# Patient Record
Sex: FEMALE | Race: WHITE | NOT HISPANIC OR LATINO | Employment: OTHER | URBAN - METROPOLITAN AREA
[De-identification: names, ages, dates, MRNs, and addresses within clinical notes are randomized per-mention and may not be internally consistent; named-entity substitution may affect disease eponyms.]

---

## 2017-02-02 RX ORDER — SPIRONOLACTONE 25 MG/1
TABLET ORAL
Qty: 30 TAB | Refills: 2 | Status: SHIPPED | OUTPATIENT
Start: 2017-02-02 | End: 2017-07-12 | Stop reason: SDUPTHER

## 2017-03-09 DIAGNOSIS — I27.82 OTHER CHRONIC PULMONARY EMBOLISM: ICD-10-CM

## 2017-03-09 RX ORDER — RIVAROXABAN 20 MG/1
TABLET, FILM COATED ORAL
Qty: 30 TAB | Refills: 5 | Status: SHIPPED | OUTPATIENT
Start: 2017-03-09 | End: 2017-08-30 | Stop reason: SDUPTHER

## 2017-03-09 NOTE — TELEPHONE ENCOUNTER
Have we ever prescribed this med? Yes.  If yes, what date? 06/03/16    Last OV: 09/27/16-Jeremy    Next OV: 05/16/17-HOWARD ROT    DX: Chronic Pulmonary Embolism     Medications:   Requested Prescriptions     Pending Prescriptions Disp Refills   • XARELTO 20 MG Tab tablet [Pharmacy Med Name: XARELTO 20MG TABLETS] 30 Tab 5     Sig: TAKE 1 TABLET BY MOUTH EVERY DAY

## 2017-03-15 ENCOUNTER — HOSPITAL ENCOUNTER (OUTPATIENT)
Dept: LAB | Facility: MEDICAL CENTER | Age: 72
End: 2017-03-15
Attending: FAMILY MEDICINE
Payer: MEDICARE

## 2017-03-15 ENCOUNTER — HOSPITAL ENCOUNTER (OUTPATIENT)
Dept: LAB | Facility: MEDICAL CENTER | Age: 72
End: 2017-03-15
Attending: INTERNAL MEDICINE
Payer: MEDICARE

## 2017-03-15 DIAGNOSIS — E03.4 HYPOTHYROIDISM DUE TO ACQUIRED ATROPHY OF THYROID: ICD-10-CM

## 2017-03-15 DIAGNOSIS — E53.8 VITAMIN B12 DEFICIENCY: ICD-10-CM

## 2017-03-15 DIAGNOSIS — R73.9 ELEVATED BLOOD SUGAR: ICD-10-CM

## 2017-03-15 DIAGNOSIS — E79.0 ELEVATED URIC ACID IN BLOOD: ICD-10-CM

## 2017-03-15 LAB
25(OH)D3 SERPL-MCNC: 30 NG/ML (ref 30–100)
ALBUMIN SERPL BCP-MCNC: 4.3 G/DL (ref 3.2–4.9)
ALBUMIN SERPL BCP-MCNC: 4.5 G/DL (ref 3.2–4.9)
ALBUMIN/GLOB SERPL: 1.7 G/DL
ALP SERPL-CCNC: 86 U/L (ref 30–99)
ALT SERPL-CCNC: 14 U/L (ref 2–50)
ANION GAP SERPL CALC-SCNC: 8 MMOL/L (ref 0–11.9)
AST SERPL-CCNC: 17 U/L (ref 12–45)
BILIRUB SERPL-MCNC: 0.6 MG/DL (ref 0.1–1.5)
BUN SERPL-MCNC: 24 MG/DL (ref 8–22)
BUN SERPL-MCNC: 25 MG/DL (ref 8–22)
CALCIUM SERPL-MCNC: 9.5 MG/DL (ref 8.5–10.5)
CALCIUM SERPL-MCNC: 9.5 MG/DL (ref 8.5–10.5)
CHLORIDE SERPL-SCNC: 103 MMOL/L (ref 96–112)
CHLORIDE SERPL-SCNC: 105 MMOL/L (ref 96–112)
CO2 SERPL-SCNC: 26 MMOL/L (ref 20–33)
CO2 SERPL-SCNC: 26 MMOL/L (ref 20–33)
CREAT SERPL-MCNC: 0.92 MG/DL (ref 0.5–1.4)
CREAT SERPL-MCNC: 0.96 MG/DL (ref 0.5–1.4)
CREAT UR-MCNC: 142.5 MG/DL
EST. AVERAGE GLUCOSE BLD GHB EST-MCNC: 123 MG/DL
GLOBULIN SER CALC-MCNC: 2.6 G/DL (ref 1.9–3.5)
GLUCOSE SERPL-MCNC: 105 MG/DL (ref 65–99)
GLUCOSE SERPL-MCNC: 105 MG/DL (ref 65–99)
HBA1C MFR BLD: 5.9 % (ref 0–5.6)
PHOSPHATE SERPL-MCNC: 2.9 MG/DL (ref 2.5–4.5)
POTASSIUM SERPL-SCNC: 4.1 MMOL/L (ref 3.6–5.5)
POTASSIUM SERPL-SCNC: 4.1 MMOL/L (ref 3.6–5.5)
PROT 24H UR-MRATE: 8.8 MG/DL (ref 0–15)
PROT SERPL-MCNC: 7.1 G/DL (ref 6–8.2)
PROT/CREAT UR: 62 MG/G (ref 10–107)
SODIUM SERPL-SCNC: 137 MMOL/L (ref 135–145)
SODIUM SERPL-SCNC: 139 MMOL/L (ref 135–145)
T4 FREE SERPL-MCNC: 0.95 NG/DL (ref 0.53–1.43)
TSH SERPL DL<=0.005 MIU/L-ACNC: 1.39 UIU/ML (ref 0.3–3.7)
URATE SERPL-MCNC: 7.1 MG/DL (ref 1.9–8.2)
VIT B12 SERPL-MCNC: 544 PG/ML (ref 211–911)

## 2017-03-15 PROCEDURE — 84156 ASSAY OF PROTEIN URINE: CPT

## 2017-03-15 PROCEDURE — 80069 RENAL FUNCTION PANEL: CPT

## 2017-03-15 PROCEDURE — 82570 ASSAY OF URINE CREATININE: CPT

## 2017-03-15 PROCEDURE — 82306 VITAMIN D 25 HYDROXY: CPT | Mod: GA

## 2017-03-17 ENCOUNTER — TELEPHONE (OUTPATIENT)
Dept: MEDICAL GROUP | Facility: MEDICAL CENTER | Age: 72
End: 2017-03-17

## 2017-03-17 NOTE — TELEPHONE ENCOUNTER
----- Message from Brian Strauss M.D. sent at 3/17/2017 11:52 AM PDT -----  Hello,    Your labs look good without any significant abnormality.  We can discuss at your next visit.  I hope you are doing well.    Brian Strauss M.D.

## 2017-04-10 ENCOUNTER — APPOINTMENT (OUTPATIENT)
Dept: MEDICAL GROUP | Facility: MEDICAL CENTER | Age: 72
End: 2017-04-10
Payer: MEDICARE

## 2017-05-12 ENCOUNTER — TELEPHONE (OUTPATIENT)
Dept: MEDICAL GROUP | Facility: MEDICAL CENTER | Age: 72
End: 2017-05-12

## 2017-05-12 NOTE — TELEPHONE ENCOUNTER
Future Appointments       Provider Department Center    5/16/2017 10:40 AM Brian Strauss M.D. [facility]       ESTABLISHED PATIENT PRE-VISIT PLANNING     Note: Patient will not be contacted if there is no indication to call.     1.  Reviewed note from last office visit with PCP and/or other med group provider: Yes    2.  If any orders were placed at last visit, do we have Results/Consult Notes?        •  Labs - Labs ordered, completed and results are in chart.       •  Imaging - Imaging was not ordered at last office visit.       •  Referrals - No referrals were ordered at last office visit.    3.  Immunizations were updated in [employer] using WebIZ?: Yes       •  Web Iz Recommendations: HEPATITIS A  HEPATITIS B PREVNAR (PCV13)  TD VARICELLA (Chicken Pox)  ZOSTAVAX (Shingles)    4.  Patient is due for the following Health Maintenance Topics:   Health Maintenance Due   Topic Date Due   • Annual Wellness Visit  1945   • MAMMOGRAM  06/11/1983   • COLONOSCOPY  06/11/1993   • IMM ZOSTER VACCINE  06/11/2003   • BONE DENSITY  06/11/2008     5.  Patient was informed to arrive 15 min prior to their scheduled appointment and bring in their medication bottles. Pt. Aware of our location.

## 2017-05-15 ENCOUNTER — OFFICE VISIT (OUTPATIENT)
Dept: PULMONOLOGY | Facility: HOSPICE | Age: 72
End: 2017-05-15
Payer: MEDICARE

## 2017-05-15 VITALS
TEMPERATURE: 98 F | HEART RATE: 78 BPM | BODY MASS INDEX: 28.89 KG/M2 | RESPIRATION RATE: 15 BRPM | DIASTOLIC BLOOD PRESSURE: 80 MMHG | SYSTOLIC BLOOD PRESSURE: 142 MMHG | HEIGHT: 62 IN | WEIGHT: 157 LBS

## 2017-05-15 DIAGNOSIS — I82.431 ACUTE DEEP VEIN THROMBOSIS (DVT) OF POPLITEAL VEIN OF RIGHT LOWER EXTREMITY (HCC): ICD-10-CM

## 2017-05-15 DIAGNOSIS — I26.02 ACUTE SADDLE PULMONARY EMBOLISM WITH ACUTE COR PULMONALE (HCC): ICD-10-CM

## 2017-05-15 PROCEDURE — 1036F TOBACCO NON-USER: CPT | Performed by: INTERNAL MEDICINE

## 2017-05-15 PROCEDURE — 4040F PNEUMOC VAC/ADMIN/RCVD: CPT | Performed by: INTERNAL MEDICINE

## 2017-05-15 PROCEDURE — G8432 DEP SCR NOT DOC, RNG: HCPCS | Performed by: INTERNAL MEDICINE

## 2017-05-15 PROCEDURE — 1101F PT FALLS ASSESS-DOCD LE1/YR: CPT | Performed by: INTERNAL MEDICINE

## 2017-05-15 PROCEDURE — G8419 CALC BMI OUT NRM PARAM NOF/U: HCPCS | Performed by: INTERNAL MEDICINE

## 2017-05-15 PROCEDURE — 3017F COLORECTAL CA SCREEN DOC REV: CPT | Mod: 8P | Performed by: INTERNAL MEDICINE

## 2017-05-15 PROCEDURE — 99214 OFFICE O/P EST MOD 30 MIN: CPT | Performed by: INTERNAL MEDICINE

## 2017-05-15 PROCEDURE — 3014F SCREEN MAMMO DOC REV: CPT | Mod: 8P | Performed by: INTERNAL MEDICINE

## 2017-05-15 RX ORDER — ASCORBIC ACID
CRYSTALS ORAL DAILY
COMMUNITY
End: 2018-05-21

## 2017-05-15 NOTE — PROGRESS NOTES
CC: Follow-up of thromboembolic disease    HPI:  71-year-old woman was last seen by [facility] in October 2016. 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 She is maintained on long-term anticoagulation with Xarelto and uses this medication on a regular basis.    She had acute and chronic DVT in the right popliteal and posterior tibial veins in June 2013. At that time she also had massive pulmonary embolism including saddle embolus with involvement of all lobes. In April 2014 she had a right lower lobe PE.    Denies any problems with bleeding, back pain, itching, or lab test abnormalities according to her knowledge.    Has 2 issues. Concerned about her oxygen saturations. Had sats dropping in to the 80s with exercise. Discussed at length.    Has some occasional dizziness which waxes and wanes.  Actually describes vertigo - will refer to Dr. Strauss. Has recently had sinus issues with extreme nose-blowing which bothers her inner ears.      Patient Active Problem List    Diagnosis Date Noted   • Deep vein thrombosis (DVT) of popliteal vein of right lower extremity (CMS-HCC) 05/15/2017   • Lesion of throat 03/04/2016   • Neuropathy (CMS-HCC) 03/04/2016   • Sleep disorder 11/16/2015   • Hypothyroidism 07/09/2015   • Shortness of breath on exertion 07/09/2015   • Elevated blood sugar 07/09/2015   • Gout 05/13/2015   • CKD (chronic kidney disease) stage 3, GFR 30-59 ml/min 05/13/2015   • HTN (hypertension) 04/06/2015   • Hyperlipidemia 04/06/2015   • Pulmonary embolism (CMS-HCC) 04/23/2014   • Pulmonary embolism (CMS-HCC) 06/14/2013   • Near syncope 06/14/2013       Past Medical History   Diagnosis Date   • Hypertension    • Arthritis    • Hypoglycemia    • Anxiety    • Hyperlipidemia    • GERD (gastroesophageal reflux disease)    • PE (pulmonary embolism)    • DVT (deep venous thrombosis) (CMS-HCC)    • Hypothyroid    • Allergic rhinitis    • Back pain    •  Hypothyroidism    • Pneumonia    • Gout    • Hiatal hernia         Past Surgical History   Procedure Laterality Date   • Basal cell excision     • Pr enlarge breast with implant     • Hysterectomy, total abdominal     • Tonsillectomy         History reviewed. No pertinent family history.    Social History     Social History   • Marital Status: Single     Spouse Name: N/A   • Number of Children: N/A   • Years of Education: N/A     Occupational History   • Not on file.     Social History Main Topics   • Smoking status: Never Smoker    • Smokeless tobacco: Never Used   • Alcohol Use: No   • Drug Use: No   • Sexual Activity: Not on file     Other Topics Concern   • Not on file     Social History Narrative       Current Outpatient Prescriptions   Medication Sig Dispense Refill   • Cyanocobalamin (VITAMIN B 12) 250 MCG Lozenge Take  by mouth.     • XARELTO 20 MG Tab tablet TAKE 1 TABLET BY MOUTH EVERY DAY 30 Tab 5   • metoprolol (LOPRESSOR) 25 MG Tab TAKE ONE-HALF TABLET BY MOUTH EVERY DAY 30 Tab 2   • spironolactone (ALDACTONE) 25 MG Tab TAKE ONE-HALF TABLET BY MOUTH EVERY OTHER DAY AS NEEDED 30 Tab 2   • ondansetron (ZOFRAN ODT) 4 MG TABLET DISPERSIBLE DISSOLVE ONE TABLET BY MOUTH EVERY 8 HOURS AS NEEDED FOR NAUSEA AND VOMITING 12 Tab 3   • therapeutic multivitamin-minerals (THERAGRAN-M) Tab Take 1 Tab by mouth every day.     • levothyroxine (SYNTHROID) 50 MCG Tab Take 1 Tab by mouth Every morning on an empty stomach. 90 Tab 3   • Cholecalciferol (VITAMIN D) 2000 UNITS Cap Take 1,000 Units by mouth every day at 6 PM.     • lidocaine (XYLOCAINE) 5 % Ointment Apply 1 Application to affected area(s) as needed. 1 Tube 3   • alprazolam (XANAX) 0.25 MG Tab Take 1 Tab by mouth at bedtime as needed. Indications: Trouble Sleeping 30 Tab 0   • MELATONIN PO Take  by mouth.     • acetaminophen (TYLENOL) 325 MG TABS Take 650 mg by mouth every four hours as needed. Indications: Pain     • famotidine (PEPCID) 20 MG TABS Take 10 mg by  "mouth 2 times a day as needed. For GERD       No current facility-administered medications for this visit.    \"CURRENT RX\"    ALLERGIES: Ace inhibitors; Clindamycin; Lisinopril; Mobic; Nsaids; Prednisone; and Pseudoephedrine hcl    ROS  Per HPI, otherwise negative.    PHYSICAL EXAM    /80 mmHg  Pulse 78  Temp(Src) 36.7 °C (98 °F) (Oral)  Resp 15  Ht 1.575 m (5' 2\")  Wt 71.215 kg (157 lb)  BMI 28.71 kg/m2  Appearance: Well-nourished, well-developed, no acute distress  Eyes:  PERRLA, EOMI; glasses  Hearing:  Grossly intact  Nose:  Normal, no lesions or deformities, turbinates moist  Oropharynx:  Tongue normal, posterior pharynx without erythema or exudate  Mallampati classification:  2  Neck: Supple, trachea midline, no masses  Respiratory effort:  No intercostal retractions or use of accessory muscles  Lung auscultation:  No wheezes rhonchi rubs or rales  Cardiac auscultation:  No murmurs, rubs, or gallops, no regular rhythm, normal rate  Abdomen:  No tenderness, no organomegaly  Extremities:  No cyanosis, clubbing, edema  Gait and Station:  Normal  Digits and nails: No clubbing, cyanosis, petechiae, or nodes  Musculoskeletal:  Grossly normal  Skin:  No rashes  Orientation:  Oriented time, place, and person  Mood and affect:  No depression, anxiety, agitation  Judgment:  Intact    PROBLEMS:  History of DVT  History of massive saddle pulmonary embolism  Ongoing anticoagulation therapy was Xarelto  Chronic kidney disease  Hypertension hypothyroidism  Vertigo       PLAN:   She will continue Xarelto as prescribed, 20 mg a day. She will continue her other medications and follow up regularly with her primary care physician Dr. Strauss.  Will return to clinic in 12 months or sooner should the need arise.                      "

## 2017-05-15 NOTE — MR AVS SNAPSHOT
"        Sandi Hesterford   5/15/2017 1:00 PM   Office Visit   MRN: [id number]    Department:  [facility]   Dept Phone:  [phone]    Description:  Female : 1945   Provider:  Felix Nails M.D.           Reason for Visit     Follow-Up Thromboembolic Disease      Allergies as of 5/15/2017     Allergen Noted Reactions    Ace Inhibitors 2011       Clindamycin 08/15/2011       Lisinopril 07/10/2013   Vomiting    Mobic 2012       Nsaids 2015       Prednisone 2011       \"flu like symptoms\"    Pseudoephedrine Hcl 2008         You were diagnosed with     Acute saddle pulmonary embolism with acute cor pulmonale (CMS-HCC)   [8452761]       Acute deep vein thrombosis (DVT) of popliteal vein of right lower extremity (CMS-[facility])   [0626961]         Vital Signs     Blood Pressure Pulse Temperature Respirations Height Weight    142/80 mmHg 78 36.7 °C (98 °F) (Oral) 15 1.575 m (5' 2\") 71.215 kg (157 lb)    Body Mass Index Smoking Status                28.71 kg/m2 Never Smoker           Basic Information     Date Of Birth Sex Race Ethnicity Preferred Language    1945 Female White Non- English      Your appointments     May 16, 2017 10:40 AM   Established Patient with Brian Strauss M.D.   [facility] (--)    [address]  [address]  [facility] [address]   [phone]           You will be receiving a confirmation call a few days before your appointment from our automated call confirmation system.              Problem List              ICD-10-CM Priority Class Noted - Resolved    Pulmonary embolism (CMS-HCC) I26.99   2013 - Present    Near syncope R55   2013 - Present    Pulmonary embolism (CMS-HCC) I26.99   2014 - Present    HTN (hypertension) I10   2015 - Present    Hyperlipidemia E78.5   2015 - Present    Gout M10.9   2015 - Present    CKD (chronic kidney disease) stage 3, GFR 30-59 ml/min N18.3   2015 - " Present    Hypothyroidism E03.9   7/9/2015 - Present    Shortness of breath on exertion R06.02   7/9/2015 - Present    Elevated blood sugar R73.9   7/9/2015 - Present    Sleep disorder G47.9   11/16/2015 - Present    Lesion of throat J39.2   3/4/2016 - Present    Neuropathy (CMS-HCC) G62.9   3/4/2016 - Present    Deep vein thrombosis (DVT) of popliteal vein of right lower extremity (CMS-HCC) I82.431   5/15/2017 - Present      Health Maintenance        Date Due Completion Dates    MAMMOGRAM 6/11/1983 ---    COLONOSCOPY 6/11/1993 ---    IMM ZOSTER VACCINE 6/11/2003 ---    BONE DENSITY 6/11/2008 ---    IMM DTaP/Tdap/Td Vaccine (2 - Td) 5/14/2022 5/14/2012            Current Immunizations     13-VALENT PCV PREVNAR 11/12/2012    Influenza TIV (IM) 11/12/2012    Influenza Vaccine Adult HD 9/27/2016    Pneumococcal polysaccharide vaccine (PPSV-23) 10/6/2011    Tdap Vaccine 5/14/2012      Below and/or attached are the medications your provider expects you to take. Review all of your home medications and newly ordered medications with your provider and/or pharmacist. Follow medication instructions as directed by your provider and/or pharmacist. Please keep your medication list with you and share with your provider. Update the information when medications are discontinued, doses are changed, or new medications (including over-the-counter products) are added; and carry medication information at all times in the event of emergency situations     Allergies:  ACE INHIBITORS - (reactions not documented)     CLINDAMYCIN - (reactions not documented)     LISINOPRIL - Vomiting     MOBIC - (reactions not documented)     NSAIDS - (reactions not documented)     PREDNISONE - (reactions not documented)     PSEUDOEPHEDRINE HCL - (reactions not documented)               Medications  Valid as of: May 15, 2017 -  1:19 PM    Generic Name Brand Name Tablet Size Instructions for use    Acetaminophen (Tab) TYLENOL 325 MG Take 650 mg by mouth every  four hours as needed. Indications: Pain        ALPRAZolam (Tab) XANAX 0.25 MG Take 1 Tab by mouth at bedtime as needed. Indications: Trouble Sleeping        Cholecalciferol (Cap) Vitamin D 2000 UNITS Take 1,000 Units by mouth every day at 6 PM.        Cyanocobalamin (Lozenge) Vitamin B 12 250 MCG Take  by mouth.        Famotidine (Tab) PEPCID 20 MG Take 10 mg by mouth 2 times a day as needed. For GERD        Levothyroxine Sodium (Tab) SYNTHROID 50 MCG Take 1 Tab by mouth Every morning on an empty stomach.        Lidocaine (Ointment) XYLOCAINE 5 % Apply 1 Application to affected area(s) as needed.        Melatonin   Take  by mouth.        Metoprolol Tartrate (Tab) LOPRESSOR 25 MG TAKE ONE-HALF TABLET BY MOUTH EVERY DAY        Multiple Vitamins-Minerals (Tab) THERAGRAN-M  Take 1 Tab by mouth every day.        Ondansetron (TABLET DISPERSIBLE) ZOFRAN ODT 4 MG DISSOLVE ONE TABLET BY MOUTH EVERY 8 HOURS AS NEEDED FOR NAUSEA AND VOMITING        Rivaroxaban (Tab) XARELTO 20 MG TAKE 1 TABLET BY MOUTH EVERY DAY        Spironolactone (Tab) ALDACTONE 25 MG TAKE ONE-HALF TABLET BY MOUTH EVERY OTHER DAY AS NEEDED        .                 Medicines prescribed today were sent to:     [facility] [address] [place] [address] [address] AT [facility] [address] [facility]    [address] [facility] [address]    Phone: [phone] Fax: [phone]    Open 24 Hours?: No      Medication refill instructions:       If your prescription bottle indicates you have medication refills left, it is not necessary to call your provider’s office. Please contact your pharmacy and they will refill your medication.    If your prescription bottle indicates you do not have any refills left, you may request refills at any time through one of the following ways: The [email or web address] system (except Urgent Care), by calling your provider’s office, or by asking your pharmacy to contact your provider’s office with a refill request. Medication  refills are processed only during regular business hours and may not be available until the next business day. Your provider may request additional information or to have a follow-up visit with you prior to refilling your medication.   *Please Note: Medication refills are assigned a new Rx number when refilled electronically. Your pharmacy may indicate that no refills were authorized even though a new prescription for the same medication is available at the pharmacy. Please request the medicine by name with the pharmacy before contacting your provider for a refill.           Opsware Access Code: Activation code not generated  Current Opsware Status: Active

## 2017-05-16 ENCOUNTER — OFFICE VISIT (OUTPATIENT)
Dept: MEDICAL GROUP | Facility: MEDICAL CENTER | Age: 72
End: 2017-05-16
Payer: MEDICARE

## 2017-05-16 VITALS
HEIGHT: 62 IN | WEIGHT: 157 LBS | DIASTOLIC BLOOD PRESSURE: 92 MMHG | BODY MASS INDEX: 28.89 KG/M2 | SYSTOLIC BLOOD PRESSURE: 162 MMHG | RESPIRATION RATE: 18 BRPM | TEMPERATURE: 99.2 F | OXYGEN SATURATION: 94 % | HEART RATE: 82 BPM

## 2017-05-16 DIAGNOSIS — Z86.711 HISTORY OF PULMONARY EMBOLISM: ICD-10-CM

## 2017-05-16 DIAGNOSIS — E03.9 ACQUIRED HYPOTHYROIDISM: ICD-10-CM

## 2017-05-16 DIAGNOSIS — G62.9 NEUROPATHY: ICD-10-CM

## 2017-05-16 DIAGNOSIS — N18.30 CKD (CHRONIC KIDNEY DISEASE) STAGE 3, GFR 30-59 ML/MIN (HCC): ICD-10-CM

## 2017-05-16 PROCEDURE — G8432 DEP SCR NOT DOC, RNG: HCPCS | Performed by: FAMILY MEDICINE

## 2017-05-16 PROCEDURE — 1036F TOBACCO NON-USER: CPT | Performed by: FAMILY MEDICINE

## 2017-05-16 PROCEDURE — 3014F SCREEN MAMMO DOC REV: CPT | Mod: 8P | Performed by: FAMILY MEDICINE

## 2017-05-16 PROCEDURE — G8419 CALC BMI OUT NRM PARAM NOF/U: HCPCS | Performed by: FAMILY MEDICINE

## 2017-05-16 PROCEDURE — 1101F PT FALLS ASSESS-DOCD LE1/YR: CPT | Performed by: FAMILY MEDICINE

## 2017-05-16 PROCEDURE — 3017F COLORECTAL CA SCREEN DOC REV: CPT | Mod: 8P | Performed by: FAMILY MEDICINE

## 2017-05-16 PROCEDURE — 99214 OFFICE O/P EST MOD 30 MIN: CPT | Performed by: FAMILY MEDICINE

## 2017-05-16 PROCEDURE — 4040F PNEUMOC VAC/ADMIN/RCVD: CPT | Performed by: FAMILY MEDICINE

## 2017-05-16 RX ORDER — CLONAZEPAM 0.5 MG/1
0.25 TABLET ORAL
Qty: 30 TAB | Refills: 1 | Status: SHIPPED | OUTPATIENT
Start: 2017-05-16 | End: 2017-07-12 | Stop reason: SDUPTHER

## 2017-05-16 NOTE — MR AVS SNAPSHOT
"        Sandi Hesterford   2017 10:40 AM   Office Visit   MRN: [id number]    Department:  [facility]   Dept Phone:  [phone]    Description:  Female : 1945   Provider:  Brian Strauss M.D.           Reason for Visit     Dizziness n6edmls, dizzy when laying down flat at night and to sit up in morning    Other would like to discuss neuropathy      Allergies as of 2017     Allergen Noted Reactions    Ace Inhibitors 2011       Clindamycin 08/15/2011       Lisinopril 07/10/2013   Vomiting    Mobic 2012       Nsaids 2015       Prednisone 2011       \"flu like symptoms\"    Pseudoephedrine Hcl 2008         You were diagnosed with     Neuropathy (CMS-[facility])   [473334]       Acquired hypothyroidism   [5181353]         Vital Signs     Blood Pressure Pulse Temperature Respirations Height Weight    162/92 mmHg 82 37.3 °C (99.2 °F) 18 1.575 m (5' 2.01\") 71.215 kg (157 lb)    Body Mass Index Oxygen Saturation Smoking Status             28.71 kg/m2 94% Never Smoker          Basic Information     Date Of Birth Sex Race Ethnicity Preferred Language    1945 Female White Non- English      Your appointments     Oct 02, 2017  2:00 PM   Established Patient with Brian Strauss M.D.   [facility] (--)    [address]  [address]  [facility] [address]   [phone]           You will be receiving a confirmation call a few days before your appointment from our automated call confirmation system.              Problem List              ICD-10-CM Priority Class Noted - Resolved    Pulmonary embolism (CMS-HCC) I26.99   2013 - Present    Near syncope R55   2013 - Present    Pulmonary embolism (CMS-HCC) I26.99   2014 - Present    HTN (hypertension) I10   2015 - Present    Hyperlipidemia E78.5   2015 - Present    Gout M10.9   2015 - Present    CKD (chronic kidney disease) stage 3, GFR 30-59 ml/min N18.3   2015 - " Present    Hypothyroidism E03.9   7/9/2015 - Present    Shortness of breath on exertion R06.02   7/9/2015 - Present    Elevated blood sugar R73.9   7/9/2015 - Present    Sleep disorder G47.9   11/16/2015 - Present    Lesion of throat J39.2   3/4/2016 - Present    Neuropathy (CMS-HCC) G62.9   3/4/2016 - Present    Deep vein thrombosis (DVT) of popliteal vein of right lower extremity (CMS-HCC) I82.431   5/15/2017 - Present      Health Maintenance        Date Due Completion Dates    IMM ZOSTER VACCINE 6/11/2003 ---    BONE DENSITY 6/11/2008 ---    IMM DTaP/Tdap/Td Vaccine (2 - Td) 5/14/2022 5/14/2012            Current Immunizations     13-VALENT PCV PREVNAR 11/12/2012    Influenza TIV (IM) 11/12/2012    Influenza Vaccine Adult HD 9/27/2016    Pneumococcal polysaccharide vaccine (PPSV-23) 10/6/2011    Tdap Vaccine 5/14/2012      Below and/or attached are the medications your provider expects you to take. Review all of your home medications and newly ordered medications with your provider and/or pharmacist. Follow medication instructions as directed by your provider and/or pharmacist. Please keep your medication list with you and share with your provider. Update the information when medications are discontinued, doses are changed, or new medications (including over-the-counter products) are added; and carry medication information at all times in the event of emergency situations     Allergies:  ACE INHIBITORS - (reactions not documented)     CLINDAMYCIN - (reactions not documented)     LISINOPRIL - Vomiting     MOBIC - (reactions not documented)     NSAIDS - (reactions not documented)     PREDNISONE - (reactions not documented)     PSEUDOEPHEDRINE HCL - (reactions not documented)               Medications  Valid as of: May 16, 2017 - 11:11 AM    Generic Name Brand Name Tablet Size Instructions for use    Acetaminophen (Tab) TYLENOL 325 MG Take 650 mg by mouth every four hours as needed. Indications: Pain         Cholecalciferol (Cap) Vitamin D 2000 UNITS Take 1,000 Units by mouth every day at 6 PM.        ClonazePAM (Tab) KLONOPIN 0.5 MG Take 0.5 Tabs by mouth 1 time daily as needed.        Cyanocobalamin (Lozenge) Vitamin B 12 250 MCG Take  by mouth every day.        Famotidine (Tab) PEPCID 20 MG Take 10 mg by mouth 2 times a day as needed. For GERD        Levothyroxine Sodium (Tab) SYNTHROID 50 MCG Take 1 Tab by mouth Every morning on an empty stomach.        Lidocaine (Ointment) XYLOCAINE 5 % Apply 1 Application to affected area(s) as needed.        Melatonin   Take  by mouth.        Metoprolol Tartrate (Tab) LOPRESSOR 25 MG TAKE ONE-HALF TABLET BY MOUTH EVERY DAY        Multiple Vitamins-Minerals (Tab) THERAGRAN-M  Take 1 Tab by mouth every day.        Ondansetron (TABLET DISPERSIBLE) ZOFRAN ODT 4 MG DISSOLVE ONE TABLET BY MOUTH EVERY 8 HOURS AS NEEDED FOR NAUSEA AND VOMITING        Rivaroxaban (Tab) XARELTO 20 MG TAKE 1 TABLET BY MOUTH EVERY DAY        Spironolactone (Tab) ALDACTONE 25 MG TAKE ONE-HALF TABLET BY MOUTH EVERY OTHER DAY AS NEEDED        .                 Medicines prescribed today were sent to:     [facility] [address] [place] [address] [address] AT [facility] [address]    [address] [place] [address]    Phone: [phone] Fax: [phone]    Open 24 Hours?: No      Medication refill instructions:       If your prescription bottle indicates you have medication refills left, it is not necessary to call your provider’s office. Please contact your pharmacy and they will refill your medication.    If your prescription bottle indicates you do not have any refills left, you may request refills at any time through one of the following ways: The online [employer] system (except Urgent Care), by calling your provider’s office, or by asking your pharmacy to contact your provider’s office with a refill request. Medication refills are processed only during regular business hours and may  not be available until the next business day. Your provider may request additional information or to have a follow-up visit with you prior to refilling your medication.   *Please Note: Medication refills are assigned a new Rx number when refilled electronically. Your pharmacy may indicate that no refills were authorized even though a new prescription for the same medication is available at the pharmacy. Please request the medicine by name with the pharmacy before contacting your provider for a refill.        Your To Do List     Future Labs/Procedures Complete By Expires    CBC WITH DIFFERENTIAL  As directed 5/17/2018    COMP METABOLIC PANEL  As directed 5/17/2018    FREE THYROXINE  As directed 5/17/2018    TSH  As directed 5/17/2018    VITAMIN B12  As directed 5/17/2018         MyChart Access Code: Activation code not generated  Current Speek Status: Active

## 2017-05-19 PROBLEM — I82.431 DEEP VEIN THROMBOSIS (DVT) OF POPLITEAL VEIN OF RIGHT LOWER EXTREMITY (HCC): Status: RESOLVED | Noted: 2017-05-15 | Resolved: 2017-05-19

## 2017-05-19 NOTE — PROGRESS NOTES
Chief Complaint   Patient presents with   • Dizziness     [id number], dizzy when laying down flat at night and to sit up in morning   • Other     would like to discuss neuropathy     Multiple medical problems    HISTORY OF PRESENT ILLNESS: Patient is a 71 y.o. female established patient who presents today for the following.      1. Neuropathy (CMS-HCC)  This is a chronic and ongoing problem for the patient. Discussed the risks benefits and alternatives to these medications. Patient does not drive on these medications.  Discussed not drinking while taking this medication. Discussed not combining this medication with other sedating medications. Patient has multiple life stressors. Denies any depression or suicidal ideation.      2. Acquired hypothyroidism  Reviewed labs with the patient.  Taking medication as prescribed.  Patient denies any heat or cold intolerance. No abnormal diarrhea or constipation. No skin or hair changes.        3. History of pulmonary embolism  Doing well. Taking medications as prescribed. No chest pain palpitations or shortness of breath.  No bleeding events.        4. CKD (chronic kidney disease) stage 3, GFR 30-59 ml/min  Reviewed labs with the patient.  Discussed with the patient their chronic kidney disease.  Discussed need to avoid nephrotoxic drugs to include NSAIDs, certain antibiotics and contrast dye. Patient understands. Patient denies any urinary problems. No blood in the urine noted.          No problem-specific assessment & plan notes found for this encounter.      She  has a past medical history of Hypertension; Arthritis; Hypoglycemia; Anxiety; Hyperlipidemia; GERD (gastroesophageal reflux disease); PE (pulmonary embolism); DVT (deep venous thrombosis) (CMS-HCC); Hypothyroid; Allergic rhinitis; Back pain; Hypothyroidism; Pneumonia; Gout; and Hiatal hernia.    Patient Active Problem List    Diagnosis Date Noted   • Lesion of throat 03/04/2016   • Neuropathy (CMS-HCC) 03/04/2016   •  Sleep disorder 11/16/2015   • Hypothyroidism 07/09/2015   • Shortness of breath on exertion 07/09/2015   • Elevated blood sugar 07/09/2015   • Gout 05/13/2015   • CKD (chronic kidney disease) stage 3, GFR 30-59 ml/min 05/13/2015   • HTN (hypertension) 04/06/2015   • Hyperlipidemia 04/06/2015   • History of pulmonary embolism 04/23/2014       Allergies:Ace inhibitors; Clindamycin; Lisinopril; Mobic; Nsaids; Prednisone; and Pseudoephedrine hcl    Current Outpatient Prescriptions   Medication Sig Dispense Refill   • clonazepam (KLONOPIN) 0.5 MG Tab Take 0.5 Tabs by mouth 1 time daily as needed. 30 Tab 1   • Cyanocobalamin (VITAMIN B 12) 250 MCG Lozenge Take  by mouth every day.     • XARELTO 20 MG Tab tablet TAKE 1 TABLET BY MOUTH EVERY DAY 30 Tab 5   • metoprolol (LOPRESSOR) 25 MG Tab TAKE ONE-HALF TABLET BY MOUTH EVERY DAY 30 Tab 2   • spironolactone (ALDACTONE) 25 MG Tab TAKE ONE-HALF TABLET BY MOUTH EVERY OTHER DAY AS NEEDED 30 Tab 2   • therapeutic multivitamin-minerals (THERAGRAN-M) Tab Take 1 Tab by mouth every day.     • levothyroxine (SYNTHROID) 50 MCG Tab Take 1 Tab by mouth Every morning on an empty stomach. 90 Tab 3   • Cholecalciferol (VITAMIN D) 2000 UNITS Cap Take 1,000 Units by mouth every day at 6 PM.     • lidocaine (XYLOCAINE) 5 % Ointment Apply 1 Application to affected area(s) as needed. 1 Tube 3   • acetaminophen (TYLENOL) 325 MG TABS Take 650 mg by mouth every four hours as needed. Indications: Pain     • ondansetron (ZOFRAN ODT) 4 MG TABLET DISPERSIBLE DISSOLVE ONE TABLET BY MOUTH EVERY 8 HOURS AS NEEDED FOR NAUSEA AND VOMITING 12 Tab 3   • MELATONIN PO Take  by mouth.     • famotidine (PEPCID) 20 MG TABS Take 10 mg by mouth 2 times a day as needed. For GERD       No current facility-administered medications for this visit.       Social History   Substance Use Topics   • Smoking status: Never Smoker    • Smokeless tobacco: Never Used   • Alcohol Use: No       No family history on file.    Health  "Maintenance:      Review of Systems   No fever, chills, nausea, vomiting, diarrhea, chest pain or shortness of breath.  See HPI    Exam:  Blood pressure 162/92, pulse 82, temperature 37.3 °C (99.2 °F), resp. rate 18, height 1.575 m (5' 2.01\"), weight 71.215 kg (157 lb), SpO2 94 %. Body mass index is 28.71 kg/(m^2).  Constitutional:  NAD, well appearing.  HEENT:   NC/AT, PERRLA, EOMI, OP clear, no lymphadenopathy, no thyromegaly.    Cardiovascular: RRR.   No m/r/g. No carotid bruits.       Lungs:   CTAB, no w/r/r, no respiratory distress.  Abdomen: Soft, NT/ND + BS, no masses, no suprapubic tenderness, no hepatomegaly.  Extremities:  2+ DP and radial pulses bilaterally.  No c/c/e.  Skin:  Warm and dry.    Neurologic: Alert & oriented x 3, CN II-XII grossly intact, strength and sensation grossly intact.  No focal deficits noted.  Psychiatric:  Affect normal, mood normal, judgment normal.    Assessment/Plan:     1. Neuropathy (CMS-HCC)  This is a chronic and stable problem. Labs as indicated. Continue to monitor. Medications as applicable.    - CBC WITH DIFFERENTIAL; Future  - COMP METABOLIC PANEL; Future  - VITAMIN B12; Future    2. Acquired hypothyroidism  Stable. No adverse symptoms. Continue levothyroxine. Labs as indicated. Continue to monitor    - TSH; Future  - FREE THYROXINE; Future    3. History of pulmonary embolism  Stable. Taking medications as prescribed. No chest pain palpitations or shortness of breath.  No bleeding events.  Continue medications and anticoagulation.      4. CKD (chronic kidney disease) stage 3, GFR 30-59 ml/min  This is a chronic and stable problem. Labs reviewed with the patient. Discussed avoidance of nephrotoxic drugs. Continue hypertensive management. Continue to monitor.          Followup: No Follow-up on file.    Please note that this dictation was created using voice recognition software. I have made every reasonable attempt to correct obvious errors, but I expect that there are " errors of grammar and possibly content that I did not discover before finalizing the note.

## 2017-06-22 ENCOUNTER — OFFICE VISIT (OUTPATIENT)
Dept: MEDICAL GROUP | Facility: PHYSICIAN GROUP | Age: 72
End: 2017-06-22
Payer: MEDICARE

## 2017-06-22 ENCOUNTER — HOSPITAL ENCOUNTER (OUTPATIENT)
Dept: LAB | Facility: MEDICAL CENTER | Age: 72
End: 2017-06-22
Attending: PHYSICIAN ASSISTANT
Payer: MEDICARE

## 2017-06-22 VITALS
DIASTOLIC BLOOD PRESSURE: 90 MMHG | HEART RATE: 91 BPM | HEIGHT: 62 IN | TEMPERATURE: 98.4 F | SYSTOLIC BLOOD PRESSURE: 150 MMHG | WEIGHT: 158.51 LBS | OXYGEN SATURATION: 97 % | BODY MASS INDEX: 29.17 KG/M2

## 2017-06-22 DIAGNOSIS — R10.32 LEFT LOWER QUADRANT PAIN: ICD-10-CM

## 2017-06-22 PROBLEM — R10.9 STOMACH PAIN: Status: RESOLVED | Noted: 2017-06-22 | Resolved: 2017-06-22

## 2017-06-22 PROBLEM — R10.9 STOMACH PAIN: Status: ACTIVE | Noted: 2017-06-22

## 2017-06-22 LAB
ALBUMIN SERPL BCP-MCNC: 4.3 G/DL (ref 3.2–4.9)
ALBUMIN/GLOB SERPL: 1.6 G/DL
ALP SERPL-CCNC: 81 U/L (ref 30–99)
ALT SERPL-CCNC: 15 U/L (ref 2–50)
ANION GAP SERPL CALC-SCNC: 10 MMOL/L (ref 0–11.9)
AST SERPL-CCNC: 17 U/L (ref 12–45)
BILIRUB SERPL-MCNC: 0.3 MG/DL (ref 0.1–1.5)
BUN SERPL-MCNC: 26 MG/DL (ref 8–22)
CALCIUM SERPL-MCNC: 9.2 MG/DL (ref 8.5–10.5)
CHLORIDE SERPL-SCNC: 105 MMOL/L (ref 96–112)
CO2 SERPL-SCNC: 24 MMOL/L (ref 20–33)
CREAT SERPL-MCNC: 0.87 MG/DL (ref 0.5–1.4)
ERYTHROCYTE [DISTWIDTH] IN BLOOD BY AUTOMATED COUNT: 46.2 FL (ref 35.9–50)
GFR SERPL CREATININE-BSD FRML MDRD: >60 ML/MIN/1.73 M 2
GLOBULIN SER CALC-MCNC: 2.7 G/DL (ref 1.9–3.5)
GLUCOSE SERPL-MCNC: 105 MG/DL (ref 65–99)
HCT VFR BLD AUTO: 45.4 % (ref 37–47)
HGB BLD-MCNC: 14.7 G/DL (ref 12–16)
MCH RBC QN AUTO: 29.2 PG (ref 27–33)
MCHC RBC AUTO-ENTMCNC: 32.4 G/DL (ref 33.6–35)
MCV RBC AUTO: 90.3 FL (ref 81.4–97.8)
PLATELET # BLD AUTO: 223 K/UL (ref 164–446)
PMV BLD AUTO: 11.2 FL (ref 9–12.9)
POTASSIUM SERPL-SCNC: 4 MMOL/L (ref 3.6–5.5)
PROT SERPL-MCNC: 7 G/DL (ref 6–8.2)
RBC # BLD AUTO: 5.03 M/UL (ref 4.2–5.4)
SODIUM SERPL-SCNC: 139 MMOL/L (ref 135–145)
WBC # BLD AUTO: 10.2 K/UL (ref 4.8–10.8)

## 2017-06-22 PROCEDURE — 99214 OFFICE O/P EST MOD 30 MIN: CPT | Performed by: PHYSICIAN ASSISTANT

## 2017-06-22 PROCEDURE — 85027 COMPLETE CBC AUTOMATED: CPT

## 2017-06-22 PROCEDURE — 80053 COMPREHEN METABOLIC PANEL: CPT

## 2017-06-22 PROCEDURE — 36415 COLL VENOUS BLD VENIPUNCTURE: CPT

## 2017-06-22 NOTE — MR AVS SNAPSHOT
"        Sandi Hesterford   2017 3:00 PM   Office Visit   MRN: [id number]    Department:  [facility]   Dept Phone:  [phone]    Description:  Female : 1945   Provider:  Xochitl Estrella PA-C           Reason for Visit     Other stomach concerns (uncomfort and soreness)      Allergies as of 2017     Allergen Noted Reactions    Ace Inhibitors 2011       Clindamycin 08/15/2011       Lisinopril 07/10/2013   Vomiting    Mobic 2012       Nsaids 2015       Prednisone 2011       \"flu like symptoms\"    Pseudoephedrine Hcl 2008         You were diagnosed with     Left lower quadrant pain   [[id number]]         Vital Signs     Blood Pressure Pulse Temperature Height Weight Body Mass Index    150/90 mmHg 91 36.9 °C (98.4 °F) 1.575 m (5' 2.01\") 71.9 kg (158 lb 8.2 oz) 28.98 kg/m2    Oxygen Saturation Smoking Status                97% Never Smoker           Basic Information     Date Of Birth Sex Race Ethnicity Preferred Language    1945 Female White Non- English      Your appointments     2017  8:30 AM   CT BODY WITH with VISTA CT 1   [facility] ([place])    [address] [facility] [address] [phone]           Some exams require specific prep instructions that would have been given to you at time of scheduling. If you have any additional questions about the prep instructions, please call Imaging Scheduling at [phone] and press #2.            Aug 11, 2017 10:00 AM   Established Patient with Brian Strauss M.D.   [facility] (--)    [address]  [address]  [facility] [place] [facility] [id number] [phone]           You will be receiving a confirmation call a few days before your appointment from our automated call confirmation system.            Oct 02, 2017  2:00 PM   Established Patient with Brian Strauss M.D.   [facility] (--)    [address]  [address]  [place] [place] [facility] [id number] [phone]           You " will be receiving a confirmation call a few days before your appointment from our automated call confirmation system.              Problem List              ICD-10-CM Priority Class Noted - Resolved    History of pulmonary embolism Z86.711   4/23/2014 - Present    HTN (hypertension) I10   4/6/2015 - Present    Hyperlipidemia E78.5   4/6/2015 - Present    Gout M10.9   5/13/2015 - Present    CKD (chronic kidney disease) stage 3, GFR 30-59 ml/min N18.3   5/13/2015 - Present    Hypothyroidism E03.9   7/9/2015 - Present    Shortness of breath on exertion R06.02   7/9/2015 - Present    Elevated blood sugar R73.9   7/9/2015 - Present    Sleep disorder G47.9   11/16/2015 - Present    Lesion of throat J39.2   3/4/2016 - Present    Neuropathy (CMS-HCC) G62.9   3/4/2016 - Present    Left lower quadrant pain R10.32   6/22/2017 - Present      Health Maintenance        Date Due Completion Dates    IMM ZOSTER VACCINE 6/11/2003 ---    BONE DENSITY 6/11/2008 ---    IMM DTaP/Tdap/Td Vaccine (2 - Td) 5/14/2022 5/14/2012            Current Immunizations     13-VALENT PCV PREVNAR 11/12/2012    Influenza TIV (IM) 11/12/2012    Influenza Vaccine Adult HD 9/27/2016    Pneumococcal polysaccharide vaccine (PPSV-23) 10/6/2011    Tdap Vaccine 5/14/2012      Below and/or attached are the medications your provider expects you to take. Review all of your home medications and newly ordered medications with your provider and/or pharmacist. Follow medication instructions as directed by your provider and/or pharmacist. Please keep your medication list with you and share with your provider. Update the information when medications are discontinued, doses are changed, or new medications (including over-the-counter products) are added; and carry medication information at all times in the event of emergency situations     Allergies:  ACE INHIBITORS - (reactions not documented)     CLINDAMYCIN - (reactions not documented)     LISINOPRIL - Vomiting      MOBIC - (reactions not documented)     NSAIDS - (reactions not documented)     PREDNISONE - (reactions not documented)     PSEUDOEPHEDRINE HCL - (reactions not documented)               Medications  Valid as of: June 22, 2017 -  3:54 PM    Generic Name Brand Name Tablet Size Instructions for use    Acetaminophen (Tab) TYLENOL 325 MG Take 650 mg by mouth every four hours as needed. Indications: Pain        Cholecalciferol (Cap) Vitamin D 2000 UNITS Take 1,000 Units by mouth every day at 6 PM.        ClonazePAM (Tab) KLONOPIN 0.5 MG Take 0.5 Tabs by mouth 1 time daily as needed.        Cyanocobalamin (Lozenge) Vitamin B 12 250 MCG Take  by mouth every day.        Famotidine (Tab) PEPCID 20 MG Take 10 mg by mouth 2 times a day as needed. For GERD        Levothyroxine Sodium (Tab) SYNTHROID 50 MCG Take 1 Tab by mouth Every morning on an empty stomach.        Lidocaine (Ointment) XYLOCAINE 5 % Apply 1 Application to affected area(s) as needed.        Melatonin   Take  by mouth.        Metoprolol Tartrate (Tab) LOPRESSOR 25 MG TAKE ONE-HALF TABLET BY MOUTH EVERY DAY        Multiple Vitamins-Minerals (Tab) THERAGRAN-M  Take 1 Tab by mouth every day.        Ondansetron (TABLET DISPERSIBLE) ZOFRAN ODT 4 MG DISSOLVE ONE TABLET BY MOUTH EVERY 8 HOURS AS NEEDED FOR NAUSEA AND VOMITING        Rivaroxaban (Tab) XARELTO 20 MG TAKE 1 TABLET BY MOUTH EVERY DAY        Spironolactone (Tab) ALDACTONE 25 MG TAKE ONE-HALF TABLET BY MOUTH EVERY OTHER DAY AS NEEDED        .                 Medicines prescribed today were sent to:     [id number] - [place] - [address] AT [facility] [address]    [address] [place] [address]    Phone: [phone] Fax: [phone]    Open 24 Hours?: No      Medication refill instructions:       If your prescription bottle indicates you have medication refills left, it is not necessary to call your provider’s office. Please contact your pharmacy and they will  refill your medication.    If your prescription bottle indicates you do not have any refills left, you may request refills at any time through one of the following ways: The online [facility] system (except Urgent Care), by calling your provider’s office, or by asking your pharmacy to contact your provider’s office with a refill request. Medication refills are processed only during regular business hours and may not be available until the next business day. Your provider may request additional information or to have a follow-up visit with you prior to refilling your medication.   *Please Note: Medication refills are assigned a new Rx number when refilled electronically. Your pharmacy may indicate that no refills were authorized even though a new prescription for the same medication is available at the pharmacy. Please request the medicine by name with the pharmacy before contacting your provider for a refill.        Your To Do List     Future Labs/Procedures Complete By Expires    CBC WITHOUT DIFFERENTIAL  As directed 12/23/2017    COMP METABOLIC PANEL  As directed 6/22/2018    CT-ABDOMEN-PELVIS WITH & W/O  As directed 12/23/2017         [facility] Access Code: Activation code not generated  Current Safe [facility] Clear Status: Active

## 2017-06-22 NOTE — ASSESSMENT & PLAN NOTE
"Patient states 1 week ago she started cramping on the left side of her abdomen. States the cramping was from midline of lower abdominal quadrant to left lower outer quadrant. States she felt  \"bubble bouncing when walking\" located left of bladder region. States the next day she experienced abdominal burning and it is an intermittent burning pain that keeps reoccurring. States the \"bubble/lump sensation is intermittent, reoccurring sensation. States she can not feel the \"bubble/lump\" with hands. States it reminds her of pregnancy flutters. States it sore to lay on left side. States she took Ex-Lax 5 days before developing the above symptoms. States she took the Ex-Lax because she was feeling a kink on left side.  States she is unable to recall how many bowel movements   States 3 days after developing the above symptoms she has taken a cup full of Raina Lax in the am every day until today. Raina-Lax from Saturday June 17, 2017 - June 20, 2017. States she was going for 2-3 hours and stool is soft stools. States it is not diarrhea.    States she usually takes Raina Lax every 3 weeks for 3 days but states it makes her regular. States she was only going once a day.   Urology appointment in two weeks. States she has urethral caruncle   States the side ache is causing her to not being able to sleep on her side.   Denies melana, hematochezia, nausea, vomiting, fever, chills, headache, denies mucus in stools. Denies ever having a colonoscopy.   "

## 2017-06-22 NOTE — ASSESSMENT & PLAN NOTE
"Patient states 1 week ago she started experiencing cramping on the left side of her abdomen. States the cramping was from midline of left lower abdominal quadrant to left lower outer quadrant. States at the same she felt a \"bubble bouncing when walking\" located left of bladder region. States it feels like something is hanging out and bouncing. States the next day she experienced abdominal burning and it is an intermittent burning pain that keeps reoccurring. States the \"bubble/lump sensation is intermittent, reoccurring sensation. States she can not feel the \"bubble/lump\" with hands. States it reminds her of pregnancy flutters. States it sore to lay on left side. States she took Ex-Lax 5 days before developing the above symptoms. States she took the Ex-Lax because she was feeling a kink on left side.  States she is unable to recall how many bowel movements.States 3 days after developing the above symptoms she has taken a cup full of Raina Lax in the am every day until today. Raina-Lax from Saturday June 17, 2017 - June 20, 2017. States she was going for 2-3 hours and stool is soft stools. States it is not diarrhea.    States she usually takes Raina Lax every 3 weeks for 3 days but states it makes her regular. States she was only going once a day.   States she has an urology appointment in two weeks. States she believes she has urethral caruncle that she recently discovered on her own. States she has a history of blood clots that were discovered on June 2013 and was told it may have been due to hormonal replacement treatment. States she had a hysterectomy early 90's.  Denies melena, hematochezia, nausea, vomiting, fever, chills, headache, denies mucus in stools, weight loss, night sweats, fatigue, shortness of breath. Denies ever having a colonoscopy.   "

## 2017-06-22 NOTE — PROGRESS NOTES
"Chief Complaint   Patient presents with   • Other     stomach concerns (uncomfort and soreness)       HISTORY OF PRESENT ILLNESS: Sandi Peterson is an established 72 y.o. female here to discuss the evaluation and management of:    Left lower quadrant pain  Patient states 1 week ago she started experiencing cramping on the left side of her abdomen. States the cramping was from midline of left lower abdominal quadrant to left lower outer quadrant. States at the same she felt a \"bubble bouncing when walking\" located left of bladder region. States it feels like something is hanging out and bouncing. States the next day she experienced abdominal burning and it is an intermittent burning pain that keeps reoccurring. States the \"bubble/lump sensation is intermittent, reoccurring sensation. States she can not feel the \"bubble/lump\" with hands. States it reminds her of pregnancy flutters. States it sore to lay on left side. States she took Ex-Lax 5 days before developing the above symptoms. States she took the Ex-Lax because she was feeling a kink on left side.  States she is unable to recall how many bowel movements.States 3 days after developing the above symptoms she has taken a cup full of Raina Lax in the am every day until today. Raina-Lax from Saturday June 17, 2017 - June 20, 2017. States she was going for 2-3 hours and stool is soft stools. States it is not diarrhea.    States she usually takes Raina Lax every 3 weeks for 3 days but states it makes her regular. States she was only going once a day.   States she has an urology appointment in two weeks. States she believes she has urethral caruncle that she recently discovered on her own. States she has a history of blood clots that were discovered on June 2013 and was told it may have been due to hormonal replacement treatment. States she had a hysterectomy early 90's.  Denies melena, hematochezia, nausea, vomiting, fever, chills, headache, denies mucus in stools, " weight loss, night sweats, fatigue, shortness of breath. Denies ever having a colonoscopy.         Patient Active Problem List    Diagnosis Date Noted   • Left lower quadrant pain 06/22/2017   • Lesion of throat 03/04/2016   • Neuropathy (CMS-HCC) 03/04/2016   • Sleep disorder 11/16/2015   • Hypothyroidism 07/09/2015   • Shortness of breath on exertion 07/09/2015   • Elevated blood sugar 07/09/2015   • Gout 05/13/2015   • CKD (chronic kidney disease) stage 3, GFR 30-59 ml/min 05/13/2015   • HTN (hypertension) 04/06/2015   • Hyperlipidemia 04/06/2015   • History of pulmonary embolism 04/23/2014       Allergies:Ace inhibitors; Clindamycin; Lisinopril; Mobic; Nsaids; Prednisone; and Pseudoephedrine hcl    Current Outpatient Prescriptions   Medication Sig Dispense Refill   • clonazepam (KLONOPIN) 0.5 MG Tab Take 0.5 Tabs by mouth 1 time daily as needed. 30 Tab 1   • Cyanocobalamin (VITAMIN B 12) 250 MCG Lozenge Take  by mouth every day.     • XARELTO 20 MG Tab tablet TAKE 1 TABLET BY MOUTH EVERY DAY 30 Tab 5   • metoprolol (LOPRESSOR) 25 MG Tab TAKE ONE-HALF TABLET BY MOUTH EVERY DAY 30 Tab 2   • spironolactone (ALDACTONE) 25 MG Tab TAKE ONE-HALF TABLET BY MOUTH EVERY OTHER DAY AS NEEDED 30 Tab 2   • ondansetron (ZOFRAN ODT) 4 MG TABLET DISPERSIBLE DISSOLVE ONE TABLET BY MOUTH EVERY 8 HOURS AS NEEDED FOR NAUSEA AND VOMITING 12 Tab 3   • therapeutic multivitamin-minerals (THERAGRAN-M) Tab Take 1 Tab by mouth every day.     • levothyroxine (SYNTHROID) 50 MCG Tab Take 1 Tab by mouth Every morning on an empty stomach. 90 Tab 3   • Cholecalciferol (VITAMIN D) 2000 UNITS Cap Take 1,000 Units by mouth every day at 6 PM.     • MELATONIN PO Take  by mouth.     • acetaminophen (TYLENOL) 325 MG TABS Take 650 mg by mouth every four hours as needed. Indications: Pain     • famotidine (PEPCID) 20 MG TABS Take 10 mg by mouth 2 times a day as needed. For GERD     • lidocaine (XYLOCAINE) 5 % Ointment Apply 1 Application to affected  "area(s) as needed. 1 Tube 3     No current facility-administered medications for this visit.       Social History   Substance Use Topics   • Smoking status: Never Smoker    • Smokeless tobacco: Never Used   • Alcohol Use: No       Family Status   Relation Status Death Age   • Mother       Breast Cancer   • Father       Cancer   • Brother       Cacer, Thyroid problems   History reviewed. No pertinent family history.    ROS:  Review of Systems   Constitutional: Negative for fever, chills, weight loss and malaise/fatigue.   HENT: Negative for ear pain, nosebleeds, congestion, sore throat and neck pain.    Eyes: Negative for blurred vision.   Respiratory: Negative for cough, sputum production, shortness of breath and wheezing.    Cardiovascular: Negative for chest pain, palpitations, orthopnea and leg swelling.   Gastrointestinal: Negative for heartburn, nausea, vomiting and positive left lower abdominal quadrant pain.   Genitourinary: Negative for dysuria, urgency and frequency.   Musculoskeletal: Negative for myalgias, back pain and joint pain.   Skin: Negative for rash and itching.   Neurological: Negative for dizziness, tingling, tremors, sensory change, focal weakness and headaches.   Endo/Heme/Allergies: Does not bruise/bleed easily.   Psychiatric/Behavioral: Negative for depression, suicidal ideas and memory loss.  The patient is not nervous/anxious and does not have insomnia.    All other systems reviewed and are negative except as in HPI.    Exam: Blood pressure 150/90, pulse 91, temperature 36.9 °C (98.4 °F), height 1.575 m (5' 2.01\"), weight 71.9 kg (158 lb 8.2 oz), SpO2 97 %. Body mass index is 28.98 kg/(m^2).  General: Normal appearing. No distress.  HEENT: Normocephalic. Eyes conjunctiva clear lids without ptosis, pupils equal and reactive to light accommodation, ears normal shape and contour, canals are clear bilaterally, tympanic membranes are benign, nasal mucosa benign, oropharynx is " "without erythema, edema or exudates.   Neck: Supple without JVD or bruit. Thyroid is not enlarged.  Pulmonary: Clear to ausculation.  Normal effort. No rales, ronchi, or wheezing.  Cardiovascular: Regular rate and rhythm without murmur. Carotid and radial pulses are intact and equal bilaterally.  Abdomen: Soft, nontender, nondistended. Normal bowel sounds. Liver and spleen are not palpable. When performing abdominal exam felt a small bulge in the abdominal in left lower abdominal quadrant wall left of bladder region and located above hysterectomy scar.  Neurologic: Grossly nonfocal.  Cranial nerves are normal. DTR's normal and symmetric.  Lymph: No cervical, supraclavicular or axillary lymph nodes are palpable  Skin: Warm and dry.  No rashes or suspicious skin lesions.  Musculoskeletal: Normal gait. No extremity cyanosis, clubbing, or edema.  Psych: Normal mood and affect. Alert and oriented x3. Judgment and insight is normal.    Medical decision-making and discussion: Patient is a 72-year-old female who is here today to discuss left lower abdominal quadrant discomfort and soreness. States for one week she has been feeling left lower abdominal quadrant discomfort. States she feels a \"lump/bubble\" in lower left dominant quadrant left bladder. When performing physical exam felt a small bulge/in the left lower abdominal quadrant left bladder region above hysterectomy scar. A CT of abdomen has been ordered with and without contrast to further evaluate abdominal discomfort and abdominal bulge. A CBC and a non fasting CMP have been ordered to evaluate patient's liver and kidney function, infection, creatinine levels. Patient will follow-up in 2 weeks to discuss test results. Advised patient that many time symptoms get worse or develops shortness of breath to seek immediate emergency care.    Please note that this dictation was created using voice recognition software. I have made every reasonable attempt to correct " obvious errors, but I expect that there are errors of grammar and possibly content that I did not discover before finalizing the note.    Assessment/Plan:  1. Left lower quadrant pain  CT-ABDOMEN-PELVIS WITH & W/O    CBC WITHOUT DIFFERENTIAL    COMP METABOLIC PANEL       Return in about 2 weeks (around 7/6/2017).

## 2017-06-23 ENCOUNTER — TELEPHONE (OUTPATIENT)
Dept: MEDICAL GROUP | Facility: PHYSICIAN GROUP | Age: 72
End: 2017-06-23

## 2017-06-23 ENCOUNTER — HOSPITAL ENCOUNTER (OUTPATIENT)
Dept: RADIOLOGY | Facility: MEDICAL CENTER | Age: 72
End: 2017-06-23
Attending: PHYSICIAN ASSISTANT
Payer: MEDICARE

## 2017-06-23 ENCOUNTER — TELEPHONE (OUTPATIENT)
Dept: MEDICAL GROUP | Facility: MEDICAL CENTER | Age: 72
End: 2017-06-23

## 2017-06-23 DIAGNOSIS — R10.32 LEFT LOWER QUADRANT PAIN: ICD-10-CM

## 2017-06-23 PROCEDURE — 74177 CT ABD & PELVIS W/CONTRAST: CPT

## 2017-06-23 PROCEDURE — 700117 HCHG RX CONTRAST REV CODE 255: Performed by: PHYSICIAN ASSISTANT

## 2017-06-23 RX ADMIN — IOHEXOL 100 ML: 350 INJECTION, SOLUTION INTRAVENOUS at 08:55

## 2017-06-23 NOTE — TELEPHONE ENCOUNTER
----- Message from Xochitl Estrella PA-C sent at 6/23/2017  7:05 AM PDT -----  Please call patient. I have reviewed non fasting lab work. Glucose was mildly elevated (105), but patient was not fasting. Nothing to be concerned about at this time. GFR (Kidney test) is back to >60 which is in normal limits.     Thank you,    Jennifer PORTILLO

## 2017-06-23 NOTE — TELEPHONE ENCOUNTER
Radiology called with results of CT Scan.  No Diverticulitis or inflamation present but Diverticulosis is present

## 2017-06-26 ENCOUNTER — TELEPHONE (OUTPATIENT)
Dept: MEDICAL GROUP | Facility: MEDICAL CENTER | Age: 72
End: 2017-06-26

## 2017-06-26 NOTE — TELEPHONE ENCOUNTER
1. Caller Name: Sandi                                         Call Back Number: [phone] (home)       Patient approves a detailed voicemail message: yes    2. Patient is requesting imaging - CT of Abdomen, done on 6/23/17 results dated: 6/23/17    3. Confirmed results are in chart. Patient advised they will be contacted once interpreted by provider.

## 2017-06-26 NOTE — TELEPHONE ENCOUNTER
Pt. Scheduled an penelope with Lakisha tomorrow and will discuss her request then.   Lakisha, just FYI...

## 2017-06-27 ENCOUNTER — OFFICE VISIT (OUTPATIENT)
Dept: MEDICAL GROUP | Facility: MEDICAL CENTER | Age: 72
End: 2017-06-27
Payer: MEDICARE

## 2017-06-27 VITALS
SYSTOLIC BLOOD PRESSURE: 132 MMHG | WEIGHT: 157 LBS | HEART RATE: 78 BPM | HEIGHT: 62 IN | DIASTOLIC BLOOD PRESSURE: 80 MMHG | TEMPERATURE: 99.3 F | RESPIRATION RATE: 18 BRPM | OXYGEN SATURATION: 94 % | BODY MASS INDEX: 28.89 KG/M2

## 2017-06-27 DIAGNOSIS — M81.0 OSTEOPOROSIS, UNSPECIFIED OSTEOPOROSIS TYPE, UNSPECIFIED PATHOLOGICAL FRACTURE PRESENCE: ICD-10-CM

## 2017-06-27 DIAGNOSIS — R31.9 URINARY TRACT INFECTION WITH HEMATURIA, SITE UNSPECIFIED: ICD-10-CM

## 2017-06-27 DIAGNOSIS — R10.32 LEFT LOWER QUADRANT PAIN: ICD-10-CM

## 2017-06-27 DIAGNOSIS — N39.0 URINARY TRACT INFECTION WITH HEMATURIA, SITE UNSPECIFIED: ICD-10-CM

## 2017-06-27 LAB
APPEARANCE UR: CLEAR
BILIRUB UR STRIP-MCNC: NORMAL MG/DL
COLOR UR AUTO: YELLOW
GLUCOSE UR STRIP.AUTO-MCNC: NEGATIVE MG/DL
KETONES UR STRIP.AUTO-MCNC: NORMAL MG/DL
LEUKOCYTE ESTERASE UR QL STRIP.AUTO: NORMAL
NITRITE UR QL STRIP.AUTO: NEGATIVE
PH UR STRIP.AUTO: 5 [PH] (ref 5–8)
PROT UR QL STRIP: NORMAL MG/DL
RBC UR QL AUTO: NORMAL
SP GR UR STRIP.AUTO: 1.03
UROBILINOGEN UR STRIP-MCNC: 0.2 MG/DL

## 2017-06-27 PROCEDURE — 99214 OFFICE O/P EST MOD 30 MIN: CPT | Performed by: PHYSICIAN ASSISTANT

## 2017-06-27 PROCEDURE — 81002 URINALYSIS NONAUTO W/O SCOPE: CPT | Performed by: PHYSICIAN ASSISTANT

## 2017-06-27 RX ORDER — SULFAMETHOXAZOLE AND TRIMETHOPRIM 800; 160 MG/1; MG/1
1 TABLET ORAL 2 TIMES DAILY
Qty: 6 TAB | Refills: 0 | Status: SHIPPED | OUTPATIENT
Start: 2017-06-27 | End: 2017-07-12

## 2017-06-27 NOTE — PROGRESS NOTES
"Chief Complaint   Patient presents with   • Other     patient wants to discuss an antibiotic       HPI  Sandi Peterson is a 72 y.o. female here today for follow-up on LL abd pain and burning sensation X 2 wks. Normal abd CT and  CBC, however patient states that her white count normally <6. Even though her white count is within normal limits at 10.2, that is elevated for her.Pos constipation w rock looking stool and green mucus. Positive nausea one time without any vomiting. Has tried miralax 4 days in a row w/o much help. She has tried soft diet after Ct scan which helped improve her symptoms. However pain returned when she tried solid food sandwich. Pain has been waxing and waning. Pain is worse after eating and she feels bloated.      Patient has appointment with urologist next week.   Positive large hiatal hernia.      Past medical, surgical, family, and social history is reviewed in Epic chart by me today.   Medications and allergies reviewed and updated in Epic chart by me today.     ROS:   As documented in history of present illness above    Exam:  Blood pressure 132/80, pulse 78, temperature 37.4 °C (99.3 °F), resp. rate 18, height 1.575 m (5' 2.01\"), weight 71.215 kg (157 lb), SpO2 94 %.  Constitutional: Alert, no distress, plus 3 vital signs  Skin:  Warm, dry, no rashes invisible areas  Respiratory: Unlabored respiratory effort, lungs clear to auscultation, no wheezes, no rhonchi  Cardiovascular: RRR, n  Abdomen: Soft, nontender, no masses noted, bowel sounds present Psych: Alert, pleasant, well-groomed, normal affect    A/P:  1. Left lower quadrant pain  Normal abd CT   - REFERRAL TO GASTROENTEROLOGY  - POCT Urinalysis --> pos blood (pt is on xarelto), trace leuk    2. UTI  -- sulfamethoxazole-trimethoprim (BACTRIM DS) 800-160 MG tablet; Take 1 Tab by mouth 2 times a day.  Dispense: 6 Tab; Refill: 0      3. Osteoporosis, unspecified osteoporosis type, unspecified pathological fracture presence    - " DS-BONE DENSITY STUDY (DEXA); Future  - REFERRAL TO GASTROENTEROLOGY    We discussed red flags incuding worsening pain, pressure, shortness of breath or overall decline in health. Patient verbalize understanding and will either call our office or present to the emergency room.     F/u prn

## 2017-06-29 ENCOUNTER — TELEPHONE (OUTPATIENT)
Dept: MEDICAL GROUP | Facility: PHYSICIAN GROUP | Age: 72
End: 2017-06-29

## 2017-06-29 NOTE — TELEPHONE ENCOUNTER
----- Message from Xochitl Estrella PA-C sent at 6/28/2017  5:34 PM PDT -----  Please call patient. I have patient's CT results and there is diverticulosis without diverticulitis (no active inflammation). Diverticulosis is small, budging pouches that develop in the digestive tract. Diverticulitis is when the diverticula become inflamed or infected.  There is a large hiatal hernia.       Thank you,    Jennifer PORTILLO

## 2017-07-12 ENCOUNTER — OFFICE VISIT (OUTPATIENT)
Dept: MEDICAL GROUP | Facility: PHYSICIAN GROUP | Age: 72
End: 2017-07-12
Payer: MEDICARE

## 2017-07-12 VITALS
SYSTOLIC BLOOD PRESSURE: 132 MMHG | BODY MASS INDEX: 28.76 KG/M2 | WEIGHT: 156.31 LBS | HEART RATE: 86 BPM | RESPIRATION RATE: 16 BRPM | TEMPERATURE: 98.2 F | DIASTOLIC BLOOD PRESSURE: 86 MMHG | OXYGEN SATURATION: 93 % | HEIGHT: 62 IN

## 2017-07-12 DIAGNOSIS — I10 ESSENTIAL HYPERTENSION: ICD-10-CM

## 2017-07-12 DIAGNOSIS — E03.9 ACQUIRED HYPOTHYROIDISM: ICD-10-CM

## 2017-07-12 DIAGNOSIS — G62.9 PERIPHERAL POLYNEUROPATHY: ICD-10-CM

## 2017-07-12 DIAGNOSIS — Z86.711 HISTORY OF PULMONARY EMBOLISM: ICD-10-CM

## 2017-07-12 DIAGNOSIS — R11.2 NAUSEA AND VOMITING, INTRACTABILITY OF VOMITING NOT SPECIFIED, UNSPECIFIED VOMITING TYPE: ICD-10-CM

## 2017-07-12 DIAGNOSIS — N18.30 CKD (CHRONIC KIDNEY DISEASE) STAGE 3, GFR 30-59 ML/MIN (HCC): ICD-10-CM

## 2017-07-12 DIAGNOSIS — Z79.01 CHRONIC ANTICOAGULATION: ICD-10-CM

## 2017-07-12 PROBLEM — E78.00 PURE HYPERCHOLESTEROLEMIA: Status: ACTIVE | Noted: 2017-07-12

## 2017-07-12 PROBLEM — R10.32 LEFT LOWER QUADRANT PAIN: Status: RESOLVED | Noted: 2017-06-22 | Resolved: 2017-07-12

## 2017-07-12 PROCEDURE — 99215 OFFICE O/P EST HI 40 MIN: CPT | Performed by: FAMILY MEDICINE

## 2017-07-12 RX ORDER — LEVOTHYROXINE SODIUM 0.05 MG/1
50 TABLET ORAL
Qty: 90 TAB | Refills: 3 | Status: SHIPPED | OUTPATIENT
Start: 2017-07-12 | End: 2018-07-27 | Stop reason: SDUPTHER

## 2017-07-12 RX ORDER — SPIRONOLACTONE 25 MG/1
12.5 TABLET ORAL DAILY
Qty: 45 TAB | Refills: 3 | Status: SHIPPED | OUTPATIENT
Start: 2017-07-12 | End: 2018-09-03 | Stop reason: SDUPTHER

## 2017-07-12 RX ORDER — CLONAZEPAM 0.5 MG/1
0.25 TABLET ORAL
Qty: 45 TAB | Refills: 3 | Status: SHIPPED
Start: 2017-07-12 | End: 2019-07-10 | Stop reason: SDUPTHER

## 2017-07-12 RX ORDER — ONDANSETRON 4 MG/1
4 TABLET, ORALLY DISINTEGRATING ORAL EVERY 8 HOURS PRN
Qty: 30 TAB | Refills: 3 | Status: SHIPPED | OUTPATIENT
Start: 2017-07-12 | End: 2019-06-28

## 2017-07-12 NOTE — Clinical Note
[facility]  Maricruz Phelan M.D.  [address]  [place] [address]  Fax: [phone]   Authorization for Release/Disclosure of   Protected Health Information   Name: SANDI MESA : 1945 SSN: [id number]   Address: [facility] [id number]  [place] [address] Phone:    [phone] (home)    I authorize the entity listed below to release/disclose the PHI below to:   [facility]/Maricruz Phelan M.D. and Maricruz Phelan M.D.   Provider or Entity Name:     Address   City, [facility], [place], [place]  Phone:      Fax:     Reason for request: continuity of care   Information to be released:    [  ] LAST COLONOSCOPY,  including any PATH REPORT and follow-up  [  ] LAST FIT/COLOGUARD RESULT [  ] LAST DEXA  [  ] LAST MAMMOGRAM  [  ] LAST PAP  [  ] LAST LABS [  ] RETINA EXAM REPORT  [  ] IMMUNIZATION RECORDS  [ X ] Release all info      [  ] Check here and initial the line next to each item to release ALL health information INCLUDING  _____ Care and treatment for drug and / or alcohol abuse  _____ HIV testing, infection status, or AIDS  _____ Genetic Testing    DATES OF SERVICE OR TIME PERIOD TO BE DISCLOSED: _____________  I understand and acknowledge that:  * This Authorization may be revoked at any time by you in writing, except if your health information has already been used or disclosed.  * Your health information that will be used or disclosed as a result of you signing this authorization could be re-disclosed by the recipient. If this occurs, your re-disclosed health information may no longer be protected by State or Federal laws.  * You may refuse to sign this Authorization. Your refusal will not affect your ability to obtain treatment.  * This Authorization becomes effective upon signing and will  on (date) __________.      If no date is indicated, this Authorization will  one (1) year from the signature date.    Name: Sandi Mesa    Signature:   Date:     2017            PLEASE FAX REQUESTED RECORDS BACK TO: [phone]

## 2017-07-12 NOTE — MR AVS SNAPSHOT
"        Sandi Hesterford   2017 10:40 AM   Office Visit   MRN: [id number]    Department:  [facility]   Dept Phone:  [phone]    Description:  Female : 1945   Provider:  Maricruz Phelan M.D.           Reason for Visit     Medication Refill Klonopin, metoprolol, spironlactone, Zofran, levothyroxine (90 day)      Allergies as of 2017     Allergen Noted Reactions    Ace Inhibitors 2011       Ciprofloxacin 2017       Clindamycin 08/15/2011       Lisinopril 07/10/2013   Vomiting    Mobic 2012       Nsaids 2015       Can not take with Xarelto     Prednisone 2011       \"flu like symptoms\"    Pseudoephedrine Hcl 2008         You were diagnosed with     History of pulmonary embolism   [696780]       Chronic anticoagulation   [365396]       Essential hypertension   [8460762]       Acquired hypothyroidism   [9155984]       CKD (chronic kidney disease) stage 3, GFR 30-59 ml/min   [913164]       Cyst of neck   [3825231]       Peripheral polyneuropathy (CMS-HCC)   [574166]       Nausea and vomiting, intractability of vomiting not specified, unspecified vomiting type   [0922705]         Vital Signs     Blood Pressure Pulse Temperature Respirations Height Weight    132/86 mmHg 86 36.8 °C (98.2 °F) 16 1.575 m (5' 2.01\") 70.9 kg (156 lb 4.9 oz)    Body Mass Index Oxygen Saturation Smoking Status             28.58 kg/m2 93% Never Smoker          Basic Information     Date Of Birth Sex Race Ethnicity Preferred Language    1945 Female White Non- English      Your appointments     Oct 13, 2017  1:00 PM   [facility]    [address]  [address]  [place] [address] [phone]           You will be receiving a confirmation call a few days before your appointment from our automated call confirmation system.              Problem List              ICD-10-CM Priority Class Noted - Resolved    History " of pulmonary embolism Z86.711   4/23/2014 - Present    Essential hypertension I10   4/6/2015 - Present    History of gout Z87.39   5/13/2015 - Present    CKD (chronic kidney disease) stage 3, GFR 30-59 ml/min N18.3   5/13/2015 - Present    Acquired hypothyroidism E03.9   7/9/2015 - Present    Primary insomnia F51.01   11/16/2015 - Present    Pure hypercholesterolemia E78.00   7/12/2017 - Present    Chronic anticoagulation Z79.01   7/12/2017 - Present    Cyst of neck L72.3   7/12/2017 - Present    Peripheral polyneuropathy (CMS-HCC) G62.9   7/12/2017 - Present      Health Maintenance        Date Due Completion Dates    BONE DENSITY 6/11/2008 ---    IMM ZOSTER VACCINE 1/18/2018 (Originally 6/11/2003) ---    IMM INFLUENZA (1) 9/1/2017 9/27/2016, 11/12/2012    IMM DTaP/Tdap/Td Vaccine (2 - Td) 5/14/2022 5/14/2012            Current Immunizations     13-VALENT PCV PREVNAR 11/12/2012    Influenza TIV (IM) 11/12/2012    Influenza Vaccine Adult HD 9/27/2016    Pneumococcal polysaccharide vaccine (PPSV-23) 10/6/2011    Tdap Vaccine 5/14/2012      Below and/or attached are the medications your provider expects you to take. Review all of your home medications and newly ordered medications with your provider and/or pharmacist. Follow medication instructions as directed by your provider and/or pharmacist. Please keep your medication list with you and share with your provider. Update the information when medications are discontinued, doses are changed, or new medications (including over-the-counter products) are added; and carry medication information at all times in the event of emergency situations     Allergies:  ACE INHIBITORS - (reactions not documented)     CIPROFLOXACIN - (reactions not documented)     CLINDAMYCIN - (reactions not documented)     LISINOPRIL - Vomiting     MOBIC - (reactions not documented)     NSAIDS - (reactions not documented)     PREDNISONE - (reactions not documented)     PSEUDOEPHEDRINE HCL -  (reactions not documented)               Medications  Valid as of: July 12, 2017 - 11:24 AM    Generic Name Brand Name Tablet Size Instructions for use    Acetaminophen (Tab) TYLENOL 325 MG Take 650 mg by mouth every four hours as needed. Indications: Pain        Cholecalciferol (Cap) Vitamin D 2000 UNITS Take 1,000 Units by mouth every day at 6 PM.        ClonazePAM (Tab) KLONOPIN 0.5 MG Take 0.5 Tabs by mouth 1 time daily as needed (severe neuropathy or anxiety).        Cyanocobalamin (Lozenge) Vitamin B 12 250 MCG Take  by mouth every day.        Famotidine (Tab) PEPCID 20 MG Take 10 mg by mouth 2 times a day as needed. For GERD        Levothyroxine Sodium (Tab) SYNTHROID 50 MCG Take 1 Tab by mouth Every morning on an empty stomach.        Melatonin   Take  by mouth.        Metoprolol Tartrate (Tab) LOPRESSOR 25 MG Take 0.5 Tabs by mouth every day.        Multiple Vitamins-Minerals (Tab) THERAGRAN-M  Take 1 Tab by mouth every day.        Ondansetron (TABLET DISPERSIBLE) ZOFRAN ODT 4 MG Take 1 Tab by mouth every 8 hours as needed for Nausea/Vomiting. AS NEEDED FOR NAUSEA AND VOMITING        Rivaroxaban (Tab) XARELTO 20 MG TAKE 1 TABLET BY MOUTH EVERY DAY        Spironolactone (Tab) ALDACTONE 25 MG Take 0.5 Tabs by mouth every day.        .                 Medicines prescribed today were sent to:     [facility] [id number] [place] [address], [place] - [address] AT [facility] [address]    [address] [facility] [address]    Phone: [phone] Fax: [phone]    Open 24 Hours?: No      Medication refill instructions:       If your prescription bottle indicates you have medication refills left, it is not necessary to call your provider’s office. Please contact your pharmacy and they will refill your medication.    If your prescription bottle indicates you do not have any refills left, you may request refills at any time through one of the following ways: The online [employer] system (except Urgent Care),  by calling your provider’s office, or by asking your pharmacy to contact your provider’s office with a refill request. Medication refills are processed only during regular business hours and may not be available until the next business day. Your provider may request additional information or to have a follow-up visit with you prior to refilling your medication.   *Please Note: Medication refills are assigned a new Rx number when refilled electronically. Your pharmacy may indicate that no refills were authorized even though a new prescription for the same medication is available at the pharmacy. Please request the medicine by name with the pharmacy before contacting your provider for a refill.           [employer] Access Code: Activation code not generated  Current [employer] Status: Active

## 2017-07-12 NOTE — PROGRESS NOTES
Sandi Peterson is a 72 y.o. female here to establish care and discuss her chronic medical conditions and medication refills.    HPI:  Sandi is a charming 72-year-old female here to establish care. Her previous PCP was Dr. Strauss. 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    History of pulmonary embolism  Diagnosed in 2013 and again in 2016 with pulmonary embolism. It was unprovoked, but there is thought that it my have been HRT related. She is on Xarelto for life. Follows with Dr. Luna.    CKD (chronic kidney disease) stage 3, GFR 30-59 ml/min  Found while in the hospital. Follows with Dr. Mckeon yearly. Most recent GFR was normal and above 60. At the lowest, it was 49.    Cyst of neck  Follows with Dr. Bhatia for neck cyst. No surgery is planned at this time.    Essential hypertension  Stable. Monitoring BP at home. Currently taking metoprolol, spironolactone as directed. Also taking baby aspirin. Denies lightheadedness, vision changes, headache, palpitations or leg swelling.    Acquired hypothyroidism  Labwork reviewed and up to date. Taking medicine as directed. Denies palpitations, skin changes, temperature intolerance, changes in bowel habits.    Peripheral polyneuropathy (CMS-HCC)  Has burning pain on the bottom of her feet. Takes B12 daily. Has also found clonazepam to be helpful and takes this as needed. Due for lab recheck.    Current medicines (including changes today)  Current Outpatient Prescriptions   Medication Sig Dispense Refill   • clonazepam (KLONOPIN) 0.5 MG Tab Take 0.5 Tabs by mouth 1 time daily as needed (severe neuropathy or anxiety). 45 Tab 3   • ondansetron (ZOFRAN ODT) 4 MG TABLET DISPERSIBLE Take 1 Tab by mouth every 8 hours as needed for Nausea/Vomiting. AS NEEDED FOR NAUSEA AND VOMITING 30 Tab 3   • metoprolol (LOPRESSOR) 25 MG Tab Take 0.5 Tabs by mouth every day. 45 Tab 3   • spironolactone (ALDACTONE) 25 MG Tab Take 0.5 Tabs by mouth every day. 45 Tab 3   • levothyroxine  (SYNTHROID) 50 MCG Tab Take 1 Tab by mouth Every morning on an empty stomach. 90 Tab 3   • Cyanocobalamin (VITAMIN B 12) 250 MCG Lozenge Take  by mouth every day.     • XARELTO 20 MG Tab tablet TAKE 1 TABLET BY MOUTH EVERY DAY 30 Tab 5   • therapeutic multivitamin-minerals (THERAGRAN-M) Tab Take 1 Tab by mouth every day.     • Cholecalciferol (VITAMIN D) 2000 UNITS Cap Take 1,000 Units by mouth every day at 6 PM.     • MELATONIN PO Take  by mouth.     • acetaminophen (TYLENOL) 325 MG TABS Take 650 mg by mouth every four hours as needed. Indications: Pain     • famotidine (PEPCID) 20 MG TABS Take 10 mg by mouth 2 times a day as needed. For GERD       No current facility-administered medications for this visit.     She  has a past medical history of Hypertension; Arthritis; Hypoglycemia; Anxiety; Hyperlipidemia; GERD (gastroesophageal reflux disease); PE (pulmonary embolism); DVT (deep venous thrombosis) (CMS-[facility]); Hypothyroid; Allergic rhinitis; Back pain; Hypothyroidism; Pneumonia; Gout; and Hiatal hernia.  She  has past surgical history that includes basal cell excision; enlarge breast with implant; hysterectomy, total abdominal; and tonsillectomy.  Social History   Substance Use Topics   • Smoking status: Never Smoker    • Smokeless tobacco: Never Used   • Alcohol Use: No     Social History     Social History Narrative     History reviewed. No pertinent family history.  Family Status   Relation Status Death Age   • Mother       Breast Cancer   • Father       Cancer   • Brother       Cacer, Thyroid problems     ROS  Constitutional: Negative for fever, chills and malaise/fatigue.   HENT: Negative for congestion.    Eyes: Negative for pain.   Respiratory: Negative for cough and shortness of breath.    Cardiovascular: Negative for leg swelling.   Gastrointestinal: Negative for nausea, vomiting, abdominal pain and diarrhea.   Genitourinary: Negative for dysuria and hematuria.   Skin: Negative for rash.  "  Neurological: Negative for dizziness, focal weakness and headaches.   Endo/Heme/Allergies: Does not bruise/bleed easily.   Psychiatric/Behavioral: Negative for depression.  The patient is not nervous/anxious.       Objective:     Physical Exam:  Blood pressure 132/86, pulse 86, temperature 36.8 °C (98.2 °F), resp. rate 16, height 1.575 m (5' 2.01\"), weight 70.9 kg (156 lb 4.9 oz), SpO2 93 %. Body mass index is 28.58 kg/(m^2).  Constitutional: Alert, no distress. Pleasant and cooperative.  Skin: Warm, dry, good turgor, no rashes in visible areas.  Eye: Equal, round and reactive, conjunctiva clear, lids normal.  ENMT: Lips without lesions, good dentition, oropharynx clear.  Neck: Trachea midline, no masses, no thyromegaly.  Respiratory: Unlabored respiratory effort, no cough.  Psych: Alert and oriented x3, normal affect and mood.    Assessment and Plan:     1. History of pulmonary embolism  2. Chronic anticoagulation  Patient has history of recurrent VTE. She is on lifelong anticoagulation. Follows with Dr. Luna.    3. Essential hypertension  Well-controlled. Labs as indicated. Continue antihypertensive medications. Discussed decreasing salt intake. Emphasized benefits of exercise and diet. Continue to monitor.  - metoprolol (LOPRESSOR) 25 MG Tab; Take 0.5 Tabs by mouth every day.  Dispense: 45 Tab; Refill: 3  - spironolactone (ALDACTONE) 25 MG Tab; Take 0.5 Tabs by mouth every day.  Dispense: 45 Tab; Refill: 3    4. Acquired hypothyroidism  Continue thyroid medication. Instructed patient to take on empty stomach with glass of water, 30 minutes prior to food or other medications. Labs as indicated.  - levothyroxine (SYNTHROID) 50 MCG Tab; Take 1 Tab by mouth Every morning on an empty stomach.  Dispense: 90 Tab; Refill: 3    5. CKD (chronic kidney disease) stage 3, GFR 30-59 ml/min  Chronic and stable. Most recent GFR is normal. Follows with Dr. Mckeon.    6. Cyst of neck  Chronic and stable. Follows with Dr." Sriram. No surgical intervention is planned at this time.    7. Peripheral polyneuropathy (CMS-HCC)  Chronic and stable. Patient has responded well with very low dose of clonazepam. Also taking B12. She would like to continue this medication. PDMP reviewed and accurate. Cautioned her not to drive or drink alcohol while on this medication.  - clonazepam (KLONOPIN) 0.5 MG Tab; Take 0.5 Tabs by mouth 1 time daily as needed (severe neuropathy or anxiety).  Dispense: 45 Tab; Refill: 3    8. Nausea and vomiting, intractability of vomiting not specified, unspecified vomiting type  Patient has occasional nausea and vomiting when she has an illness. She likes to have odansetron on hand. Refill sent to pharmacy.  - ondansetron (ZOFRAN ODT) 4 MG TABLET DISPERSIBLE; Take 1 Tab by mouth every 8 hours as needed for Nausea/Vomiting. AS NEEDED FOR NAUSEA AND VOMITING  Dispense: 30 Tab; Refill: 3    Total of 50 minutes face-to-face time spent with patient, with greater than 50% of the total time discussing patient's issues and symptoms as listed above in assessment and plan, as well as managing coordination of care for future evaluation and treatment.    Records reviewed in Epic.  Followup: Return in about 3 months (around 10/12/2017) for f/u lab results, update from [facility].         PLEASE NOTE: This dictation was created using voice recognition software. I have made every reasonable attempt to correct obvious errors, but I expect that there are errors of grammar and possibly content that I did not discover before finalizing the note.

## 2017-07-12 NOTE — ASSESSMENT & PLAN NOTE
Stable. Monitoring BP at home. Currently taking metoprolol, spironolactone as directed. Also taking baby aspirin. Denies lightheadedness, vision changes, headache, palpitations or leg swelling.

## 2017-07-12 NOTE — ASSESSMENT & PLAN NOTE
Found while in the hospital. Follows with Dr. Mckeon yearly. Most recent GFR was normal and above 60. At the lowest, it was 49.

## 2017-07-12 NOTE — ASSESSMENT & PLAN NOTE
Diagnosed in 2013 and again in 2016 with pulmonary embolism. It was unprovoked, but there is thought that it my have been HRT related. She is on Xarelto for life. Follows with Dr. Luna.

## 2017-07-12 NOTE — ASSESSMENT & PLAN NOTE
Labwork reviewed and up to date. Taking medicine as directed. Denies palpitations, skin changes, temperature intolerance, changes in bowel habits.

## 2017-08-30 DIAGNOSIS — I27.82 OTHER CHRONIC PULMONARY EMBOLISM: ICD-10-CM

## 2017-08-31 RX ORDER — RIVAROXABAN 20 MG/1
TABLET, FILM COATED ORAL
Qty: 30 TAB | Refills: 5 | Status: SHIPPED | OUTPATIENT
Start: 2017-08-31 | End: 2018-02-05 | Stop reason: SDUPTHER

## 2017-08-31 NOTE — TELEPHONE ENCOUNTER
Have we ever prescribed this med? Yes.  If yes, what date? 03/09/17    Last OV: 05/17/17-[facility]     Next OV: due 1 year     DX: Pulmonary Embolism     Medications:   Requested Prescriptions     Pending Prescriptions Disp Refills   • XARELTO 20 MG Tab tablet [Pharmacy Med Name: XARELTO 20MG TABLETS] 30 Tab 5     Sig: TAKE 1 TABLET BY MOUTH EVERY DAY

## 2017-09-20 ENCOUNTER — HOSPITAL ENCOUNTER (OUTPATIENT)
Dept: LAB | Facility: MEDICAL CENTER | Age: 72
End: 2017-09-20
Attending: FAMILY MEDICINE
Payer: MEDICARE

## 2017-09-20 DIAGNOSIS — G62.9 NEUROPATHY: ICD-10-CM

## 2017-09-20 DIAGNOSIS — E03.9 ACQUIRED HYPOTHYROIDISM: ICD-10-CM

## 2017-09-20 LAB
ALBUMIN SERPL BCP-MCNC: 4.2 G/DL (ref 3.2–4.9)
ALBUMIN/GLOB SERPL: 1.5 G/DL
ALP SERPL-CCNC: 79 U/L (ref 30–99)
ALT SERPL-CCNC: 13 U/L (ref 2–50)
ANION GAP SERPL CALC-SCNC: 10 MMOL/L (ref 0–11.9)
AST SERPL-CCNC: 19 U/L (ref 12–45)
BASOPHILS # BLD AUTO: 0.9 % (ref 0–1.8)
BASOPHILS # BLD: 0.05 K/UL (ref 0–0.12)
BILIRUB SERPL-MCNC: 0.7 MG/DL (ref 0.1–1.5)
BUN SERPL-MCNC: 21 MG/DL (ref 8–22)
CALCIUM SERPL-MCNC: 9.6 MG/DL (ref 8.5–10.5)
CHLORIDE SERPL-SCNC: 105 MMOL/L (ref 96–112)
CO2 SERPL-SCNC: 25 MMOL/L (ref 20–33)
CREAT SERPL-MCNC: 0.86 MG/DL (ref 0.5–1.4)
EOSINOPHIL # BLD AUTO: 0.23 K/UL (ref 0–0.51)
EOSINOPHIL NFR BLD: 4.2 % (ref 0–6.9)
ERYTHROCYTE [DISTWIDTH] IN BLOOD BY AUTOMATED COUNT: 49.8 FL (ref 35.9–50)
GFR SERPL CREATININE-BSD FRML MDRD: >60 ML/MIN/1.73 M 2
GLOBULIN SER CALC-MCNC: 2.8 G/DL (ref 1.9–3.5)
GLUCOSE SERPL-MCNC: 104 MG/DL (ref 65–99)
HCT VFR BLD AUTO: 45.7 % (ref 37–47)
HGB BLD-MCNC: 14.9 G/DL (ref 12–16)
IMM GRANULOCYTES # BLD AUTO: 0.01 K/UL (ref 0–0.11)
IMM GRANULOCYTES NFR BLD AUTO: 0.2 % (ref 0–0.9)
LYMPHOCYTES # BLD AUTO: 1.98 K/UL (ref 1–4.8)
LYMPHOCYTES NFR BLD: 35.8 % (ref 22–41)
MCH RBC QN AUTO: 30.1 PG (ref 27–33)
MCHC RBC AUTO-ENTMCNC: 32.6 G/DL (ref 33.6–35)
MCV RBC AUTO: 92.3 FL (ref 81.4–97.8)
MONOCYTES # BLD AUTO: 0.4 K/UL (ref 0–0.85)
MONOCYTES NFR BLD AUTO: 7.2 % (ref 0–13.4)
NEUTROPHILS # BLD AUTO: 2.86 K/UL (ref 2–7.15)
NEUTROPHILS NFR BLD: 51.7 % (ref 44–72)
NRBC # BLD AUTO: 0 K/UL
NRBC BLD AUTO-RTO: 0 /100 WBC
PLATELET # BLD AUTO: 242 K/UL (ref 164–446)
PMV BLD AUTO: 10.5 FL (ref 9–12.9)
POTASSIUM SERPL-SCNC: 4.1 MMOL/L (ref 3.6–5.5)
PROT SERPL-MCNC: 7 G/DL (ref 6–8.2)
RBC # BLD AUTO: 4.95 M/UL (ref 4.2–5.4)
SODIUM SERPL-SCNC: 140 MMOL/L (ref 135–145)
T4 FREE SERPL-MCNC: 0.96 NG/DL (ref 0.53–1.43)
TSH SERPL DL<=0.005 MIU/L-ACNC: 1.15 UIU/ML (ref 0.3–3.7)
VIT B12 SERPL-MCNC: 1017 PG/ML (ref 211–911)
WBC # BLD AUTO: 5.5 K/UL (ref 4.8–10.8)

## 2017-09-20 PROCEDURE — 36415 COLL VENOUS BLD VENIPUNCTURE: CPT

## 2017-09-20 PROCEDURE — 80053 COMPREHEN METABOLIC PANEL: CPT

## 2017-09-20 PROCEDURE — 82607 VITAMIN B-12: CPT

## 2017-09-20 PROCEDURE — 84439 ASSAY OF FREE THYROXINE: CPT

## 2017-09-20 PROCEDURE — 85025 COMPLETE CBC W/AUTO DIFF WBC: CPT

## 2017-09-20 PROCEDURE — 84443 ASSAY THYROID STIM HORMONE: CPT

## 2017-09-26 ENCOUNTER — APPOINTMENT (RX ONLY)
Dept: URBAN - METROPOLITAN AREA CLINIC 4 | Facility: CLINIC | Age: 72
Setting detail: DERMATOLOGY
End: 2017-09-26

## 2017-09-26 DIAGNOSIS — D49.2 NEOPLASM OF UNSPECIFIED BEHAVIOR OF BONE, SOFT TISSUE, AND SKIN: ICD-10-CM

## 2017-09-26 PROBLEM — Z85.828 PERSONAL HISTORY OF OTHER MALIGNANT NEOPLASM OF SKIN: Status: ACTIVE | Noted: 2017-09-26

## 2017-09-26 PROCEDURE — 11100: CPT

## 2017-09-26 PROCEDURE — ? BIOPSY BY PUNCH METHOD

## 2017-09-26 ASSESSMENT — LOCATION ZONE DERM: LOCATION ZONE: LEG

## 2017-09-26 ASSESSMENT — LOCATION SIMPLE DESCRIPTION DERM: LOCATION SIMPLE: RIGHT POSTERIOR THIGH

## 2017-09-26 ASSESSMENT — LOCATION DETAILED DESCRIPTION DERM: LOCATION DETAILED: RIGHT DISTAL POSTERIOR THIGH

## 2017-09-26 NOTE — HPI: SUBCUTANEOUS GROWTH
Has Your Growth Been Treated?: not been treated
Is This A New Presentation, Or A Follow-Up?: Subcutaneous Growth

## 2017-09-26 NOTE — PROCEDURE: BIOPSY BY PUNCH METHOD
Anesthesia Volume In Cc: 1
Dressing: bandage
Detail Level: Detailed
Hemostasis: None
Patient Will Remove Sutures At Home?: No
Punch Size In Mm: 3
Wound Care: Vaseline
Biopsy Type: H and E
Home Suture Removal Text: Patient was provided a home suture removal kit and will remove their sutures at home.  If they have any questions or difficulties they will call the office.
Size Of Lesion In Cm (Optional): 0
Lab: [id number]
Anesthesia Type: 1% lidocaine with epinephrine
Post-Care Instructions: I reviewed with the patient in detail post-care instructions. Patient is to keep the biopsy site dry overnight, and then apply bacitracin twice daily until healed. Patient may apply hydrogen peroxide soaks to remove any crusting.
Epidermal Sutures: none, closed by secondary intention
Notification Instructions: Patient will be notified of biopsy results. However, patient instructed to call the office if not contacted within 2 weeks.
Suture Removal: 14 days
Consent: Written consent was obtained and risks were reviewed including but not limited to scarring, infection, bleeding, scabbing, incomplete removal, nerve damage and allergy to anesthesia.
Lab Facility: 
Billing Type: Third-Party Bill

## 2017-10-13 ENCOUNTER — OFFICE VISIT (OUTPATIENT)
Dept: MEDICAL GROUP | Facility: PHYSICIAN GROUP | Age: 72
End: 2017-10-13
Payer: MEDICARE

## 2017-10-13 VITALS
SYSTOLIC BLOOD PRESSURE: 120 MMHG | HEART RATE: 82 BPM | HEIGHT: 62 IN | BODY MASS INDEX: 29.44 KG/M2 | OXYGEN SATURATION: 97 % | WEIGHT: 160 LBS | RESPIRATION RATE: 16 BRPM | DIASTOLIC BLOOD PRESSURE: 68 MMHG | TEMPERATURE: 98.6 F

## 2017-10-13 DIAGNOSIS — L72.0 CYST OF SKIN AND SUBCUTANEOUS TISSUE: ICD-10-CM

## 2017-10-13 DIAGNOSIS — E03.9 ACQUIRED HYPOTHYROIDISM: ICD-10-CM

## 2017-10-13 DIAGNOSIS — Z23 NEED FOR VACCINATION: ICD-10-CM

## 2017-10-13 DIAGNOSIS — I10 ESSENTIAL HYPERTENSION: ICD-10-CM

## 2017-10-13 DIAGNOSIS — Z79.01 CHRONIC ANTICOAGULATION: ICD-10-CM

## 2017-10-13 DIAGNOSIS — Z87.39 HISTORY OF GOUT: ICD-10-CM

## 2017-10-13 DIAGNOSIS — G62.9 PERIPHERAL POLYNEUROPATHY: ICD-10-CM

## 2017-10-13 DIAGNOSIS — N18.30 CKD (CHRONIC KIDNEY DISEASE) STAGE 3, GFR 30-59 ML/MIN (HCC): ICD-10-CM

## 2017-10-13 DIAGNOSIS — Z86.711 HISTORY OF PULMONARY EMBOLISM: ICD-10-CM

## 2017-10-13 PROCEDURE — G0008 ADMIN INFLUENZA VIRUS VAC: HCPCS | Performed by: FAMILY MEDICINE

## 2017-10-13 PROCEDURE — 99214 OFFICE O/P EST MOD 30 MIN: CPT | Mod: 25 | Performed by: FAMILY MEDICINE

## 2017-10-13 PROCEDURE — 90662 IIV NO PRSV INCREASED AG IM: CPT | Performed by: FAMILY MEDICINE

## 2017-10-13 ASSESSMENT — PAIN SCALES - GENERAL: PAINLEVEL: NO PAIN

## 2017-10-13 ASSESSMENT — PATIENT HEALTH QUESTIONNAIRE - PHQ9: CLINICAL INTERPRETATION OF PHQ2 SCORE: 0

## 2017-10-13 NOTE — ASSESSMENT & PLAN NOTE
Ángela. Diagnosed in 2013 and again in 2016 with pulmonary embolism. It was unprovoked, but there is thought that it my have been HRT related. She is on Xarelto for life. Follows with Dr. Luna.

## 2017-10-13 NOTE — ASSESSMENT & PLAN NOTE
Stable. No recent flares. She has not needed allopurinol. She requests that her uric acid level be rechecked.

## 2017-10-13 NOTE — PROGRESS NOTES
Subjective:   Sandi Peterson is a 72 y.o. female here today for cyst on right leg.    Cyst of skin and subcutaneous tissue  This is a new problem. Patient was found to have a cystic lesion on her right thigh. She had a punch biopsy that was concerning for possible squamous cell cancer. She is scheduled to have an excision on 11/2/17, but is worried about continuing her Xarelto and increased risk of bleeding. The dermatology office has told her multiple times it would be safe to continue the medication.    History of pulmonary embolism  Stable. Diagnosed in 2013 and again in 2016 with pulmonary embolism. It was unprovoked, but there is thought that it my have been HRT related. She is on Xarelto for life. Follows with Dr. Luna.    Peripheral polyneuropathy (CMS-HCC)  Stable. Has burning pain on the bottom of her feet. Takes B12 daily. We discussed her lab results.    CKD (chronic kidney disease) stage 3, GFR 30-59 ml/min  Stable. Follows with Dr. Mckeon yearly. Most recent GFR was normal and above 60. At the lowest, it was 49. No flank pain. No issues with urination.    Essential hypertension  Stable. Monitoring BP at home. Currently taking metoprolol, spironolactone as directed. Also taking baby aspirin. Denies lightheadedness, vision changes, headache, palpitations or leg swelling.    Acquired hypothyroidism  Labwork reviewed and up to date. Taking medicine as directed. Denies palpitations, skin changes, temperature intolerance, changes in bowel habits.    History of gout  Stable. No recent flares. She has not needed allopurinol. She requests that her uric acid level be rechecked.     Current medicines (including changes today)  Current Outpatient Prescriptions   Medication Sig Dispense Refill   • XARELTO 20 MG Tab tablet TAKE 1 TABLET BY MOUTH EVERY DAY 30 Tab 5   • clonazepam (KLONOPIN) 0.5 MG Tab Take 0.5 Tabs by mouth 1 time daily as needed (severe neuropathy or anxiety). 45 Tab 3   • ondansetron  "(ZOFRAN ODT) 4 MG TABLET DISPERSIBLE Take 1 Tab by mouth every 8 hours as needed for Nausea/Vomiting. AS NEEDED FOR NAUSEA AND VOMITING 30 Tab 3   • metoprolol (LOPRESSOR) 25 MG Tab Take 0.5 Tabs by mouth every day. 45 Tab 3   • spironolactone (ALDACTONE) 25 MG Tab Take 0.5 Tabs by mouth every day. 45 Tab 3   • levothyroxine (SYNTHROID) 50 MCG Tab Take 1 Tab by mouth Every morning on an empty stomach. 90 Tab 3   • Cyanocobalamin (VITAMIN B 12) 250 MCG Lozenge Take  by mouth every day.     • therapeutic multivitamin-minerals (THERAGRAN-M) Tab Take 1 Tab by mouth every day.     • Cholecalciferol (VITAMIN D) 2000 UNITS Cap Take 1,000 Units by mouth every day at 6 PM.     • MELATONIN PO Take  by mouth.     • acetaminophen (TYLENOL) 325 MG TABS Take 650 mg by mouth every four hours as needed. Indications: Pain     • famotidine (PEPCID) 20 MG TABS Take 10 mg by mouth 2 times a day as needed. For GERD       No current facility-administered medications for this visit.      She  has a past medical history of Allergic rhinitis; Anxiety; Arthritis; Back pain; DVT (deep venous thrombosis) (CMS-[facility]); GERD (gastroesophageal reflux disease); Gout; Hiatal hernia; Hyperlipidemia; Hypertension; Hypoglycemia; Hypothyroid; Hypothyroidism; PE (pulmonary embolism); and Pneumonia.    ROS   See HPI. No chest pain, no shortness of breath, no abdominal pain.     Objective:     Physical Exam:  Blood pressure 120/68, pulse 82, temperature 37 °C (98.6 °F), resp. rate 16, height 1.575 m (5' 2\"), weight 72.6 kg (160 lb), SpO2 97 %, not currently breastfeeding. Body mass index is 29.26 kg/m².   Constitutional: Alert, no distress.  Skin: Warm, dry, good turgor, no rashes in visible areas.  Eye: Equal, round and reactive, conjunctiva clear, lids normal.  ENMT: Lips without lesions, good dentition, oropharynx clear.  Neck: Trachea midline, no masses, no thyromegaly.  Respiratory: Unlabored respiratory effort, no cough.  Psych: Alert and oriented x3, " anxious.    Assessment and Plan:     1. Cyst of skin and subcutaneous tissue  Reviewed dermatopathology report with patient. I will contact her dermatology office to confirm instructions regarding Xarelto.    2. History of pulmonary embolism  3. Chronic anticoagulation  Chronic and stable without recent reoccurrence. Following with pulmonology. Continue Xarelto and monitor.    4. Peripheral polyneuropathy (CMS-HCC)  Chronic and stable. Continue current medications and monitor.  - VITAMIN B12; Future    5. CKD (chronic kidney disease) stage 3, GFR 30-59 ml/min  Chronic and stable. Following with nephrology. Labwork ordered. Avoid nephrotoxic medications.  - COMP METABOLIC PANEL; Future    6. Essential hypertension  Well-controlled. Labs as indicated. Continue antihypertensive medications. Discussed decreasing salt intake. Emphasized benefits of exercise and diet. Continue to monitor.  - COMP METABOLIC PANEL; Future    7. Acquired hypothyroidism  Continue thyroid medication. Instructed patient to take on empty stomach with glass of water, 30 minutes prior to food or other medications. Labs as indicated.  - TSH; Future    8. History of gout  Stable. No recent exacerbations. Check uric acid level.  - URIC ACID; Future    9. Need for vaccination  Lengthy discussion with patient regarding influenza vaccination. She reports adverse effects of headache, low-grade fever and muscle aches with quadrivalent shot. We only have the quadrivalent here. I offered to write her a prescription to see if a local pharmacy has the trivalent in stock. She agreed to receive the high-dose quadrivalent in our office today. We discussed possible adverse reactions and how to treat them supportively at home.  - INFLUENZA VACCINE, HIGH DOSE (65+ ONLY)    Followup: Return in about 7 months (around 5/13/2018) for routine follow-up, specialists update, short.         PLEASE NOTE: This dictation was created using voice recognition software. I have  made every reasonable attempt to correct obvious errors, but I expect that there are errors of grammar and possibly content that I did not discover before finalizing the note.

## 2017-10-13 NOTE — ASSESSMENT & PLAN NOTE
Stable. Follows with Dr. Mckeon yearly. Most recent GFR was normal and above 60. At the lowest, it was 49. No flank pain. No issues with urination.

## 2017-10-13 NOTE — ASSESSMENT & PLAN NOTE
Stable. Has burning pain on the bottom of her feet. Takes B12 daily. We discussed her lab results.

## 2017-10-13 NOTE — ASSESSMENT & PLAN NOTE
This is a new problem. Patient was found to have a cystic lesion on her right thigh. She had a punch biopsy that was concerning for possible squamous cell cancer. She is scheduled to have an excision on 11/2/17, but is worried about continuing her Xarelto and increased risk of bleeding. The dermatology office has told her multiple times it would be safe to continue the medication.

## 2017-10-17 ENCOUNTER — TELEPHONE (OUTPATIENT)
Dept: MEDICAL GROUP | Facility: PHYSICIAN GROUP | Age: 72
End: 2017-10-17

## 2017-10-17 NOTE — TELEPHONE ENCOUNTER
Patient has upcoming cyst removal on 11/2/17 with Dr. Alka Marmolejo at [facility]. She is on Xarelto for history of multiple blood clots. She had been told she does not need to stop the medication for the procedure. I had called and left a message with the office on Friday to confirm this. Please call their office to see if patient may continue her Xarelto or if she needs to stop it. Thank you.  Maricruz Phelan M.D.

## 2017-10-17 NOTE — TELEPHONE ENCOUNTER
Thank you. Please let patient know we confirmed with dermatology and it is safe for her to continue Xarelto.  Maricruz Phelan M.D.

## 2017-10-17 NOTE — TELEPHONE ENCOUNTER
Tia states it is ok for patient to continue taking xarelto.  Tia states she LVM on 's cell as well.

## 2017-10-17 NOTE — TELEPHONE ENCOUNTER
Patient states that she received her flu shot on Friday and she did not have a reaction this time.  FYI to PCP

## 2017-10-17 NOTE — TELEPHONE ENCOUNTER
Phone Number Called: [facility] - [facility] - Medical Assistant      Message: LISAM for Tia to call me back to clarify if xarelto needs to be stopped prior to procedure.     Left Message for patient to call back: yes

## 2017-11-02 ENCOUNTER — RX ONLY (OUTPATIENT)
Age: 72
Setting detail: RX ONLY
End: 2017-11-02

## 2017-11-02 ENCOUNTER — APPOINTMENT (RX ONLY)
Dept: URBAN - METROPOLITAN AREA CLINIC 36 | Facility: CLINIC | Age: 72
Setting detail: DERMATOLOGY
End: 2017-11-02

## 2017-11-02 DIAGNOSIS — D485 NEOPLASM OF UNCERTAIN BEHAVIOR OF SKIN: ICD-10-CM

## 2017-11-02 PROBLEM — I10 ESSENTIAL (PRIMARY) HYPERTENSION: Status: ACTIVE | Noted: 2017-11-02

## 2017-11-02 PROBLEM — E03.9 HYPOTHYROIDISM, UNSPECIFIED: Status: ACTIVE | Noted: 2017-11-02

## 2017-11-02 PROBLEM — D48.5 NEOPLASM OF UNCERTAIN BEHAVIOR OF SKIN: Status: ACTIVE | Noted: 2017-11-02

## 2017-11-02 PROCEDURE — 12032 INTMD RPR S/A/T/EXT 2.6-7.5: CPT

## 2017-11-02 PROCEDURE — ? EXCISION

## 2017-11-02 PROCEDURE — 11402 EXC TR-EXT B9+MARG 1.1-2 CM: CPT

## 2017-11-02 RX ORDER — AMOXICILLIN 500 MG/1
CAPSULE ORAL
Qty: 21 | Refills: 0 | Status: ERX | COMMUNITY
Start: 2017-11-02

## 2017-11-02 ASSESSMENT — LOCATION ZONE DERM: LOCATION ZONE: LEG

## 2017-11-02 ASSESSMENT — LOCATION SIMPLE DESCRIPTION DERM: LOCATION SIMPLE: RIGHT POSTERIOR THIGH

## 2017-11-02 ASSESSMENT — LOCATION DETAILED DESCRIPTION DERM: LOCATION DETAILED: RIGHT PROXIMAL POSTERIOR THIGH

## 2017-11-02 NOTE — PROCEDURE: EXCISION
Advancement Flap (Double) Text: The defect edges were debeveled with a #15 scalpel blade.  Given the location of the defect and the proximity to free margins a double advancement flap was deemed most appropriate.  Using a sterile surgical marker, the appropriate advancement flaps were drawn incorporating the defect and placing the expected incisions within the relaxed skin tension lines where possible.    The area thus outlined was incised deep to adipose tissue with a #15 scalpel blade.  The skin margins were undermined to an appropriate distance in all directions utilizing iris scissors.
Show Primary And Secondary Defect Sizes Variable (Do Not Hide If You Perform Flaps Or Graft Closures): Yes
Hatchet Flap Text: The defect edges were debeveled with a #15 scalpel blade.  Given the location of the defect, shape of the defect and the proximity to free margins a hatchet flap was deemed most appropriate.  Using a sterile surgical marker, an appropriate hatchet flap was drawn incorporating the defect and placing the expected incisions within the relaxed skin tension lines where possible.    The area thus outlined was incised deep to adipose tissue with a #15 scalpel blade.  The skin margins were undermined to an appropriate distance in all directions utilizing iris scissors.
Purse String (Intermediate) Text: Given the location of the defect and the characteristics of the surrounding skin a purse string intermediate closure was deemed most appropriate.  Undermining was performed circumferentially around the surgical defect.  A purse string suture was then placed and tightened.
Epidermal Sutures: 4-0 Ethilon
Complex Repair And Double Advancement Flap Text: The defect edges were debeveled with a #15 scalpel blade.  The primary defect was closed partially with a complex linear closure.  Given the location of the remaining defect, shape of the defect and the proximity to free margins a double advancement flap was deemed most appropriate for complete closure of the defect.  Using a sterile surgical marker, an appropriate advancement flap was drawn incorporating the defect and placing the expected incisions within the relaxed skin tension lines where possible.    The area thus outlined was incised deep to adipose tissue with a #15 scalpel blade.  The skin margins were undermined to an appropriate distance in all directions utilizing iris scissors.
Size Of Lesion In Cm: 1.1
Complex Repair And W Plasty Text: The defect edges were debeveled with a #15 scalpel blade.  The primary defect was closed partially with a complex linear closure.  Given the location of the remaining defect, shape of the defect and the proximity to free margins a W plasty was deemed most appropriate for complete closure of the defect.  Using a sterile surgical marker, an appropriate advancement flap was drawn incorporating the defect and placing the expected incisions within the relaxed skin tension lines where possible.    The area thus outlined was incised deep to adipose tissue with a #15 scalpel blade.  The skin margins were undermined to an appropriate distance in all directions utilizing iris scissors.
Muscle Hinge Flap Text: The defect edges were debeveled with a #15 scalpel blade.  Given the size, depth and location of the defect and the proximity to free margins a muscle hinge flap was deemed most appropriate.  Using a sterile surgical marker, an appropriate hinge flap was drawn incorporating the defect. The area thus outlined was incised with a #15 scalpel blade.  The skin margins were undermined to an appropriate distance in all directions utilizing iris scissors.
Complex Repair And Melolabial Flap Text: The defect edges were debeveled with a #15 scalpel blade.  The primary defect was closed partially with a complex linear closure.  Given the location of the remaining defect, shape of the defect and the proximity to free margins a melolabial flap was deemed most appropriate for complete closure of the defect.  Using a sterile surgical marker, an appropriate advancement flap was drawn incorporating the defect and placing the expected incisions within the relaxed skin tension lines where possible.    The area thus outlined was incised deep to adipose tissue with a #15 scalpel blade.  The skin margins were undermined to an appropriate distance in all directions utilizing iris scissors.
Helical Rim Advancement Flap Text: The defect edges were debeveled with a #15 blade scalpel.  Given the location of the defect and the proximity to free margins (helical rim) a double helical rim advancement flap was deemed most appropriate.  Using a sterile surgical marker, the appropriate advancement flaps were drawn incorporating the defect and placing the expected incisions between the helical rim and antihelix where possible.  The area thus outlined was incised through and through with a #15 scalpel blade.  With a skin hook and iris scissors, the flaps were gently and sharply undermined and freed up.
Intermediate Repair Preamble Text (Leave Blank If You Do Not Want): Undermining was performed with blunt dissection.
Interpolation Flap Text: A decision was made to reconstruct the defect utilizing an interpolation axial flap and a staged reconstruction.  A telfa template was made of the defect.  This telfa template was then used to outline the interpolation flap.  The donor area for the pedicle flap was then injected with anesthesia.  The flap was excised through the skin and subcutaneous tissue down to the layer of the underlying musculature.  The interpolation flap was carefully excised within this deep plane to maintain its blood supply.  The edges of the donor site were undermined.   The donor site was closed in a primary fashion.  The pedicle was then rotated into position and sutured.  Once the tube was sutured into place, adequate blood supply was confirmed with blanching and refill.  The pedicle was then wrapped with xeroform gauze and dressed appropriately with a telfa and gauze bandage to ensure continued blood supply and protect the attached pedicle.
Complex Repair And Dermal Autograft Text: The defect edges were debeveled with a #15 scalpel blade.  The primary defect was closed partially with a complex linear closure.  Given the location of the defect, shape of the defect and the proximity to free margins an dermal autograft was deemed most appropriate to repair the remaining defect.  The graft was trimmed to fit the size of the remaining defect.  The graft was then placed in the primary defect, oriented appropriately, and sutured into place.
Include Z78.9 (Other Specified Conditions Influencing Health Status) As An Associated Diagnosis?: No
Additional Anesthesia Volume In Cc: 6
Secondary Defect Length (In Cm): 0
Ftsg Text: The defect edges were debeveled with a #15 scalpel blade.  Given the location of the defect, shape of the defect and the proximity to free margins a full thickness skin graft was deemed most appropriate.  Using a sterile surgical marker, the primary defect shape was transferred to the donor site. The area thus outlined was incised deep to adipose tissue with a #15 scalpel blade.  The harvested graft was then trimmed of adipose tissue until only dermis and epidermis was left.  The skin margins of the secondary defect were undermined to an appropriate distance in all directions utilizing iris scissors.  The secondary defect was closed with interrupted buried subcutaneous sutures.  The skin edges were then re-apposed with running  sutures.  The skin graft was then placed in the primary defect and oriented appropriately.
O-Z Plasty Text: The defect edges were debeveled with a #15 scalpel blade.  Given the location of the defect, shape of the defect and the proximity to free margins an O-Z plasty (double transposition flap) was deemed most appropriate.  Using a sterile surgical marker, the appropriate transposition flaps were drawn incorporating the defect and placing the expected incisions within the relaxed skin tension lines where possible.    The area thus outlined was incised deep to adipose tissue with a #15 scalpel blade.  The skin margins were undermined to an appropriate distance in all directions utilizing iris scissors.  Hemostasis was achieved with electrocautery.  The flaps were then transposed into place, one clockwise and the other counterclockwise, and anchored with interrupted buried subcutaneous sutures.
Excision Depth: adipose tissue
Anesthesia Type: 1% lidocaine with epinephrine
W Plasty Text: The lesion was extirpated to the level of the fat with a #15 scalpel blade.  Given the location of the defect, shape of the defect and the proximity to free margins a W-plasty was deemed most appropriate for repair.  Using a sterile surgical marker, the appropriate transposition arms of the W-plasty were drawn incorporating the defect and placing the expected incisions within the relaxed skin tension lines where possible.    The area thus outlined was incised deep to adipose tissue with a #15 scalpel blade.  The skin margins were undermined to an appropriate distance in all directions utilizing iris scissors.  The opposing transposition arms were then transposed into place in opposite direction and anchored with interrupted buried subcutaneous sutures.
Crescentic Advancement Flap Text: The defect edges were debeveled with a #15 scalpel blade.  Given the location of the defect and the proximity to free margins a crescentic advancement flap was deemed most appropriate.  Using a sterile surgical marker, the appropriate advancement flap was drawn incorporating the defect and placing the expected incisions within the relaxed skin tension lines where possible.    The area thus outlined was incised deep to adipose tissue with a #15 scalpel blade.  The skin margins were undermined to an appropriate distance in all directions utilizing iris scissors.
Suture Removal: 9 days
Xenograft Text: The defect edges were debeveled with a #15 scalpel blade.  Given the location of the defect, shape of the defect and the proximity to free margins a xenograft was deemed most appropriate.  The graft was then trimmed to fit the size of the defect.  The graft was then placed in the primary defect and oriented appropriately.
Advancement Flap (Single) Text: The defect edges were debeveled with a #15 scalpel blade.  Given the location of the defect and the proximity to free margins a single advancement flap was deemed most appropriate.  Using a sterile surgical marker, an appropriate advancement flap was drawn incorporating the defect and placing the expected incisions within the relaxed skin tension lines where possible.    The area thus outlined was incised deep to adipose tissue with a #15 scalpel blade.  The skin margins were undermined to an appropriate distance in all directions utilizing iris scissors.
Alar Island Pedicle Flap Text: The defect edges were debeveled with a #15 scalpel blade.  Given the location of the defect, shape of the defect and the proximity to the alar rim an island pedicle advancement flap was deemed most appropriate.  Using a sterile surgical marker, an appropriate advancement flap was drawn incorporating the defect, outlining the appropriate donor tissue and placing the expected incisions within the nasal ala running parallel to the alar rim. The area thus outlined was incised with a #15 scalpel blade.  The skin margins were undermined minimally to an appropriate distance in all directions around the primary defect and laterally outward around the island pedicle utilizing iris scissors.  There was minimal undermining beneath the pedicle flap.
Complex Repair And Double M Plasty Text: The defect edges were debeveled with a #15 scalpel blade.  The primary defect was closed partially with a complex linear closure.  Given the location of the remaining defect, shape of the defect and the proximity to free margins a double M plasty was deemed most appropriate for complete closure of the defect.  Using a sterile surgical marker, an appropriate advancement flap was drawn incorporating the defect and placing the expected incisions within the relaxed skin tension lines where possible.    The area thus outlined was incised deep to adipose tissue with a #15 scalpel blade.  The skin margins were undermined to an appropriate distance in all directions utilizing iris scissors.
Complex Repair And Bilobe Flap Text: The defect edges were debeveled with a #15 scalpel blade.  The primary defect was closed partially with a complex linear closure.  Given the location of the remaining defect, shape of the defect and the proximity to free margins a bilobe flap was deemed most appropriate for complete closure of the defect.  Using a sterile surgical marker, an appropriate advancement flap was drawn incorporating the defect and placing the expected incisions within the relaxed skin tension lines where possible.    The area thus outlined was incised deep to adipose tissue with a #15 scalpel blade.  The skin margins were undermined to an appropriate distance in all directions utilizing iris scissors.
Complex Repair And Z Plasty Text: The defect edges were debeveled with a #15 scalpel blade.  The primary defect was closed partially with a complex linear closure.  Given the location of the remaining defect, shape of the defect and the proximity to free margins a Z plasty was deemed most appropriate for complete closure of the defect.  Using a sterile surgical marker, an appropriate advancement flap was drawn incorporating the defect and placing the expected incisions within the relaxed skin tension lines where possible.    The area thus outlined was incised deep to adipose tissue with a #15 scalpel blade.  The skin margins were undermined to an appropriate distance in all directions utilizing iris scissors.
Complex Repair And Modified Advancement Flap Text: The defect edges were debeveled with a #15 scalpel blade.  The primary defect was closed partially with a complex linear closure.  Given the location of the remaining defect, shape of the defect and the proximity to free margins a modified advancement flap was deemed most appropriate for complete closure of the defect.  Using a sterile surgical marker, an appropriate advancement flap was drawn incorporating the defect and placing the expected incisions within the relaxed skin tension lines where possible.    The area thus outlined was incised deep to adipose tissue with a #15 scalpel blade.  The skin margins were undermined to an appropriate distance in all directions utilizing iris scissors.
Lip Wedge Excision Repair Text: Given the location of the defect and the proximity to free margins a full thickness wedge repair was deemed most appropriate.  Using a sterile surgical marker, the appropriate repair was drawn incorporating the defect and placing the expected incisions perpendicular to the vermilion border.  The vermilion border was also meticulously outlined to ensure appropriate reapproximation during the repair.  The area thus outlined was incised through and through with a #15 scalpel blade.  The muscularis and dermis were reaproximated with deep sutures following hemostasis. Care was taken to realign the vermilion border before proceeding with the superficial closure.  Once the vermilion was realigned the superfical and mucosal closure was finished.
Skin Substitute Text: The defect edges were debeveled with a #15 scalpel blade.  Given the location of the defect, shape of the defect and the proximity to free margins a skin substitute graft was deemed most appropriate.  The graft material was trimmed to fit the size of the defect. The graft was then placed in the primary defect and oriented appropriately.
S Plasty Text: Given the location and shape of the defect, and the orientation of relaxed skin tension lines, an S-plasty was deemed most appropriate for repair.  Using a sterile surgical marker, the appropriate outline of the S-plasty was drawn, incorporating the defect and placing the expected incisions within the relaxed skin tension lines where possible.  The area thus outlined was incised deep to adipose tissue with a #15 scalpel blade.  The skin margins were undermined to an appropriate distance in all directions utilizing iris scissors. The skin flaps were advanced over the defect.  The opposing margins were then approximated with interrupted buried subcutaneous sutures.
Complex Repair And M Plasty Text: The defect edges were debeveled with a #15 scalpel blade.  The primary defect was closed partially with a complex linear closure.  Given the location of the remaining defect, shape of the defect and the proximity to free margins an M plasty was deemed most appropriate for complete closure of the defect.  Using a sterile surgical marker, an appropriate advancement flap was drawn incorporating the defect and placing the expected incisions within the relaxed skin tension lines where possible.    The area thus outlined was incised deep to adipose tissue with a #15 scalpel blade.  The skin margins were undermined to an appropriate distance in all directions utilizing iris scissors.
No Repair - Repaired With Adjacent Surgical Defect Text (Leave Blank If You Do Not Want): After the excision the defect was repaired concurrently with another surgical defect which was in close approximation.
Perilesional Excision Additional Text (Leave Blank If You Do Not Want): The margin was drawn around the clinically apparent lesion. Incisions were then made along these lines to the appropriate tissue plane and the lesion was extirpated.
Epidermal Closure: simple interrupted
Scalpel Size: 15 blade
H Plasty Text: Given the location of the defect, shape of the defect and the proximity to free margins a H-plasty was deemed most appropriate for repair.  Using a sterile surgical marker, the appropriate advancement arms of the H-plasty were drawn incorporating the defect and placing the expected incisions within the relaxed skin tension lines where possible. The area thus outlined was incised deep to adipose tissue with a #15 scalpel blade. The skin margins were undermined to an appropriate distance in all directions utilizing iris scissors.  The opposing advancement arms were then advanced into place in opposite direction and anchored with interrupted buried subcutaneous sutures.
Mastoid Interpolation Flap Text: A decision was made to reconstruct the defect utilizing an interpolation axial flap and a staged reconstruction.  A telfa template was made of the defect.  This telfa template was then used to outline the mastoid interpolation flap.  The donor area for the pedicle flap was then injected with anesthesia.  The flap was excised through the skin and subcutaneous tissue down to the layer of the underlying musculature.  The pedicle flap was carefully excised within this deep plane to maintain its blood supply.  The edges of the donor site were undermined.   The donor site was closed in a primary fashion.  The pedicle was then rotated into position and sutured.  Once the tube was sutured into place, adequate blood supply was confirmed with blanching and refill.  The pedicle was then wrapped with xeroform gauze and dressed appropriately with a telfa and gauze bandage to ensure continued blood supply and protect the attached pedicle.
Dermal Autograft Text: The defect edges were debeveled with a #15 scalpel blade.  Given the location of the defect, shape of the defect and the proximity to free margins a dermal autograft was deemed most appropriate.  Using a sterile surgical marker, the primary defect shape was transferred to the donor site. The area thus outlined was incised deep to adipose tissue with a #15 scalpel blade.  The harvested graft was then trimmed of adipose and epidermal tissue until only dermis was left.  The skin graft was then placed in the primary defect and oriented appropriately.
Medical Necessity Information: It is in your best interest to select a reason for this procedure from the list below. All of these items fulfill various CMS LCD requirements except lesion extends to a margin.
Path Notes (To The Dermatopathologist): Please check margins.
Complex Repair Preamble Text (Leave Blank If You Do Not Want): Extensive wide undermining was performed.
Burow's Advancement Flap Text: The defect edges were debeveled with a #15 scalpel blade.  Given the location of the defect and the proximity to free margins a Burow's advancement flap was deemed most appropriate.  Using a sterile surgical marker, the appropriate advancement flap was drawn incorporating the defect and placing the expected incisions within the relaxed skin tension lines where possible.    The area thus outlined was incised deep to adipose tissue with a #15 scalpel blade.  The skin margins were undermined to an appropriate distance in all directions utilizing iris scissors.
Melolabial Interpolation Flap Text: A decision was made to reconstruct the defect utilizing an interpolation axial flap and a staged reconstruction.  A telfa template was made of the defect.  This telfa template was then used to outline the melolabial interpolation flap.  The donor area for the pedicle flap was then injected with anesthesia.  The flap was excised through the skin and subcutaneous tissue down to the layer of the underlying musculature.  The pedicle flap was carefully excised within this deep plane to maintain its blood supply.  The edges of the donor site were undermined.   The donor site was closed in a primary fashion.  The pedicle was then rotated into position and sutured.  Once the tube was sutured into place, adequate blood supply was confirmed with blanching and refill.  The pedicle was then wrapped with xeroform gauze and dressed appropriately with a telfa and gauze bandage to ensure continued blood supply and protect the attached pedicle.
Rhombic Flap Text: The defect edges were debeveled with a #15 scalpel blade.  Given the location of the defect and the proximity to free margins a rhombic flap was deemed most appropriate.  Using a sterile surgical marker, an appropriate rhombic flap was drawn incorporating the defect.    The area thus outlined was incised deep to adipose tissue with a #15 scalpel blade.  The skin margins were undermined to an appropriate distance in all directions utilizing iris scissors.
Dorsal Nasal Flap Text: The defect edges were debeveled with a #15 scalpel blade.  Given the location of the defect and the proximity to free margins a dorsal nasal flap was deemed most appropriate.  Using a sterile surgical marker, an appropriate dorsal nasal flap was drawn around the defect.    The area thus outlined was incised deep to adipose tissue with a #15 scalpel blade.  The skin margins were undermined to an appropriate distance in all directions utilizing iris scissors.
Island Pedicle Flap-Requiring Vessel Identification Text: The defect edges were debeveled with a #15 scalpel blade.  Given the location of the defect, shape of the defect and the proximity to free margins an island pedicle advancement flap was deemed most appropriate.  Using a sterile surgical marker, an appropriate advancement flap was drawn, based on the axial vessel mentioned above, incorporating the defect, outlining the appropriate donor tissue and placing the expected incisions within the relaxed skin tension lines where possible.    The area thus outlined was incised deep to adipose tissue with a #15 scalpel blade.  The skin margins were undermined to an appropriate distance in all directions around the primary defect and laterally outward around the island pedicle utilizing iris scissors.  There was minimal undermining beneath the pedicle flap.
Melolabial Transposition Flap Text: The defect edges were debeveled with a #15 scalpel blade.  Given the location of the defect and the proximity to free margins a melolabial flap was deemed most appropriate.  Using a sterile surgical marker, an appropriate melolabial transposition flap was drawn incorporating the defect.    The area thus outlined was incised deep to adipose tissue with a #15 scalpel blade.  The skin margins were undermined to an appropriate distance in all directions utilizing iris scissors.
Complex Repair And Rotation Flap Text: The defect edges were debeveled with a #15 scalpel blade.  The primary defect was closed partially with a complex linear closure.  Given the location of the remaining defect, shape of the defect and the proximity to free margins a rotation flap was deemed most appropriate for complete closure of the defect.  Using a sterile surgical marker, an appropriate advancement flap was drawn incorporating the defect and placing the expected incisions within the relaxed skin tension lines where possible.    The area thus outlined was incised deep to adipose tissue with a #15 scalpel blade.  The skin margins were undermined to an appropriate distance in all directions utilizing iris scissors.
Split-Thickness Skin Graft Text: The defect edges were debeveled with a #15 scalpel blade.  Given the location of the defect, shape of the defect and the proximity to free margins a split thickness skin graft was deemed most appropriate.  Using a sterile surgical marker, the primary defect shape was transferred to the donor site. The split thickness graft was then harvested.  The skin graft was then placed in the primary defect and oriented appropriately.
V-Y Plasty Text: The defect edges were debeveled with a #15 scalpel blade.  Given the location of the defect, shape of the defect and the proximity to free margins an V-Y advancement flap was deemed most appropriate.  Using a sterile surgical marker, an appropriate advancement flap was drawn incorporating the defect and placing the expected incisions within the relaxed skin tension lines where possible.    The area thus outlined was incised deep to adipose tissue with a #15 scalpel blade.  The skin margins were undermined to an appropriate distance in all directions utilizing iris scissors.
Home Suture Removal Text: Patient was provided a home suture removal kit and will remove their sutures at home.  If they have any questions or difficulties they will call the office.
Rotation Flap Text: The defect edges were debeveled with a #15 scalpel blade.  Given the location of the defect, shape of the defect and the proximity to free margins a rotation flap was deemed most appropriate.  Using a sterile surgical marker, an appropriate rotation flap was drawn incorporating the defect and placing the expected incisions within the relaxed skin tension lines where possible.    The area thus outlined was incised deep to adipose tissue with a #15 scalpel blade.  The skin margins were undermined to an appropriate distance in all directions utilizing iris scissors.
Saucerization Excision Additional Text (Leave Blank If You Do Not Want): The margin was drawn around the clinically apparent lesion.  Incisions were then made along these lines, in a tangential fashion, to the appropriate tissue plane and the lesion was extirpated.
Repair Type: Intermediate
Bi-Rhombic Flap Text: The defect edges were debeveled with a #15 scalpel blade.  Given the location of the defect and the proximity to free margins a bi-rhombic flap was deemed most appropriate.  Using a sterile surgical marker, an appropriate rhombic flap was drawn incorporating the defect. The area thus outlined was incised deep to adipose tissue with a #15 scalpel blade.  The skin margins were undermined to an appropriate distance in all directions utilizing iris scissors.
Complex Repair And Tissue Cultured Epidermal Autograft Text: The defect edges were debeveled with a #15 scalpel blade.  The primary defect was closed partially with a complex linear closure.  Given the location of the defect, shape of the defect and the proximity to free margins an tissue cultured epidermal autograft was deemed most appropriate to repair the remaining defect.  The graft was trimmed to fit the size of the remaining defect.  The graft was then placed in the primary defect, oriented appropriately, and sutured into place.
Surgeon Performing Repair (Optional): Vaishnavi Young
Complex Repair And Dorsal Nasal Flap Text: The defect edges were debeveled with a #15 scalpel blade.  The primary defect was closed partially with a complex linear closure.  Given the location of the remaining defect, shape of the defect and the proximity to free margins a dorsal nasal flap was deemed most appropriate for complete closure of the defect.  Using a sterile surgical marker, an appropriate flap was drawn incorporating the defect and placing the expected incisions within the relaxed skin tension lines where possible.    The area thus outlined was incised deep to adipose tissue with a #15 scalpel blade.  The skin margins were undermined to an appropriate distance in all directions utilizing iris scissors.
Complex Repair And A-T Advancement Flap Text: The defect edges were debeveled with a #15 scalpel blade.  The primary defect was closed partially with a complex linear closure.  Given the location of the remaining defect, shape of the defect and the proximity to free margins an A-T advancement flap was deemed most appropriate for complete closure of the defect.  Using a sterile surgical marker, an appropriate advancement flap was drawn incorporating the defect and placing the expected incisions within the relaxed skin tension lines where possible.    The area thus outlined was incised deep to adipose tissue with a #15 scalpel blade.  The skin margins were undermined to an appropriate distance in all directions utilizing iris scissors.
Posterior Auricular Interpolation Flap Text: A decision was made to reconstruct the defect utilizing an interpolation axial flap and a staged reconstruction.  A telfa template was made of the defect.  This telfa template was then used to outline the posterior auricular interpolation flap.  The donor area for the pedicle flap was then injected with anesthesia.  The flap was excised through the skin and subcutaneous tissue down to the layer of the underlying musculature.  The pedicle flap was carefully excised within this deep plane to maintain its blood supply.  The edges of the donor site were undermined.   The donor site was closed in a primary fashion.  The pedicle was then rotated into position and sutured.  Once the tube was sutured into place, adequate blood supply was confirmed with blanching and refill.  The pedicle was then wrapped with xeroform gauze and dressed appropriately with a telfa and gauze bandage to ensure continued blood supply and protect the attached pedicle.
Medical Necessity Clause: This procedure was medically necessary because the lesion that was treated was:
Tissue Cultured Epidermal Autograft Text: The defect edges were debeveled with a #15 scalpel blade.  Given the location of the defect, shape of the defect and the proximity to free margins a tissue cultured epidermal autograft was deemed most appropriate.  The graft was then trimmed to fit the size of the defect.  The graft was then placed in the primary defect and oriented appropriately.
Double Island Pedicle Flap Text: The defect edges were debeveled with a #15 scalpel blade.  Given the location of the defect, shape of the defect and the proximity to free margins a double island pedicle advancement flap was deemed most appropriate.  Using a sterile surgical marker, an appropriate advancement flap was drawn incorporating the defect, outlining the appropriate donor tissue and placing the expected incisions within the relaxed skin tension lines where possible.    The area thus outlined was incised deep to adipose tissue with a #15 scalpel blade.  The skin margins were undermined to an appropriate distance in all directions around the primary defect and laterally outward around the island pedicle utilizing iris scissors.  There was minimal undermining beneath the pedicle flap.
Composite Graft Text: The defect edges were debeveled with a #15 scalpel blade.  Given the location of the defect, shape of the defect, the proximity to free margins and the fact the defect was full thickness a composite graft was deemed most appropriate.  The defect was outline and then transferred to the donor site.  A full thickness graft was then excised from the donor site. The graft was then placed in the primary defect, oriented appropriately and then sutured into place.  The secondary defect was then repaired using a primary closure.
O-T Plasty Text: The defect edges were debeveled with a #15 scalpel blade.  Given the location of the defect, shape of the defect and the proximity to free margins an O-T plasty was deemed most appropriate.  Using a sterile surgical marker, an appropriate O-T plasty was drawn incorporating the defect and placing the expected incisions within the relaxed skin tension lines where possible.    The area thus outlined was incised deep to adipose tissue with a #15 scalpel blade.  The skin margins were undermined to an appropriate distance in all directions utilizing iris scissors.
Trilobed Flap Text: The defect edges were debeveled with a #15 scalpel blade.  Given the location of the defect and the proximity to free margins a trilobed flap was deemed most appropriate.  Using a sterile surgical marker, an appropriate trilobed flap drawn around the defect.    The area thus outlined was incised deep to adipose tissue with a #15 scalpel blade.  The skin margins were undermined to an appropriate distance in all directions utilizing iris scissors.
Hemostasis: Electrocautery
V-Y Flap Text: The defect edges were debeveled with a #15 scalpel blade.  Given the location of the defect, shape of the defect and the proximity to free margins a V-Y flap was deemed most appropriate.  Using a sterile surgical marker, an appropriate advancement flap was drawn incorporating the defect and placing the expected incisions within the relaxed skin tension lines where possible.    The area thus outlined was incised deep to adipose tissue with a #15 scalpel blade.  The skin margins were undermined to an appropriate distance in all directions utilizing iris scissors.
Estimated Blood Loss (Cc): minimal
Complex Repair And Split-Thickness Skin Graft Text: The defect edges were debeveled with a #15 scalpel blade.  The primary defect was closed partially with a complex linear closure.  Given the location of the defect, shape of the defect and the proximity to free margins a split thickness skin graft was deemed most appropriate to repair the remaining defect.  The graft was trimmed to fit the size of the remaining defect.  The graft was then placed in the primary defect, oriented appropriately, and sutured into place.
Anesthesia Volume In Cc: 4
Dressing: dry sterile dressing
Complex Repair And Single Advancement Flap Text: The defect edges were debeveled with a #15 scalpel blade.  The primary defect was closed partially with a complex linear closure.  Given the location of the remaining defect, shape of the defect and the proximity to free margins a single advancement flap was deemed most appropriate for complete closure of the defect.  Using a sterile surgical marker, an appropriate advancement flap was drawn incorporating the defect and placing the expected incisions within the relaxed skin tension lines where possible.    The area thus outlined was incised deep to adipose tissue with a #15 scalpel blade.  The skin margins were undermined to an appropriate distance in all directions utilizing iris scissors.
Complex Repair And Xenograft Text: The defect edges were debeveled with a #15 scalpel blade.  The primary defect was closed partially with a complex linear closure.  Given the location of the defect, shape of the defect and the proximity to free margins a xenograft was deemed most appropriate to repair the remaining defect.  The graft was trimmed to fit the size of the remaining defect.  The graft was then placed in the primary defect, oriented appropriately, and sutured into place.
Complex Repair And Skin Substitute Graft Text: The defect edges were debeveled with a #15 scalpel blade.  The primary defect was closed partially with a complex linear closure.  Given the location of the remaining defect, shape of the defect and the proximity to free margins a skin substitute graft was deemed most appropriate to repair the remaining defect.  The graft was trimmed to fit the size of the remaining defect.  The graft was then placed in the primary defect, oriented appropriately, and sutured into place.
Size Of Margin In Cm: 0.3
Island Pedicle Flap Text: The defect edges were debeveled with a #15 scalpel blade.  Given the location of the defect, shape of the defect and the proximity to free margins an island pedicle advancement flap was deemed most appropriate.  Using a sterile surgical marker, an appropriate advancement flap was drawn incorporating the defect, outlining the appropriate donor tissue and placing the expected incisions within the relaxed skin tension lines where possible.    The area thus outlined was incised deep to adipose tissue with a #15 scalpel blade.  The skin margins were undermined to an appropriate distance in all directions around the primary defect and laterally outward around the island pedicle utilizing iris scissors.  There was minimal undermining beneath the pedicle flap.
Star Wedge Flap Text: The defect edges were debeveled with a #15 scalpel blade.  Given the location of the defect, shape of the defect and the proximity to free margins a star wedge flap was deemed most appropriate.  Using a sterile surgical marker, an appropriate rotation flap was drawn incorporating the defect and placing the expected incisions within the relaxed skin tension lines where possible. The area thus outlined was incised deep to adipose tissue with a #15 scalpel blade.  The skin margins were undermined to an appropriate distance in all directions utilizing iris scissors.
Complex Repair And Rhombic Flap Text: The defect edges were debeveled with a #15 scalpel blade.  The primary defect was closed partially with a complex linear closure.  Given the location of the remaining defect, shape of the defect and the proximity to free margins a rhombic flap was deemed most appropriate for complete closure of the defect.  Using a sterile surgical marker, an appropriate advancement flap was drawn incorporating the defect and placing the expected incisions within the relaxed skin tension lines where possible.    The area thus outlined was incised deep to adipose tissue with a #15 scalpel blade.  The skin margins were undermined to an appropriate distance in all directions utilizing iris scissors.
Complex Repair And V-Y Plasty Text: The defect edges were debeveled with a #15 scalpel blade.  The primary defect was closed partially with a complex linear closure.  Given the location of the remaining defect, shape of the defect and the proximity to free margins a V-Y plasty was deemed most appropriate for complete closure of the defect.  Using a sterile surgical marker, an appropriate advancement flap was drawn incorporating the defect and placing the expected incisions within the relaxed skin tension lines where possible.    The area thus outlined was incised deep to adipose tissue with a #15 scalpel blade.  The skin margins were undermined to an appropriate distance in all directions utilizing iris scissors.
Eliptical Excision Additional Text (Leave Blank If You Do Not Want): The margin was drawn around the clinically apparent lesion.  An elliptical shape was then drawn on the skin incorporating the lesion and margins.  Incisions were then made along these lines to the appropriate tissue plane and the lesion was extirpated.
Complex Repair And Epidermal Autograft Text: The defect edges were debeveled with a #15 scalpel blade.  The primary defect was closed partially with a complex linear closure.  Given the location of the defect, shape of the defect and the proximity to free margins an epidermal autograft was deemed most appropriate to repair the remaining defect.  The graft was trimmed to fit the size of the remaining defect.  The graft was then placed in the primary defect, oriented appropriately, and sutured into place.
Complex Repair And O-T Advancement Flap Text: The defect edges were debeveled with a #15 scalpel blade.  The primary defect was closed partially with a complex linear closure.  Given the location of the remaining defect, shape of the defect and the proximity to free margins an O-T advancement flap was deemed most appropriate for complete closure of the defect.  Using a sterile surgical marker, an appropriate advancement flap was drawn incorporating the defect and placing the expected incisions within the relaxed skin tension lines where possible.    The area thus outlined was incised deep to adipose tissue with a #15 scalpel blade.  The skin margins were undermined to an appropriate distance in all directions utilizing iris scissors.
Epidermal Autograft Text: The defect edges were debeveled with a #15 scalpel blade.  Given the location of the defect, shape of the defect and the proximity to free margins an epidermal autograft was deemed most appropriate.  Using a sterile surgical marker, the primary defect shape was transferred to the donor site. The epidermal graft was then harvested.  The skin graft was then placed in the primary defect and oriented appropriately.
Detail Level: Detailed
Cartilage Graft Text: The defect edges were debeveled with a #15 scalpel blade.  Given the location of the defect, shape of the defect, the fact the defect involved a full thickness cartilage defect a cartilage graft was deemed most appropriate.  An appropriate donor site was identified, cleansed, and anesthetized. The cartilage graft was then harvested and transferred to the recipient site, oriented appropriately and then sutured into place.  The secondary defect was then repaired using a primary closure.
Post-Care Instructions: I reviewed with the patient in detail post-care instructions. Patient is not to engage in any heavy lifting, exercise, or swimming for the next 14 days. Should the patient develop any fevers, chills, bleeding, severe pain patient will contact the office immediately.
Slit Excision Additional Text (Leave Blank If You Do Not Want): A linear line was drawn on the skin overlying the lesion. An incision was made slowly until the lesion was visualized.  Once visualized, the lesion was removed with blunt dissection.
Lab Facility: 
Repair Performed By Another Provider Text (Leave Blank If You Do Not Want): After the tissue was excised the defect was repaired by another provider.
O-L Flap Text: The defect edges were debeveled with a #15 scalpel blade.  Given the location of the defect, shape of the defect and the proximity to free margins an O-L flap was deemed most appropriate.  Using a sterile surgical marker, an appropriate advancement flap was drawn incorporating the defect and placing the expected incisions within the relaxed skin tension lines where possible.    The area thus outlined was incised deep to adipose tissue with a #15 scalpel blade.  The skin margins were undermined to an appropriate distance in all directions utilizing iris scissors.
Spiral Flap Text: The defect edges were debeveled with a #15 scalpel blade.  Given the location of the defect, shape of the defect and the proximity to free margins a spiral flap was deemed most appropriate.  Using a sterile surgical marker, an appropriate rotation flap was drawn incorporating the defect and placing the expected incisions within the relaxed skin tension lines where possible. The area thus outlined was incised deep to adipose tissue with a #15 scalpel blade.  The skin margins were undermined to an appropriate distance in all directions utilizing iris scissors.
Complex Repair And Transposition Flap Text: The defect edges were debeveled with a #15 scalpel blade.  The primary defect was closed partially with a complex linear closure.  Given the location of the remaining defect, shape of the defect and the proximity to free margins a transposition flap was deemed most appropriate for complete closure of the defect.  Using a sterile surgical marker, an appropriate advancement flap was drawn incorporating the defect and placing the expected incisions within the relaxed skin tension lines where possible.    The area thus outlined was incised deep to adipose tissue with a #15 scalpel blade.  The skin margins were undermined to an appropriate distance in all directions utilizing iris scissors.
Intermediate / Complex Repair - Final Wound Length In Cm: 3.4
Modified Advancement Flap Text: The defect edges were debeveled with a #15 scalpel blade.  Given the location of the defect, shape of the defect and the proximity to free margins a modified advancement flap was deemed most appropriate.  Using a sterile surgical marker, an appropriate advancement flap was drawn incorporating the defect and placing the expected incisions within the relaxed skin tension lines where possible.    The area thus outlined was incised deep to adipose tissue with a #15 scalpel blade.  The skin margins were undermined to an appropriate distance in all directions utilizing iris scissors.
Consent was obtained from the patient. The risks and benefits to therapy were discussed in detail. Specifically, the risks of infection, scarring, bleeding, prolonged wound healing, incomplete removal, allergy to anesthesia, nerve injury and recurrence were addressed. Prior to the procedure, the treatment site was clearly identified and confirmed by the patient. All components of Universal Protocol/PAUSE Rule completed.
Ear Star Wedge Flap Text: The defect edges were debeveled with a #15 blade scalpel.  Given the location of the defect and the proximity to free margins (helical rim) an ear star wedge flap was deemed most appropriate.  Using a sterile surgical marker, the appropriate flap was drawn incorporating the defect and placing the expected incisions between the helical rim and antihelix where possible.  The area thus outlined was incised through and through with a #15 scalpel blade.
Bilobed Transposition Flap Text: The defect edges were debeveled with a #15 scalpel blade.  Given the location of the defect and the proximity to free margins a bilobed transposition flap was deemed most appropriate.  Using a sterile surgical marker, an appropriate bilobe flap drawn around the defect.    The area thus outlined was incised deep to adipose tissue with a #15 scalpel blade.  The skin margins were undermined to an appropriate distance in all directions utilizing iris scissors.
Island Pedicle Flap With Canthal Suspension Text: The defect edges were debeveled with a #15 scalpel blade.  Given the location of the defect, shape of the defect and the proximity to free margins an island pedicle advancement flap was deemed most appropriate.  Using a sterile surgical marker, an appropriate advancement flap was drawn incorporating the defect, outlining the appropriate donor tissue and placing the expected incisions within the relaxed skin tension lines where possible. The area thus outlined was incised deep to adipose tissue with a #15 scalpel blade.  The skin margins were undermined to an appropriate distance in all directions around the primary defect and laterally outward around the island pedicle utilizing iris scissors.  There was minimal undermining beneath the pedicle flap. A suspension suture was placed in the canthal tendon to prevent tension and prevent ectropion.
Graft Donor Site Bandage (Optional-Leave Blank If You Don't Want In Note): Steri-strips and a pressure bandage were applied to the donor site.
Cheek Interpolation Flap Text: A decision was made to reconstruct the defect utilizing an interpolation axial flap and a staged reconstruction.  A telfa template was made of the defect.  This telfa template was then used to outline the Cheek Interpolation flap.  The donor area for the pedicle flap was then injected with anesthesia.  The flap was excised through the skin and subcutaneous tissue down to the layer of the underlying musculature.  The interpolation flap was carefully excised within this deep plane to maintain its blood supply.  The edges of the donor site were undermined.   The donor site was closed in a primary fashion.  The pedicle was then rotated into position and sutured.  Once the tube was sutured into place, adequate blood supply was confirmed with blanching and refill.  The pedicle was then wrapped with xeroform gauze and dressed appropriately with a telfa and gauze bandage to ensure continued blood supply and protect the attached pedicle.
Billing Type: Third-Party Bill
Keystone Flap Text: The defect edges were debeveled with a #15 scalpel blade.  Given the location of the defect, shape of the defect a keystone flap was deemed most appropriate.  Using a sterile surgical marker, an appropriate keystone flap was drawn incorporating the defect, outlining the appropriate donor tissue and placing the expected incisions within the relaxed skin tension lines where possible. The area thus outlined was incised deep to adipose tissue with a #15 scalpel blade.  The skin margins were undermined to an appropriate distance in all directions around the primary defect and laterally outward around the flap utilizing iris scissors.
Lab: [id number]
Bilateral Helical Rim Advancement Flap Text: The defect edges were debeveled with a #15 blade scalpel.  Given the location of the defect and the proximity to free margins (helical rim) a bilateral helical rim advancement flap was deemed most appropriate.  Using a sterile surgical marker, the appropriate advancement flaps were drawn incorporating the defect and placing the expected incisions between the helical rim and antihelix where possible.  The area thus outlined was incised through and through with a #15 scalpel blade.  With a skin hook and iris scissors, the flaps were gently and sharply undermined and freed up.
Excision Method: Elliptical
Complex Repair And O-L Flap Text: The defect edges were debeveled with a #15 scalpel blade.  The primary defect was closed partially with a complex linear closure.  Given the location of the remaining defect, shape of the defect and the proximity to free margins an O-L flap was deemed most appropriate for complete closure of the defect.  Using a sterile surgical marker, an appropriate flap was drawn incorporating the defect and placing the expected incisions within the relaxed skin tension lines where possible.    The area thus outlined was incised deep to adipose tissue with a #15 scalpel blade.  The skin margins were undermined to an appropriate distance in all directions utilizing iris scissors.
Fusiform Excision Additional Text (Leave Blank If You Do Not Want): The margin was drawn around the clinically apparent lesion.  A fusiform shape was then drawn on the skin incorporating the lesion and margins.  Incisions were then made along these lines to the appropriate tissue plane and the lesion was extirpated.
Complex Repair And Ftsg Text: The defect edges were debeveled with a #15 scalpel blade.  The primary defect was closed partially with a complex linear closure.  Given the location of the defect, shape of the defect and the proximity to free margins a full thickness skin graft was deemed most appropriate to repair the remaining defect.  The graft was trimmed to fit the size of the remaining defect.  The graft was then placed in the primary defect, oriented appropriately, and sutured into place.
Deep Sutures: 4-0 Vicryl
Bilobed Flap Text: The defect edges were debeveled with a #15 scalpel blade.  Given the location of the defect and the proximity to free margins a bilobe flap was deemed most appropriate.  Using a sterile surgical marker, an appropriate bilobe flap drawn around the defect.    The area thus outlined was incised deep to adipose tissue with a #15 scalpel blade.  The skin margins were undermined to an appropriate distance in all directions utilizing iris scissors.
Z Plasty Text: The lesion was extirpated to the level of the fat with a #15 scalpel blade.  Given the location of the defect, shape of the defect and the proximity to free margins a Z-plasty was deemed most appropriate for repair.  Using a sterile surgical marker, the appropriate transposition arms of the Z-plasty were drawn incorporating the defect and placing the expected incisions within the relaxed skin tension lines where possible.    The area thus outlined was incised deep to adipose tissue with a #15 scalpel blade.  The skin margins were undermined to an appropriate distance in all directions utilizing iris scissors.  The opposing transposition arms were then transposed into place in opposite direction and anchored with interrupted buried subcutaneous sutures.
Transposition Flap Text: The defect edges were debeveled with a #15 scalpel blade.  Given the location of the defect and the proximity to free margins a transposition flap was deemed most appropriate.  Using a sterile surgical marker, an appropriate transposition flap was drawn incorporating the defect.    The area thus outlined was incised deep to adipose tissue with a #15 scalpel blade.  The skin margins were undermined to an appropriate distance in all directions utilizing iris scissors.
Excisional Biopsy Additional Text (Leave Blank If You Do Not Want): The margin was drawn around the clinically apparent lesion. An elliptical shape was then drawn on the skin incorporating the lesion and margins.  Incisions were then made along these lines to the appropriate tissue plane and the lesion was extirpated.
Cheek-To-Nose Interpolation Flap Text: A decision was made to reconstruct the defect utilizing an interpolation axial flap and a staged reconstruction.  A telfa template was made of the defect.  This telfa template was then used to outline the Cheek-To-Nose Interpolation flap.  The donor area for the pedicle flap was then injected with anesthesia.  The flap was excised through the skin and subcutaneous tissue down to the layer of the underlying musculature.  The interpolation flap was carefully excised within this deep plane to maintain its blood supply.  The edges of the donor site were undermined.   The donor site was closed in a primary fashion.  The pedicle was then rotated into position and sutured.  Once the tube was sutured into place, adequate blood supply was confirmed with blanching and refill.  The pedicle was then wrapped with xeroform gauze and dressed appropriately with a telfa and gauze bandage to ensure continued blood supply and protect the attached pedicle.
Wound Care: Bacitracin
O-T Advancement Flap Text: The defect edges were debeveled with a #15 scalpel blade.  Given the location of the defect, shape of the defect and the proximity to free margins an O-T advancement flap was deemed most appropriate.  Using a sterile surgical marker, an appropriate advancement flap was drawn incorporating the defect and placing the expected incisions within the relaxed skin tension lines where possible.    The area thus outlined was incised deep to adipose tissue with a #15 scalpel blade.  The skin margins were undermined to an appropriate distance in all directions utilizing iris scissors.
Purse String (Simple) Text: Given the location of the defect and the characteristics of the surrounding skin a purse string simple closure was deemed most appropriate.  Undermining was performed circumferentially around the surgical defect.  A purse string suture was then placed and tightened.
A-T Advancement Flap Text: The defect edges were debeveled with a #15 scalpel blade.  Given the location of the defect, shape of the defect and the proximity to free margins an A-T advancement flap was deemed most appropriate.  Using a sterile surgical marker, an appropriate advancement flap was drawn incorporating the defect and placing the expected incisions within the relaxed skin tension lines where possible.    The area thus outlined was incised deep to adipose tissue with a #15 scalpel blade.  The skin margins were undermined to an appropriate distance in all directions utilizing iris scissors.
Mucosal Advancement Flap Text: Given the location of the defect, shape of the defect and the proximity to free margins a mucosal advancement flap was deemed most appropriate. Incisions were made with a 15 blade scalpel in the appropriate fashion along the cutaneous vermillion border and the mucosal lip. The remaining actinically damaged mucosal tissue was excised.  The mucosal advancement flap was then elevated to the gingival sulcus with care taken to preserve the neurovascular structures and advanced into the primary defect. Care was taken to ensure that precise realignment of the vermilion border was achieved.
Paramedian Forehead Flap Text: A decision was made to reconstruct the defect utilizing an interpolation axial flap and a staged reconstruction.  A telfa template was made of the defect.  This telfa template was then used to outline the paramedian forehead pedicle flap.  The donor area for the pedicle flap was then injected with anesthesia.  The flap was excised through the skin and subcutaneous tissue down to the layer of the underlying musculature.  The pedicle flap was carefully excised within this deep plane to maintain its blood supply.  The edges of the donor site were undermined.   The donor site was closed in a primary fashion.  The pedicle was then rotated into position and sutured.  Once the tube was sutured into place, adequate blood supply was confirmed with blanching and refill.  The pedicle was then wrapped with xeroform gauze and dressed appropriately with a telfa and gauze bandage to ensure continued blood supply and protect the attached pedicle.

## 2017-11-16 ENCOUNTER — APPOINTMENT (RX ONLY)
Dept: URBAN - METROPOLITAN AREA CLINIC 36 | Facility: CLINIC | Age: 72
Setting detail: DERMATOLOGY
End: 2017-11-16

## 2017-11-16 DIAGNOSIS — Z48.02 ENCOUNTER FOR REMOVAL OF SUTURES: ICD-10-CM

## 2017-11-16 PROCEDURE — 99024 POSTOP FOLLOW-UP VISIT: CPT

## 2017-11-16 PROCEDURE — ? SUTURE REMOVAL (GLOBAL PERIOD)

## 2017-11-16 ASSESSMENT — LOCATION DETAILED DESCRIPTION DERM: LOCATION DETAILED: RIGHT DISTAL POSTERIOR THIGH

## 2017-11-16 ASSESSMENT — LOCATION SIMPLE DESCRIPTION DERM: LOCATION SIMPLE: RIGHT POSTERIOR THIGH

## 2017-11-16 ASSESSMENT — LOCATION ZONE DERM: LOCATION ZONE: LEG

## 2017-11-16 NOTE — PROCEDURE: SUTURE REMOVAL (GLOBAL PERIOD)
Add 65949 Cpt? (Important Note: In 2017 The Use Of 90642 Is Being Tracked By Cms To Determine Future Global Period Reimbursement For Global Periods): yes
Detail Level: Detailed

## 2018-01-05 ENCOUNTER — OFFICE VISIT (OUTPATIENT)
Dept: URGENT CARE | Facility: CLINIC | Age: 73
End: 2018-01-05
Payer: MEDICARE

## 2018-01-05 VITALS
SYSTOLIC BLOOD PRESSURE: 146 MMHG | HEIGHT: 62 IN | HEART RATE: 96 BPM | WEIGHT: 160 LBS | OXYGEN SATURATION: 93 % | DIASTOLIC BLOOD PRESSURE: 94 MMHG | BODY MASS INDEX: 29.44 KG/M2 | RESPIRATION RATE: 20 BRPM | TEMPERATURE: 99 F

## 2018-01-05 DIAGNOSIS — R04.0 EPISTAXIS: ICD-10-CM

## 2018-01-05 DIAGNOSIS — J00 OTHER ACUTE RHINITIS: ICD-10-CM

## 2018-01-05 DIAGNOSIS — Z79.01 CHRONIC ANTICOAGULATION: ICD-10-CM

## 2018-01-05 PROCEDURE — 99214 OFFICE O/P EST MOD 30 MIN: CPT | Performed by: PHYSICIAN ASSISTANT

## 2018-01-05 ASSESSMENT — ENCOUNTER SYMPTOMS
DIARRHEA: 0
EYE DISCHARGE: 0
COUGH: 0
SHORTNESS OF BREATH: 0
MYALGIAS: 0
TINGLING: 0
ABDOMINAL PAIN: 0
FEVER: 0
SORE THROAT: 1
VOMITING: 0
NECK PAIN: 0
SPUTUM PRODUCTION: 0
WHEEZING: 0
HEADACHES: 0
CHILLS: 0
EYE REDNESS: 0
DIZZINESS: 0

## 2018-01-05 NOTE — PROGRESS NOTES
"Subjective:      Sandi Peterson is a 72 y.o. female who presents with Nose Bleed (off and on x 3 days \"Only when I blow my nose\" pt reports she blew her nose last night and a \"blood clot\" came out. )            Patient is a 72-year-old female who presents with intermittent nosebleed for the last 3 days. She reports recently getting over had a cold which the last 4 nights she is taking Benadryl. She wakes up in the morning to blow her nose and now she does she reports that she blows out a blood clot from her left nostril. Patient is currently on throughout her history of multiple PEs. She denies any recent use of NSAIDs. Other than that, she denies any active nosebleeds- up which the blood has not ran out of her nose or down the back of her throat. She does report having a small needle size streaked on the back of her throat yesterday morning however she has not seen anything since. Patient denies any lightheadedness, vomiting, shortness of breath.        Review of Systems   Constitutional: Negative for chills, fever and malaise/fatigue.   HENT: Positive for congestion, nosebleeds and sore throat. Negative for ear discharge and ear pain.    Eyes: Negative for discharge and redness.   Respiratory: Negative for cough, sputum production, shortness of breath and wheezing.    Cardiovascular: Negative for chest pain and leg swelling.   Gastrointestinal: Negative for abdominal pain, diarrhea and vomiting.   Genitourinary: Negative for dysuria and urgency.   Musculoskeletal: Negative for myalgias and neck pain.   Skin: Negative for itching and rash.   Neurological: Negative for dizziness, tingling and headaches.          Objective:     /94   Pulse 96   Temp 37.2 °C (99 °F)   Resp 20   Ht 1.575 m (5' 2\")   Wt 72.6 kg (160 lb)   SpO2 93%   Breastfeeding? No   BMI 29.26 kg/m²    PMH:  has a past medical history of Allergic rhinitis; Anxiety; Arthritis; Back pain; DVT (deep venous thrombosis) (CMS-[facility]); GERD " "(gastroesophageal reflux disease); Gout; Hiatal hernia; Hyperlipidemia; Hypertension; Hypoglycemia; Hypothyroid; Hypothyroidism; PE (pulmonary embolism); and Pneumonia.  MEDS:   Current Outpatient Prescriptions:   •  XARELTO 20 MG Tab tablet, TAKE 1 TABLET BY MOUTH EVERY DAY, Disp: 30 Tab, Rfl: 5  •  clonazepam (KLONOPIN) 0.5 MG Tab, Take 0.5 Tabs by mouth 1 time daily as needed (severe neuropathy or anxiety)., Disp: 45 Tab, Rfl: 3  •  ondansetron (ZOFRAN ODT) 4 MG TABLET DISPERSIBLE, Take 1 Tab by mouth every 8 hours as needed for Nausea/Vomiting. AS NEEDED FOR NAUSEA AND VOMITING, Disp: 30 Tab, Rfl: 3  •  metoprolol (LOPRESSOR) 25 MG Tab, Take 0.5 Tabs by mouth every day., Disp: 45 Tab, Rfl: 3  •  spironolactone (ALDACTONE) 25 MG Tab, Take 0.5 Tabs by mouth every day., Disp: 45 Tab, Rfl: 3  •  levothyroxine (SYNTHROID) 50 MCG Tab, Take 1 Tab by mouth Every morning on an empty stomach., Disp: 90 Tab, Rfl: 3  •  Cyanocobalamin (VITAMIN B 12) 250 MCG Lozenge, Take  by mouth every day., Disp: , Rfl:   •  therapeutic multivitamin-minerals (THERAGRAN-M) Tab, Take 1 Tab by mouth every day., Disp: , Rfl:   •  Cholecalciferol (VITAMIN D) 2000 UNITS Cap, Take 1,000 Units by mouth every day at 6 PM., Disp: , Rfl:   •  MELATONIN PO, Take  by mouth., Disp: , Rfl:   •  acetaminophen (TYLENOL) 325 MG TABS, Take 650 mg by mouth every four hours as needed. Indications: Pain, Disp: , Rfl:   •  famotidine (PEPCID) 20 MG TABS, Take 10 mg by mouth 2 times a day as needed. For GERD, Disp: , Rfl:   ALLERGIES:   Allergies   Allergen Reactions   • Ace Inhibitors    • Ciprofloxacin    • Clindamycin    • Lisinopril Vomiting   • Mobic    • Nsaids      Can not take with Xarelto    • Prednisone      \"flu like symptoms\"   • Pseudoephedrine Hcl      SURGHX:   Past Surgical History:   Procedure Laterality Date   • BASAL CELL EXCISION     • HYSTERECTOMY, TOTAL ABDOMINAL     • PB ENLARGE BREAST WITH IMPLANT     • TONSILLECTOMY       SOCHX:  reports " that she has never smoked. She has never used smokeless tobacco. She reports that she does not drink alcohol or use drugs.  FH: Family history was reviewed, no pertinent findings to report    Physical Exam   Constitutional: She is oriented to person, place, and time. She appears well-developed and well-nourished.   HENT:   Head: Normocephalic and atraumatic.   Nose: No nasal deformity, septal deviation or nasal septal hematoma. No epistaxis.  No foreign bodies.   Mouth/Throat: No oropharyngeal exudate.   Ears- Canals clear- TM- with clear fluid effusions bilaterally.   Posterior oropharynx is without any erythema, blood.   Nose appears dry- source of blood was not visualized today.      Eyes: EOM are normal. Pupils are equal, round, and reactive to light.   Neck: Normal range of motion. Neck supple.   Cardiovascular: Normal rate and regular rhythm.    No murmur heard.  Pulmonary/Chest: Effort normal. No respiratory distress.   Musculoskeletal: Normal range of motion. She exhibits no tenderness.   Neurological: She is alert and oriented to person, place, and time.   Skin: Skin is warm. No rash noted.   Psychiatric: She has a normal mood and affect. Her behavior is normal.   Vitals reviewed.              Assessment/Plan:     1. Epistaxis  2. Chronic anticoagulation  3. Other acute rhinitis    At this timeis currently not bleeding without any evidence of blood in the nares or posterior oropharynx. I suspect nasal irritation due to dryness with use of Benadryl and recent rhinitis. Patient was encouraged to avoid the use of Benadryl any further. She is to also start humidification, avoid blowing her nose, or picking. She is not to discontinue the Xarelto at this time. Signs and symptoms of posterior nosebleed were discussed today and concerning red flags were discussed of which would require prompt follow-up. Other supportive therapies were discussed today. No evidence of posterior bleed during time of  visit.  Patient given precautionary s/sx that mandate immediate follow up and evaluation in the ED. Advised of risks of not doing so.    DDX, Supportive care, and indications for immediate follow-up discussed with patient.    Instructed to return to clinic or nearest emergency department if we are not available for any change in condition, further concerns, or worsening of symptoms.    The patient demonstrated a good understanding and agreed with the treatment plan.

## 2018-02-05 DIAGNOSIS — I26.99 OTHER PULMONARY EMBOLISM WITHOUT ACUTE COR PULMONALE, UNSPECIFIED CHRONICITY (HCC): ICD-10-CM

## 2018-02-06 RX ORDER — RIVAROXABAN 20 MG/1
TABLET, FILM COATED ORAL
Qty: 30 TAB | Refills: 3 | Status: SHIPPED | OUTPATIENT
Start: 2018-02-06 | End: 2018-05-21 | Stop reason: SDUPTHER

## 2018-02-06 NOTE — TELEPHONE ENCOUNTER
Have we ever prescribed this med? Yes.  If yes, what date? 08/31/2017    Last OV: 05/15/2017 - Dr. Nails    Next OV: none scheduled; is due back May 2018    DX: PE    Medications: Xarelto

## 2018-04-09 ENCOUNTER — HOSPITAL ENCOUNTER (OUTPATIENT)
Dept: LAB | Facility: MEDICAL CENTER | Age: 73
End: 2018-04-09
Attending: FAMILY MEDICINE
Payer: MEDICARE

## 2018-04-09 ENCOUNTER — HOSPITAL ENCOUNTER (OUTPATIENT)
Dept: LAB | Facility: MEDICAL CENTER | Age: 73
End: 2018-04-09
Attending: INTERNAL MEDICINE
Payer: MEDICARE

## 2018-04-09 DIAGNOSIS — Z87.39 HISTORY OF GOUT: ICD-10-CM

## 2018-04-09 DIAGNOSIS — I10 ESSENTIAL HYPERTENSION: ICD-10-CM

## 2018-04-09 DIAGNOSIS — N18.30 CKD (CHRONIC KIDNEY DISEASE) STAGE 3, GFR 30-59 ML/MIN (HCC): ICD-10-CM

## 2018-04-09 DIAGNOSIS — E03.9 ACQUIRED HYPOTHYROIDISM: ICD-10-CM

## 2018-04-09 DIAGNOSIS — G62.9 PERIPHERAL POLYNEUROPATHY: ICD-10-CM

## 2018-04-09 LAB
ALBUMIN SERPL BCP-MCNC: 4.3 G/DL (ref 3.2–4.9)
ALBUMIN SERPL BCP-MCNC: 4.3 G/DL (ref 3.2–4.9)
ALBUMIN/GLOB SERPL: 1.7 G/DL
ALP SERPL-CCNC: 82 U/L (ref 30–99)
ALT SERPL-CCNC: 16 U/L (ref 2–50)
ANION GAP SERPL CALC-SCNC: 8 MMOL/L (ref 0–11.9)
APPEARANCE UR: CLEAR
AST SERPL-CCNC: 17 U/L (ref 12–45)
BACTERIA #/AREA URNS HPF: NEGATIVE /HPF
BILIRUB SERPL-MCNC: 0.6 MG/DL (ref 0.1–1.5)
BILIRUB UR QL STRIP.AUTO: NEGATIVE
BUN SERPL-MCNC: 21 MG/DL (ref 8–22)
BUN SERPL-MCNC: 21 MG/DL (ref 8–22)
CALCIUM SERPL-MCNC: 9.4 MG/DL (ref 8.5–10.5)
CALCIUM SERPL-MCNC: 9.4 MG/DL (ref 8.5–10.5)
CHLORIDE SERPL-SCNC: 104 MMOL/L (ref 96–112)
CHLORIDE SERPL-SCNC: 105 MMOL/L (ref 96–112)
CO2 SERPL-SCNC: 27 MMOL/L (ref 20–33)
CO2 SERPL-SCNC: 27 MMOL/L (ref 20–33)
COLOR UR: YELLOW
CREAT SERPL-MCNC: 0.97 MG/DL (ref 0.5–1.4)
CREAT SERPL-MCNC: 0.99 MG/DL (ref 0.5–1.4)
CREAT UR-MCNC: 123.7 MG/DL
EPI CELLS #/AREA URNS HPF: ABNORMAL /HPF
GLOBULIN SER CALC-MCNC: 2.6 G/DL (ref 1.9–3.5)
GLUCOSE SERPL-MCNC: 109 MG/DL (ref 65–99)
GLUCOSE SERPL-MCNC: 109 MG/DL (ref 65–99)
GLUCOSE UR STRIP.AUTO-MCNC: NEGATIVE MG/DL
HYALINE CASTS #/AREA URNS LPF: ABNORMAL /LPF
KETONES UR STRIP.AUTO-MCNC: NEGATIVE MG/DL
LEUKOCYTE ESTERASE UR QL STRIP.AUTO: ABNORMAL
MICRO URNS: ABNORMAL
NITRITE UR QL STRIP.AUTO: NEGATIVE
PH UR STRIP.AUTO: 7 [PH]
PHOSPHATE SERPL-MCNC: 2.9 MG/DL (ref 2.5–4.5)
POTASSIUM SERPL-SCNC: 4 MMOL/L (ref 3.6–5.5)
POTASSIUM SERPL-SCNC: 4 MMOL/L (ref 3.6–5.5)
PROT SERPL-MCNC: 6.9 G/DL (ref 6–8.2)
PROT UR QL STRIP: NEGATIVE MG/DL
PROT UR-MCNC: 9.5 MG/DL (ref 0–15)
PROT/CREAT UR: 77 MG/G (ref 10–107)
RBC # URNS HPF: ABNORMAL /HPF
RBC UR QL AUTO: NEGATIVE
SODIUM SERPL-SCNC: 138 MMOL/L (ref 135–145)
SODIUM SERPL-SCNC: 140 MMOL/L (ref 135–145)
SP GR UR STRIP.AUTO: 1.02
TSH SERPL DL<=0.005 MIU/L-ACNC: 1.4 UIU/ML (ref 0.38–5.33)
URATE SERPL-MCNC: 7 MG/DL (ref 1.9–8.2)
UROBILINOGEN UR STRIP.AUTO-MCNC: 0.2 MG/DL
VIT B12 SERPL-MCNC: 1317 PG/ML (ref 211–911)
WBC #/AREA URNS HPF: ABNORMAL /HPF

## 2018-04-09 PROCEDURE — 84156 ASSAY OF PROTEIN URINE: CPT

## 2018-04-09 PROCEDURE — 84443 ASSAY THYROID STIM HORMONE: CPT

## 2018-04-09 PROCEDURE — 82570 ASSAY OF URINE CREATININE: CPT

## 2018-04-09 PROCEDURE — 36415 COLL VENOUS BLD VENIPUNCTURE: CPT

## 2018-04-09 PROCEDURE — 84550 ASSAY OF BLOOD/URIC ACID: CPT

## 2018-04-09 PROCEDURE — 81001 URINALYSIS AUTO W/SCOPE: CPT

## 2018-04-09 PROCEDURE — 82607 VITAMIN B-12: CPT

## 2018-04-09 PROCEDURE — 80069 RENAL FUNCTION PANEL: CPT

## 2018-04-09 PROCEDURE — 80053 COMPREHEN METABOLIC PANEL: CPT

## 2018-04-18 ENCOUNTER — APPOINTMENT (OUTPATIENT)
Dept: PULMONOLOGY | Facility: HOSPICE | Age: 73
End: 2018-04-18
Payer: MEDICARE

## 2018-05-18 ENCOUNTER — TELEPHONE (OUTPATIENT)
Dept: MEDICAL GROUP | Facility: PHYSICIAN GROUP | Age: 73
End: 2018-05-18

## 2018-05-18 NOTE — TELEPHONE ENCOUNTER
ESTABLISHED PATIENT PRE-VISIT PLANNING     Note: Patient will not be contacted if there is no indication to call.     1.  Reviewed notes from the last few office visits within the medical group: Yes    2.  If any orders were placed at last visit or intended to be done for this visit (i.e. 6 mos follow-up), do we have Results/Consult Notes?        •  Labs - Labs ordered, completed on 04/09/18 and results are in chart.   Note: If patient appointment is for lab review and patient did not complete labs, check with provider if OK to reschedule patient until labs completed.       •  Imaging - Imaging ordered, completed and results are in chart.       •  Referrals - No referrals were ordered at last office visit.    3. Is this appointment scheduled as a Hospital Follow-Up? No    4.  Immunizations were updated in Epic using WebIZ?: Epic matches WebIZ       •  Web Iz Recommendations: PREVNAR (PCV13) , TD and ZOSTAVAX (Shingles)    5.  Patient is due for the following Health Maintenance Topics:   Health Maintenance Due   Topic Date Due   • Annual Wellness Visit  1945   • BONE DENSITY  06/11/2008       - Patient has completed FLU, PNEUMOVAX (PPSV23) and TDAP Immunization(s) per WebIZ. Chart has been updated.    6.  MDX printed for Provider? NO    7.  Patient was NOT informed to arrive 15 min prior to their scheduled appointment and bring in their medication bottles.

## 2018-05-21 ENCOUNTER — OFFICE VISIT (OUTPATIENT)
Dept: MEDICAL GROUP | Facility: PHYSICIAN GROUP | Age: 73
End: 2018-05-21
Payer: MEDICARE

## 2018-05-21 VITALS
TEMPERATURE: 99.1 F | BODY MASS INDEX: 29.63 KG/M2 | OXYGEN SATURATION: 91 % | RESPIRATION RATE: 18 BRPM | DIASTOLIC BLOOD PRESSURE: 72 MMHG | SYSTOLIC BLOOD PRESSURE: 150 MMHG | HEART RATE: 79 BPM | WEIGHT: 161 LBS | HEIGHT: 62 IN

## 2018-05-21 DIAGNOSIS — Z86.718 HISTORY OF DVT (DEEP VEIN THROMBOSIS): ICD-10-CM

## 2018-05-21 DIAGNOSIS — N18.30 CKD (CHRONIC KIDNEY DISEASE) STAGE 3, GFR 30-59 ML/MIN (HCC): ICD-10-CM

## 2018-05-21 DIAGNOSIS — Z86.711 HISTORY OF PULMONARY EMBOLISM: ICD-10-CM

## 2018-05-21 DIAGNOSIS — E03.9 ACQUIRED HYPOTHYROIDISM: ICD-10-CM

## 2018-05-21 DIAGNOSIS — G62.9 PERIPHERAL POLYNEUROPATHY: ICD-10-CM

## 2018-05-21 DIAGNOSIS — I10 ESSENTIAL HYPERTENSION: ICD-10-CM

## 2018-05-21 DIAGNOSIS — Z79.01 CHRONIC ANTICOAGULATION: ICD-10-CM

## 2018-05-21 PROBLEM — L72.0 CYST OF SKIN AND SUBCUTANEOUS TISSUE: Status: RESOLVED | Noted: 2017-10-13 | Resolved: 2018-05-21

## 2018-05-21 PROCEDURE — 99214 OFFICE O/P EST MOD 30 MIN: CPT | Performed by: FAMILY MEDICINE

## 2018-05-21 RX ORDER — LANOLIN ALCOHOL/MO/W.PET/CERES
2000 CREAM (GRAM) TOPICAL DAILY
COMMUNITY

## 2018-05-21 ASSESSMENT — PAIN SCALES - GENERAL
PAINLEVEL: NO PAIN
PAINLEVEL: NO PAIN

## 2018-05-21 NOTE — ASSESSMENT & PLAN NOTE
Has burning pain and itching on the bottom of her feet. Takes B12 daily. She has questions about how much B12 to take and which kind of vitamins. We reviewed her most recent B12 level which is above normal.

## 2018-05-22 NOTE — PROGRESS NOTES
Subjective:   Sandi Peterson is a 72 y.o. female here today for follow-up neuropathy, history of blood clots and lab results.    Peripheral polyneuropathy (CMS-HCC)  Has burning pain and itching on the bottom of her feet. Takes B12 daily. She has questions about how much B12 to take and which kind of vitamins. We reviewed her most recent B12 level which is above normal.    Essential hypertension  Slightly elevated today. Patient reports she was in a rush to get here. Monitoring BP at home. Currently taking metoprolol, spironolactone as directed. Also taking baby aspirin. Denies lightheadedness, vision changes, headache, palpitations or leg swelling.    CKD (chronic kidney disease) stage 3, GFR 30-59 ml/min  Stable. Follows with Dr. Mckeon yearly. GFR's have been stable. No flank pain. No issues with urination.    Acquired hypothyroidism  Labwork reviewed and up to date. Taking medicine as directed. Denies palpitations, skin changes, temperature intolerance, changes in bowel habits.    History of pulmonary embolism  History of DVT  Chronic anticoagulation  Stable. Diagnosed in 2013 and again in 2016 with pulmonary emboli and DVT. It was unprovoked, but there is thought that it my have been HRT related. She is on Xarelto for life. She has been following with pulmonary, but asks if it is possible for me to take over.     Current medicines (including changes today)  Current Outpatient Prescriptions   Medication Sig Dispense Refill   • Cyanocobalamin (VITAMIN B-12) 1000 MCG Tab Take 1,000 mcg by mouth every day. 2000 mcg daily     • rivaroxaban (XARELTO) 20 MG Tab tablet Take 1 Tab by mouth every day. 30 Tab 11   • clonazepam (KLONOPIN) 0.5 MG Tab Take 0.5 Tabs by mouth 1 time daily as needed (severe neuropathy or anxiety). 45 Tab 3   • ondansetron (ZOFRAN ODT) 4 MG TABLET DISPERSIBLE Take 1 Tab by mouth every 8 hours as needed for Nausea/Vomiting. AS NEEDED FOR NAUSEA AND VOMITING 30 Tab 3   • metoprolol  "(LOPRESSOR) 25 MG Tab Take 0.5 Tabs by mouth every day. 45 Tab 3   • spironolactone (ALDACTONE) 25 MG Tab Take 0.5 Tabs by mouth every day. 45 Tab 3   • levothyroxine (SYNTHROID) 50 MCG Tab Take 1 Tab by mouth Every morning on an empty stomach. 90 Tab 3   • therapeutic multivitamin-minerals (THERAGRAN-M) Tab Take 1 Tab by mouth every day.     • Cholecalciferol (VITAMIN D) 2000 UNITS Cap Take 1,000 Units by mouth every day at 6 PM.     • acetaminophen (TYLENOL) 325 MG TABS Take 650 mg by mouth every four hours as needed. Indications: Pain     • famotidine (PEPCID) 20 MG TABS Take 10 mg by mouth 2 times a day as needed. For GERD       No current facility-administered medications for this visit.      She  has a past medical history of Allergic rhinitis; Anxiety; Arthritis; Back pain; DVT (deep venous thrombosis) (HCC); GERD (gastroesophageal reflux disease); Gout; Hiatal hernia; Hyperlipidemia; Hypertension; Hypoglycemia; Hypothyroid; Hypothyroidism; PE (pulmonary embolism); and Pneumonia.    ROS   No chest pain, no shortness of breath, no abdominal pain.     Objective:     Physical Exam:  Blood pressure 150/72, pulse 79, temperature 37.3 °C (99.1 °F), resp. rate 18, height 1.575 m (5' 2\"), weight 73 kg (161 lb), SpO2 91 %, not currently breastfeeding. Body mass index is 29.45 kg/m².   Constitutional: Alert, no distress.  Skin: Warm, dry, good turgor, no rashes in visible areas.  Eye: Equal, round and reactive, conjunctiva clear, lids normal.  ENMT: Lips without lesions, good dentition, oropharynx clear.  Neck: Trachea midline, no masses, no thyromegaly.  Respiratory: Unlabored respiratory effort, lungs clear to auscultation, no wheezes, no ronchi.  Cardiovascular: Normal S1, S2, no murmur, no edema.  Abdomen: Soft, non-tender, no masses, no hepatosplenomegaly.  Psych: Alert and oriented x3, normal affect and mood.    Assessment and Plan:     1. Essential hypertension  Well-controlled. Labs as indicated. Continue " antihypertensive medications. Discussed decreasing salt intake. Emphasized benefits of exercise and diet. Continue to monitor.    2. CKD (chronic kidney disease) stage 3, GFR 30-59 ml/min  Chronic and stable. Avoid nephrotoxins. Following with Dr. Mckeon.    3. Acquired hypothyroidism  Continue thyroid medication. Instructed patient to take on empty stomach with glass of water, 30 minutes prior to food or other medications. Labs as indicated.  - TSH; Future    4. Peripheral polyneuropathy (HCC)  Chronic and stable. Lengthy discussion regarding OTC supplements. Continue to monitor.  - VITAMIN B12; Future    5. History of pulmonary embolism  6. History of DVT (deep vein thrombosis)  7. Chronic anticoagulation  Chronic and stable. Patient is on lifelong anticoagulation. It is difficult for her to see her pulmonologist. I am agreeable to taking over management of this issue. Xarelto refilled.  - rivaroxaban (XARELTO) 20 MG Tab tablet; Take 1 Tab by mouth every day.  Dispense: 30 Tab; Refill: 11    Followup: Return in about 5 months (around 10/15/2018) for Medicare AW with health , flu shot.         PLEASE NOTE: This dictation was created using voice recognition software. I have made every reasonable attempt to correct obvious errors, but I expect that there are errors of grammar and possibly content that I did not discover before finalizing the note.

## 2018-05-22 NOTE — ASSESSMENT & PLAN NOTE
Stable. Follows with Dr. Mckeon yearly. GFR's have been stable. No flank pain. No issues with urination.

## 2018-05-22 NOTE — ASSESSMENT & PLAN NOTE
History of DVT  Chronic anticoagulation  Stable. Diagnosed in 2013 and again in 2016 with pulmonary emboli and DVT. It was unprovoked, but there is thought that it my have been HRT related. She is on Xarelto for life. She has been following with pulmonary, but asks if it is possible for me to take over.

## 2018-05-22 NOTE — ASSESSMENT & PLAN NOTE
Slightly elevated today. Patient reports she was in a rush to get here. Monitoring BP at home. Currently taking metoprolol, spironolactone as directed. Also taking baby aspirin. Denies lightheadedness, vision changes, headache, palpitations or leg swelling.

## 2018-07-13 ENCOUNTER — OFFICE VISIT (OUTPATIENT)
Dept: MEDICAL GROUP | Facility: PHYSICIAN GROUP | Age: 73
End: 2018-07-13
Payer: MEDICARE

## 2018-07-13 ENCOUNTER — TELEPHONE (OUTPATIENT)
Dept: MEDICAL GROUP | Facility: PHYSICIAN GROUP | Age: 73
End: 2018-07-13

## 2018-07-13 VITALS
WEIGHT: 162 LBS | RESPIRATION RATE: 16 BRPM | BODY MASS INDEX: 29.81 KG/M2 | HEIGHT: 62 IN | OXYGEN SATURATION: 98 % | HEART RATE: 68 BPM | SYSTOLIC BLOOD PRESSURE: 120 MMHG | DIASTOLIC BLOOD PRESSURE: 70 MMHG | TEMPERATURE: 99.1 F

## 2018-07-13 DIAGNOSIS — Z79.01 CHRONIC ANTICOAGULATION: ICD-10-CM

## 2018-07-13 DIAGNOSIS — Z86.718 HISTORY OF DVT (DEEP VEIN THROMBOSIS): ICD-10-CM

## 2018-07-13 DIAGNOSIS — Z79.899 MEDICATION MANAGEMENT: ICD-10-CM

## 2018-07-13 DIAGNOSIS — Z86.711 HISTORY OF PULMONARY EMBOLISM: ICD-10-CM

## 2018-07-13 PROCEDURE — 99213 OFFICE O/P EST LOW 20 MIN: CPT | Performed by: FAMILY MEDICINE

## 2018-07-13 ASSESSMENT — PAIN SCALES - GENERAL: PAINLEVEL: NO PAIN

## 2018-07-13 NOTE — TELEPHONE ENCOUNTER
MEDICATION PRIOR AUTHORIZATION NEEDED:    1. Name of Medication: Xarelto 20mg     2. Requested By (Name of Pharmacy): Pt's insurance       3. Is insurance on file current? Yes    4. What is the name & phone number of the 3rd party payor? [employer]       DOCUMENTATION OF PAR STATUS:    1. Name of Medication & Dose: Xarelto 20mg      2. Name of Prescription Coverage Company & phone #: [employer]     3. Date Prior Auth Submitted: 7/13/18    4. What information was given to obtain insurance decision? PE and DVT     5. Prior Auth Status? Approved    6. Patient Notified: yes

## 2018-07-13 NOTE — ASSESSMENT & PLAN NOTE
"History of DVT  Chronic anticoagulation  Patient is visibly frustrated today.  She tells me that her insurance recently changed pharmacies and she is worried that she will not be able to get her Xarelto at her local pharmacy.  She wants to go to [facility] pharmacy as she states, \"they know me and my medical conditions best.\"  She has a history significant for multiple and bilateral pulmonary emboli and DVT.  She was hospitalized twice in 2013 and again in 2014 for this.  She had blood clots while on coumadin and thus was changed to Xarelto.  She has been on this for several years without any adverse effects.  She has been following up with a pulmonologist and it was recommended she stay on anticoagulation lifelong.      She denies any issues with bleeding.  No shortness of breath, heart palpitations, leg swelling or pain.      "

## 2018-07-13 NOTE — PROGRESS NOTES
"Subjective:   Sandi Peterson is a 73 y.o. female here today for follow-up history of blood clots and medication management.    History of pulmonary embolism  History of DVT  Chronic anticoagulation  Patient is visibly frustrated today.  She tells me that her insurance recently changed pharmacies and she is worried that she will not be able to get her Xarelto at her local pharmacy.  She wants to go to [facility] pharmacy as she states, \"they know me and my medical conditions best.\"  She has a history significant for multiple and bilateral pulmonary emboli and DVT.  She was hospitalized twice in 2013 and again in 2014 for this.  She had blood clots while on coumadin and thus was changed to Xarelto.  She has been on this for several years without any adverse effects.  She has been following up with a pulmonologist and it was recommended she stay on anticoagulation lifelong.      She denies any issues with bleeding.  No shortness of breath, heart palpitations, leg swelling or pain.    Her main concern is making sure she will be able to keep her prescriptions with [facility].  She is very worried that if she changes pharmacies she may have a medication interaction.    Current medicines (including changes today)  Current Outpatient Prescriptions   Medication Sig Dispense Refill   • Cyanocobalamin (VITAMIN B-12) 1000 MCG Tab Take 1,000 mcg by mouth every day. 2000 mcg daily     • rivaroxaban (XARELTO) 20 MG Tab tablet Take 1 Tab by mouth every day. 30 Tab 11   • clonazepam (KLONOPIN) 0.5 MG Tab Take 0.5 Tabs by mouth 1 time daily as needed (severe neuropathy or anxiety). 45 Tab 3   • ondansetron (ZOFRAN ODT) 4 MG TABLET DISPERSIBLE Take 1 Tab by mouth every 8 hours as needed for Nausea/Vomiting. AS NEEDED FOR NAUSEA AND VOMITING 30 Tab 3   • metoprolol (LOPRESSOR) 25 MG Tab Take 0.5 Tabs by mouth every day. 45 Tab 3   • spironolactone (ALDACTONE) 25 MG Tab Take 0.5 Tabs by mouth every day. 45 Tab 3   • levothyroxine " "(SYNTHROID) 50 MCG Tab Take 1 Tab by mouth Every morning on an empty stomach. 90 Tab 3   • therapeutic multivitamin-minerals (THERAGRAN-M) Tab Take 1 Tab by mouth every day.     • Cholecalciferol (VITAMIN D) 2000 UNITS Cap Take 1,000 Units by mouth every day at 6 PM.     • acetaminophen (TYLENOL) 325 MG TABS Take 650 mg by mouth every four hours as needed. Indications: Pain     • famotidine (PEPCID) 20 MG TABS Take 10 mg by mouth 2 times a day as needed. For GERD       No current facility-administered medications for this visit.      She  has a past medical history of Allergic rhinitis; Anxiety; Arthritis; Back pain; DVT (deep venous thrombosis) (HCC); GERD (gastroesophageal reflux disease); Gout; Hiatal hernia; Hyperlipidemia; Hypertension; Hypoglycemia; Hypothyroid; Hypothyroidism; PE (pulmonary embolism); and Pneumonia.    ROS   No chest pain, no shortness of breath, no abdominal pain.     Objective:     Physical Exam:  Blood pressure 120/70, pulse 68, temperature 37.3 °C (99.1 °F), resp. rate 16, height 1.575 m (5' 2\"), weight 73.5 kg (162 lb), SpO2 98 %, not currently breastfeeding. Body mass index is 29.63 kg/m².   Constitutional: Alert, anxious.  Skin: Warm, dry, good turgor, no rashes in visible areas.  Eye: Equal, round and reactive, conjunctiva clear, lids normal.  ENMT: Lips without lesions, good dentition, oropharynx clear.  Neck: Trachea midline, no masses, no thyromegaly.  Respiratory: Unlabored respiratory effort, no cough.  Abdomen: Soft, no gross masses.  Psych: Alert and oriented x3, normal affect and mood.    Assessment and Plan:     1. History of pulmonary embolism  2. History of DVT (deep vein thrombosis)  3. Chronic anticoagulation  4. Medication management  This is a chronic and very stable issue for the patient.  She was hospitalized several times in the past for pulmonary emboli and DVT.  Given that they were unprovoked, recurrent and bilateral, it is recommended that she be on lifelong " anticoagulation.  I also strongly recommend she stay on Xarelto as she had blood clots while on coumadin in the past.  She has also been evaluated by a pulmonologist and they concur with this recommendation.      Patient's main concern today is staying with the same pharmacy due to an insurance change.  She is aware that the prescription will cost her more at the local pharmacy.  We will contact patient's insurance to see if they require any additional information from our office so that patient may continue to obtain her prescriptions at her local pharmacy per her preference.    Followup: 3 months for Medicare AWV         PLEASE NOTE: This dictation was created using voice recognition software. I have made every reasonable attempt to correct obvious errors, but I expect that there are errors of grammar and possibly content that I did not discover before finalizing the note.

## 2018-07-27 DIAGNOSIS — I10 ESSENTIAL HYPERTENSION: ICD-10-CM

## 2018-07-27 DIAGNOSIS — E03.9 ACQUIRED HYPOTHYROIDISM: ICD-10-CM

## 2018-07-30 RX ORDER — LEVOTHYROXINE SODIUM 0.05 MG/1
TABLET ORAL
Qty: 90 TAB | Refills: 3 | Status: SHIPPED | OUTPATIENT
Start: 2018-07-30 | End: 2019-07-10 | Stop reason: SDUPTHER

## 2018-09-03 DIAGNOSIS — I10 ESSENTIAL HYPERTENSION: ICD-10-CM

## 2018-09-04 RX ORDER — SPIRONOLACTONE 25 MG/1
TABLET ORAL
Qty: 45 TAB | Refills: 3 | Status: SHIPPED | OUTPATIENT
Start: 2018-09-04 | End: 2019-06-28

## 2018-10-02 ENCOUNTER — HOSPITAL ENCOUNTER (OUTPATIENT)
Dept: LAB | Facility: MEDICAL CENTER | Age: 73
End: 2018-10-02
Attending: FAMILY MEDICINE
Payer: MEDICARE

## 2018-10-02 DIAGNOSIS — G62.9 PERIPHERAL POLYNEUROPATHY: ICD-10-CM

## 2018-10-02 DIAGNOSIS — E03.9 ACQUIRED HYPOTHYROIDISM: ICD-10-CM

## 2018-10-02 PROCEDURE — 82607 VITAMIN B-12: CPT

## 2018-10-02 PROCEDURE — 84443 ASSAY THYROID STIM HORMONE: CPT

## 2018-10-02 PROCEDURE — 36415 COLL VENOUS BLD VENIPUNCTURE: CPT

## 2018-10-03 LAB
TSH SERPL DL<=0.005 MIU/L-ACNC: 0.84 UIU/ML (ref 0.38–5.33)
VIT B12 SERPL-MCNC: >1500 PG/ML (ref 211–911)

## 2018-10-09 ENCOUNTER — TELEPHONE (OUTPATIENT)
Dept: MEDICAL GROUP | Facility: PHYSICIAN GROUP | Age: 73
End: 2018-10-09

## 2018-10-09 NOTE — TELEPHONE ENCOUNTER
PVP WITH OUTREACH  Future Appointments       Provider Department Center    10/15/2018 1:20 PM Maricruz Phelan M.D.; [facility]  [facility]           ANNUAL WELLNESS VISIT PRE-VISIT PLANNING     1.  Immunizations were updated in Epic using WebIZ?: Epic matches WebIZ       •  WebIZ Recommendations: FLU and SHINGRIX (Shingles)       •  Is patient due for Tdap? NO       •  Is patient due for Shingles? NO     2.  Specialty Comments was updated with diagnosis information provided by [facility]: NO

## 2018-10-15 ENCOUNTER — APPOINTMENT (OUTPATIENT)
Dept: MEDICAL GROUP | Facility: PHYSICIAN GROUP | Age: 73
End: 2018-10-15
Payer: MEDICARE

## 2018-10-22 ENCOUNTER — NON-PROVIDER VISIT (OUTPATIENT)
Dept: MEDICAL GROUP | Facility: PHYSICIAN GROUP | Age: 73
End: 2018-10-22
Payer: MEDICARE

## 2018-10-22 DIAGNOSIS — Z23 NEED FOR VACCINATION: ICD-10-CM

## 2018-10-22 PROCEDURE — G0008 ADMIN INFLUENZA VIRUS VAC: HCPCS | Performed by: FAMILY MEDICINE

## 2018-10-22 PROCEDURE — 90662 IIV NO PRSV INCREASED AG IM: CPT | Performed by: FAMILY MEDICINE

## 2018-10-22 NOTE — PROGRESS NOTES
"Sandi Peterson is a 73 y.o. female here for a non-provider visit for:   FLU    Reason for immunization: Annual Flu Vaccine  Immunization records indicate need for vaccine: Yes, confirmed with Epic  Minimum interval has been met for this vaccine: Yes  ABN completed: Yes    Order and dose verified by: Delilah SUBRAMANIAN Dated  08/07/18 was given to patient: Yes  All IAC Questionnaire questions were answered \"No.\"    Patient tolerated injection and no adverse effects were observed or reported: Yes    Pt scheduled for next dose in series: Not Indicated  "

## 2018-11-07 ENCOUNTER — OFFICE VISIT (OUTPATIENT)
Dept: MEDICAL GROUP | Facility: PHYSICIAN GROUP | Age: 73
End: 2018-11-07
Payer: MEDICARE

## 2018-11-07 VITALS
DIASTOLIC BLOOD PRESSURE: 72 MMHG | HEIGHT: 62 IN | BODY MASS INDEX: 28.16 KG/M2 | OXYGEN SATURATION: 95 % | WEIGHT: 153 LBS | RESPIRATION RATE: 16 BRPM | HEART RATE: 85 BPM | TEMPERATURE: 98.1 F | SYSTOLIC BLOOD PRESSURE: 138 MMHG

## 2018-11-07 DIAGNOSIS — G62.9 PERIPHERAL POLYNEUROPATHY: ICD-10-CM

## 2018-11-07 DIAGNOSIS — M25.562 ACUTE PAIN OF LEFT KNEE: ICD-10-CM

## 2018-11-07 DIAGNOSIS — M17.12 PRIMARY OSTEOARTHRITIS OF LEFT KNEE: ICD-10-CM

## 2018-11-07 DIAGNOSIS — R09.81 SINUS CONGESTION: ICD-10-CM

## 2018-11-07 DIAGNOSIS — E03.9 ACQUIRED HYPOTHYROIDISM: ICD-10-CM

## 2018-11-07 DIAGNOSIS — H69.92 DYSFUNCTION OF LEFT EUSTACHIAN TUBE: ICD-10-CM

## 2018-11-07 DIAGNOSIS — R73.03 PREDIABETES: ICD-10-CM

## 2018-11-07 PROCEDURE — 99214 OFFICE O/P EST MOD 30 MIN: CPT | Performed by: FAMILY MEDICINE

## 2018-11-07 RX ORDER — FLUTICASONE PROPIONATE 50 MCG
SPRAY, SUSPENSION (ML) NASAL
Qty: 1 BOTTLE | Refills: 3 | Status: SHIPPED | OUTPATIENT
Start: 2018-11-07 | End: 2019-06-28

## 2018-11-07 ASSESSMENT — PATIENT HEALTH QUESTIONNAIRE - PHQ9: CLINICAL INTERPRETATION OF PHQ2 SCORE: 0

## 2018-11-08 NOTE — PROGRESS NOTES
"Subjective:   Sandi Peterson is a 73 y.o. female here today for left knee pain and left ear fullness.    Patient's main concern today is left knee pain.  She tells me that she \"twisted\" her knee a few years ago.  At that time, she had swelling, pain and instability.  X-rays and MRI was performed at that time.  We reviewed these studies that show mild osteoarthritis, Baker's cyst and MCL strain.  Her pain improved without intervention.  However, a couple of days ago, it returned.  She denies any injury or trauma.  She tells me that it started when she was sitting in her recliner watching TV.  She worries she may need another MRI.  She is also worried it could be a blood clot.  She is on Xarelto.    Her other concern today is a feeling of fullness in her left ear.  This started a few weeks ago.  It is associated with tinnitus and sinus congestion.  Denies fever or chills.  She mentions that she also feels dizzy from time to time, especially when changing positions.    Current medicines (including changes today)  Current Outpatient Prescriptions   Medication Sig Dispense Refill   • fluticasone (FLONASE) 50 MCG/ACT nasal spray 1 spray in each nostril twice a day as needed for congestion.` 1 Bottle 3   • spironolactone (ALDACTONE) 25 MG Tab TAKE 1/2 TABLET BY MOUTH EVERY DAY 45 Tab 3   • metoprolol (LOPRESSOR) 25 MG Tab TAKE 1/2 TABLET BY MOUTH EVERY DAY 45 Tab 3   • levothyroxine (SYNTHROID) 50 MCG Tab TAKE 1 TABLET BY MOUTH EVERY MORNING ON AN EMPTY STOMACH 90 Tab 3   • Cyanocobalamin (VITAMIN B-12) 1000 MCG Tab Take 1,000 mcg by mouth every day. 2000 mcg daily     • rivaroxaban (XARELTO) 20 MG Tab tablet Take 1 Tab by mouth every day. 30 Tab 11   • clonazepam (KLONOPIN) 0.5 MG Tab Take 0.5 Tabs by mouth 1 time daily as needed (severe neuropathy or anxiety). 45 Tab 3   • ondansetron (ZOFRAN ODT) 4 MG TABLET DISPERSIBLE Take 1 Tab by mouth every 8 hours as needed for Nausea/Vomiting. AS NEEDED FOR NAUSEA AND " "VOMITING 30 Tab 3   • therapeutic multivitamin-minerals (THERAGRAN-M) Tab Take 1 Tab by mouth every day.     • Cholecalciferol (VITAMIN D) 2000 UNITS Cap Take 1,000 Units by mouth every day at 6 PM.     • acetaminophen (TYLENOL) 325 MG TABS Take 650 mg by mouth every four hours as needed. Indications: Pain     • famotidine (PEPCID) 20 MG TABS Take 10 mg by mouth 2 times a day as needed. For GERD       No current facility-administered medications for this visit.      She  has a past medical history of Allergic rhinitis; Anxiety; Arthritis; Back pain; DVT (deep venous thrombosis) (HCC); GERD (gastroesophageal reflux disease); Gout; Hiatal hernia; Hyperlipidemia; Hypertension; Hypoglycemia; Hypothyroid; Hypothyroidism; PE (pulmonary embolism); and Pneumonia.    ROS   No chest pain, no shortness of breath, no abdominal pain.     Objective:     Physical Exam:  Blood pressure 138/72, pulse 85, temperature 36.7 °C (98.1 °F), temperature source Temporal, resp. rate 16, height 1.575 m (5' 2\"), weight 69.4 kg (153 lb), SpO2 95 %, not currently breastfeeding. Body mass index is 27.98 kg/m².   Constitutional: Alert, no distress, non-toxic appearance.  Skin: Warm, dry, good turgor, no rashes in visible areas.  Eye: Equal, round and reactive, conjunctiva clear, lids normal.  ENMT: TM's clear bilaterally, small clear effusion on left side, lips without lesions, good dentition, oropharynx clear.  Neck: Trachea midline, no masses, no thyromegaly. No cervical or supraclavicular lymphadenopathy.   Respiratory: Unlabored respiratory effort, no cough.  MSK: Slightly antalgic gait, moves all extremities.  Knees: No erythema/edema/ecchymosis/effusion. Tenderness to palpation on along left patellar tendon and left tibial tuberosity. Joint line tenderness negative. Patellar grind test positive. Lachman's negative. Ronny's negative. ROM intact. 5/5 strength bilaterally. 2+ deep tendon reflexes bilaterally.  Psych: Alert and oriented x3, " normal affect and mood.    Assessment and Plan:     1. Acute pain of left knee  2. Primary osteoarthritis of left knee  Worsening.  Most likely osteoarthritis.  No recent injury mechanism to suggest fracture,  meniscal or ligament injury.  Fortunately, patient's pain is rather mild.  I advised conservative management.  She will notify us if her pain does not improve.  In that case, I would move forward with ordering x-rays.    3. Sinus congestion  4. Dysfunction of left eustachian tube  This is a new problem.  Patient's exam findings are reassuring.  Recommend fluticasone nasal spray.  She will follow-up with her ENT if it does not improve.  - fluticasone (FLONASE) 50 MCG/ACT nasal spray; 1 spray in each nostril twice a day as needed for congestion.`  Dispense: 1 Bottle; Refill: 3    5. Peripheral polyneuropathy (HCC)  Labs ordered to monitor.  - COMP METABOLIC PANEL; Future    6. Acquired hypothyroidism  Continue thyroid medication.  Instructed patient to take on empty stomach with glass of water, 30 minutes prior to food or other medications.  Labs as indicated.    7. Prediabetes  Chronic and stable.  Recheck A1c.  - HEMOGLOBIN A1C; Future    Followup: Return in about 4 months (around 3/7/2019) for routine follow-up, short.         PLEASE NOTE: This dictation was created using voice recognition software. I have made every reasonable attempt to correct obvious errors, but I expect that there are errors of grammar and possibly content that I did not discover before finalizing the note.

## 2018-12-20 ENCOUNTER — OFFICE VISIT (OUTPATIENT)
Dept: MEDICAL GROUP | Facility: PHYSICIAN GROUP | Age: 73
End: 2018-12-20
Payer: MEDICARE

## 2018-12-20 VITALS
OXYGEN SATURATION: 95 % | SYSTOLIC BLOOD PRESSURE: 130 MMHG | HEART RATE: 74 BPM | TEMPERATURE: 98.1 F | DIASTOLIC BLOOD PRESSURE: 80 MMHG | HEIGHT: 62 IN | BODY MASS INDEX: 28.52 KG/M2 | WEIGHT: 154.98 LBS

## 2018-12-20 DIAGNOSIS — H53.8 BLURRED VISION, RIGHT EYE: ICD-10-CM

## 2018-12-20 PROCEDURE — 99213 OFFICE O/P EST LOW 20 MIN: CPT | Performed by: FAMILY MEDICINE

## 2018-12-20 ASSESSMENT — VISUAL ACUITY
OS_CC: 20/15
OD_CC: 20/25

## 2018-12-20 NOTE — PROGRESS NOTES
"Subjective:      Sandi Peterson is a 73 y.o. female who presents with Other (right eye infection)        HPI:  Patients presents today with complaints of an infection in her right eye acute onset 2 weeks ago. She describes it as film covering her cornea with intermittent dryness. She has been using Systane lubricant eye drops for 1 week with no alleviation. No alleviation with Flonase nasal spray.  She had a similar sensation 3 years ago and was previously prescribed Tobramycin ointment with alleviation. Right eye is slightly more blurry than her left. No discharge, burning, tearing or trauma. She use bifocal lenses and does not use contacts.        Past medical history, past surgical history, family history reviewed-no changes    Social history reviewed-no changes    Allergies reviewed-no changes    Medications reviewed-no changes      ROS:  As per the HPI as shown above, the rest are negative.       Objective:     /80 (BP Location: Left arm, Patient Position: Sitting, BP Cuff Size: Adult)   Pulse 74   Temp 36.7 °C (98.1 °F) (Temporal)   Ht 1.575 m (5' 2\")   Wt 70.3 kg (154 lb 15.7 oz)   SpO2 95%   BMI 28.35 kg/m²     Physical Exam   Examined alert, awake, oriented, not in distress      Eye: PERRL, EOMs intact, positive red orange reflex both eyes, no conjunctival injection, no discharge, nontender eyeballs on palpation, no swelling, redness of the lids, no sores or lesions on the lids  Neck-supple, no lymphadenopathy, no thyromegaly  Lungs-clear to auscultation, no rales, no wheezes  Heart-regular rate and rhythm, no murmur  Extremities-no edema, clubbing, cyanosis    Visual acuity with correction right eye 20/25, left eye 20/15, both eyes 20/13     Assessment/Plan:   1. Blurred vision, right eye  I will be referring the patient to an opthalmologist for further examination. I recommended that she will try refresh or artificial tears eyedrops.  Avoid any other eyedrops to the eyes.  Avoid picking " the eyes or rubbing the eyes.  Advised humidifier at night in her bedroom when she is sleeping.  - REFERRAL TO OPHTHALMOLOGY          IAnthony (Scribe), am scribing for, and in the presence of, Nataly Marinelli MD     Electronically signed by: Anthony Henley (Scribe), 12/20/2018    Nataly BREWSTER MD personally performed the services described in this documentation, as scribed by Anthony Henley in my presence, and it is both accurate and complete.

## 2019-05-08 DIAGNOSIS — Z79.01 CHRONIC ANTICOAGULATION: ICD-10-CM

## 2019-05-08 DIAGNOSIS — Z86.711 HISTORY OF PULMONARY EMBOLISM: ICD-10-CM

## 2019-05-08 DIAGNOSIS — Z86.718 HISTORY OF DVT (DEEP VEIN THROMBOSIS): ICD-10-CM

## 2019-05-08 RX ORDER — RIVAROXABAN 20 MG/1
TABLET, FILM COATED ORAL
Qty: 90 TAB | Refills: 3 | Status: SHIPPED | OUTPATIENT
Start: 2019-05-08 | End: 2019-07-10 | Stop reason: SDUPTHER

## 2019-05-18 ENCOUNTER — HOSPITAL ENCOUNTER (OUTPATIENT)
Dept: LAB | Facility: MEDICAL CENTER | Age: 74
End: 2019-05-18
Attending: FAMILY MEDICINE
Payer: MEDICARE

## 2019-05-18 ENCOUNTER — HOSPITAL ENCOUNTER (OUTPATIENT)
Dept: LAB | Facility: MEDICAL CENTER | Age: 74
End: 2019-05-18
Attending: INTERNAL MEDICINE
Payer: MEDICARE

## 2019-05-18 DIAGNOSIS — G62.9 PERIPHERAL POLYNEUROPATHY: ICD-10-CM

## 2019-05-18 DIAGNOSIS — R73.03 PREDIABETES: ICD-10-CM

## 2019-05-18 LAB
ALBUMIN SERPL BCP-MCNC: 4.2 G/DL (ref 3.2–4.9)
ALBUMIN SERPL BCP-MCNC: 4.2 G/DL (ref 3.2–4.9)
ALBUMIN/GLOB SERPL: 1.6 G/DL
ALBUMIN/GLOB SERPL: 1.7 G/DL
ALP SERPL-CCNC: 78 U/L (ref 30–99)
ALP SERPL-CCNC: 79 U/L (ref 30–99)
ALT SERPL-CCNC: 10 U/L (ref 2–50)
ALT SERPL-CCNC: 10 U/L (ref 2–50)
ANION GAP SERPL CALC-SCNC: 9 MMOL/L (ref 0–11.9)
ANION GAP SERPL CALC-SCNC: 9 MMOL/L (ref 0–11.9)
AST SERPL-CCNC: 14 U/L (ref 12–45)
AST SERPL-CCNC: 15 U/L (ref 12–45)
BASOPHILS # BLD AUTO: 0.8 % (ref 0–1.8)
BASOPHILS # BLD: 0.06 K/UL (ref 0–0.12)
BILIRUB SERPL-MCNC: 0.5 MG/DL (ref 0.1–1.5)
BILIRUB SERPL-MCNC: 0.6 MG/DL (ref 0.1–1.5)
BUN SERPL-MCNC: 20 MG/DL (ref 8–22)
BUN SERPL-MCNC: 20 MG/DL (ref 8–22)
CALCIUM SERPL-MCNC: 9.4 MG/DL (ref 8.5–10.5)
CALCIUM SERPL-MCNC: 9.4 MG/DL (ref 8.5–10.5)
CHLORIDE SERPL-SCNC: 105 MMOL/L (ref 96–112)
CHLORIDE SERPL-SCNC: 105 MMOL/L (ref 96–112)
CO2 SERPL-SCNC: 27 MMOL/L (ref 20–33)
CO2 SERPL-SCNC: 27 MMOL/L (ref 20–33)
CREAT SERPL-MCNC: 0.98 MG/DL (ref 0.5–1.4)
CREAT SERPL-MCNC: 1 MG/DL (ref 0.5–1.4)
CREAT UR-MCNC: 113.9 MG/DL
EOSINOPHIL # BLD AUTO: 0.22 K/UL (ref 0–0.51)
EOSINOPHIL NFR BLD: 3.1 % (ref 0–6.9)
ERYTHROCYTE [DISTWIDTH] IN BLOOD BY AUTOMATED COUNT: 48.7 FL (ref 35.9–50)
EST. AVERAGE GLUCOSE BLD GHB EST-MCNC: 128 MG/DL
FASTING STATUS PATIENT QL REPORTED: NORMAL
FASTING STATUS PATIENT QL REPORTED: NORMAL
GLOBULIN SER CALC-MCNC: 2.5 G/DL (ref 1.9–3.5)
GLOBULIN SER CALC-MCNC: 2.7 G/DL (ref 1.9–3.5)
GLUCOSE SERPL-MCNC: 93 MG/DL (ref 65–99)
GLUCOSE SERPL-MCNC: 94 MG/DL (ref 65–99)
HBA1C MFR BLD: 6.1 % (ref 0–5.6)
HCT VFR BLD AUTO: 47 % (ref 37–47)
HGB BLD-MCNC: 14.7 G/DL (ref 12–16)
IMM GRANULOCYTES # BLD AUTO: 0.03 K/UL (ref 0–0.11)
IMM GRANULOCYTES NFR BLD AUTO: 0.4 % (ref 0–0.9)
LYMPHOCYTES # BLD AUTO: 1.61 K/UL (ref 1–4.8)
LYMPHOCYTES NFR BLD: 22.6 % (ref 22–41)
MAGNESIUM SERPL-MCNC: 2.2 MG/DL (ref 1.5–2.5)
MCH RBC QN AUTO: 29.3 PG (ref 27–33)
MCHC RBC AUTO-ENTMCNC: 31.3 G/DL (ref 33.6–35)
MCV RBC AUTO: 93.6 FL (ref 81.4–97.8)
MONOCYTES # BLD AUTO: 0.43 K/UL (ref 0–0.85)
MONOCYTES NFR BLD AUTO: 6 % (ref 0–13.4)
NEUTROPHILS # BLD AUTO: 4.77 K/UL (ref 2–7.15)
NEUTROPHILS NFR BLD: 67.1 % (ref 44–72)
NRBC # BLD AUTO: 0 K/UL
NRBC BLD-RTO: 0 /100 WBC
PLATELET # BLD AUTO: 247 K/UL (ref 164–446)
PMV BLD AUTO: 10.6 FL (ref 9–12.9)
POTASSIUM SERPL-SCNC: 4.3 MMOL/L (ref 3.6–5.5)
POTASSIUM SERPL-SCNC: 4.4 MMOL/L (ref 3.6–5.5)
PROT SERPL-MCNC: 6.7 G/DL (ref 6–8.2)
PROT SERPL-MCNC: 6.9 G/DL (ref 6–8.2)
PROT UR-MCNC: 9.1 MG/DL (ref 0–15)
RBC # BLD AUTO: 5.02 M/UL (ref 4.2–5.4)
SODIUM SERPL-SCNC: 141 MMOL/L (ref 135–145)
SODIUM SERPL-SCNC: 141 MMOL/L (ref 135–145)
WBC # BLD AUTO: 7.1 K/UL (ref 4.8–10.8)

## 2019-05-18 PROCEDURE — 80053 COMPREHEN METABOLIC PANEL: CPT | Mod: 91

## 2019-05-18 PROCEDURE — 36415 COLL VENOUS BLD VENIPUNCTURE: CPT

## 2019-05-18 PROCEDURE — 83036 HEMOGLOBIN GLYCOSYLATED A1C: CPT | Mod: GA

## 2019-05-18 PROCEDURE — 82570 ASSAY OF URINE CREATININE: CPT

## 2019-05-18 PROCEDURE — 84156 ASSAY OF PROTEIN URINE: CPT

## 2019-05-18 PROCEDURE — 85025 COMPLETE CBC W/AUTO DIFF WBC: CPT

## 2019-05-18 PROCEDURE — 83735 ASSAY OF MAGNESIUM: CPT

## 2019-05-18 PROCEDURE — 80053 COMPREHEN METABOLIC PANEL: CPT

## 2019-06-21 ENCOUNTER — OFFICE VISIT (OUTPATIENT)
Dept: CARDIOLOGY | Facility: MEDICAL CENTER | Age: 74
End: 2019-06-21
Payer: MEDICARE

## 2019-06-21 VITALS
HEIGHT: 62 IN | DIASTOLIC BLOOD PRESSURE: 96 MMHG | HEART RATE: 66 BPM | SYSTOLIC BLOOD PRESSURE: 150 MMHG | BODY MASS INDEX: 27.6 KG/M2 | WEIGHT: 150 LBS | OXYGEN SATURATION: 96 %

## 2019-06-21 DIAGNOSIS — R07.2 PRECORDIAL PAIN: ICD-10-CM

## 2019-06-21 DIAGNOSIS — E78.00 PURE HYPERCHOLESTEROLEMIA: ICD-10-CM

## 2019-06-21 DIAGNOSIS — R06.02 SOB (SHORTNESS OF BREATH): ICD-10-CM

## 2019-06-21 PROCEDURE — 99205 OFFICE O/P NEW HI 60 MIN: CPT | Performed by: INTERNAL MEDICINE

## 2019-06-21 PROCEDURE — 93000 ELECTROCARDIOGRAM COMPLETE: CPT | Performed by: INTERNAL MEDICINE

## 2019-06-21 ASSESSMENT — ENCOUNTER SYMPTOMS
NEUROLOGICAL NEGATIVE: 1
CONSTITUTIONAL NEGATIVE: 1
GASTROINTESTINAL NEGATIVE: 1
DEPRESSION: 0
SHORTNESS OF BREATH: 1
MUSCULOSKELETAL NEGATIVE: 1

## 2019-06-21 NOTE — PROGRESS NOTES
Chief Complaint   Patient presents with   • Shortness of Breath       Subjective:   Sandi Peterson is a 74 y.o. female who is seen in consultation today at the request of Dr. Mckeon for evaluation of exertional chest pain.  Patient has history of hypertension and chronic kidney disease.  Her past history is also notable for massive saddle pulmonary embolism in 2013, remains on chronic anticoagulation.  Starting in March, the patient developed anterior chest discomfort with activity.  She was going for her daily [place] Walk when she developed discomfort in her mid chest with radiation to the parasternal regions bilaterally.  The pain did not radiate to her back, shoulders, or jaw.  The pain was never severe enough to make her stop walking but as soon as she completed a walk the discomfort went away.  Because of this consistent exertional chest discomfort she discontinued her daily walks in April.  The pattern has not changed and she is never had pain at rest.  Her cardiac risk factor profile is positive for hypertension and hyperlipidemia.  Her LDL cholesterol 2014 was 167.  There is no family history of premature coronary disease, smoking, or diabetes.      Past Medical History:   Diagnosis Date   • Allergic rhinitis    • Anxiety    • Arthritis    • Back pain    • DVT (deep venous thrombosis) (HCC)    • GERD (gastroesophageal reflux disease)    • Gout    • Hiatal hernia    • Hyperlipidemia    • Hypertension    • Hypoglycemia    • Hypothyroid    • Hypothyroidism    • PE (pulmonary embolism)    • Pneumonia      Past Surgical History:   Procedure Laterality Date   • BASAL CELL EXCISION     • HYSTERECTOMY, TOTAL ABDOMINAL     • PB ENLARGE BREAST WITH IMPLANT     • TONSILLECTOMY       History reviewed. No pertinent family history.  Social History     Social History   • Marital status: Single     Spouse name: N/A   • Number of children: N/A   • Years of education: N/A     Occupational History   • Not on file.  "    Social History Main Topics   • Smoking status: Never Smoker   • Smokeless tobacco: Never Used   • Alcohol use No   • Drug use: No   • Sexual activity: Not on file     Other Topics Concern   • Not on file     Social History Narrative   • No narrative on file     Allergies   Allergen Reactions   • Lisinopril Vomiting   • Nsaids Unspecified     Can not take with Xarelto    • Prednisone Myalgia     \"flu like symptoms\"   • Mobic Vomiting   • Ace Inhibitors      On a Beta blocker   • Ciprofloxacin Unspecified     unknown   • Clindamycin Unspecified     unknown   • Pseudoephedrine Hcl      Outpatient Encounter Prescriptions as of 6/21/2019   Medication Sig Dispense Refill   • XARELTO 20 MG Tab tablet TAKE 1 TABLET BY MOUTH EVERY DAY 90 Tab 3   • fluticasone (FLONASE) 50 MCG/ACT nasal spray 1 spray in each nostril twice a day as needed for congestion.` 1 Bottle 3   • spironolactone (ALDACTONE) 25 MG Tab TAKE 1/2 TABLET BY MOUTH EVERY DAY 45 Tab 3   • metoprolol (LOPRESSOR) 25 MG Tab TAKE 1/2 TABLET BY MOUTH EVERY DAY 45 Tab 3   • levothyroxine (SYNTHROID) 50 MCG Tab TAKE 1 TABLET BY MOUTH EVERY MORNING ON AN EMPTY STOMACH 90 Tab 3   • Cyanocobalamin (VITAMIN B-12) 1000 MCG Tab Take 1,000 mcg by mouth every day. 2000 mcg daily     • clonazepam (KLONOPIN) 0.5 MG Tab Take 0.5 Tabs by mouth 1 time daily as needed (severe neuropathy or anxiety). 45 Tab 3   • ondansetron (ZOFRAN ODT) 4 MG TABLET DISPERSIBLE Take 1 Tab by mouth every 8 hours as needed for Nausea/Vomiting. AS NEEDED FOR NAUSEA AND VOMITING 30 Tab 3   • therapeutic multivitamin-minerals (THERAGRAN-M) Tab Take 1 Tab by mouth every day.     • acetaminophen (TYLENOL) 325 MG TABS Take 650 mg by mouth every four hours as needed. Indications: Pain     • famotidine (PEPCID) 20 MG TABS Take 10 mg by mouth 2 times a day as needed. For GERD     • Cholecalciferol (VITAMIN D) 2000 UNITS Cap Take 1,000 Units by mouth every day at 6 PM.       No facility-administered " "encounter medications on file as of 6/21/2019.      Review of Systems   Constitutional: Negative.    HENT: Negative.    Respiratory: Positive for shortness of breath.    Cardiovascular: Positive for chest pain.   Gastrointestinal: Negative.    Musculoskeletal: Negative.    Skin: Negative.    Neurological: Negative.    Endo/Heme/Allergies: Negative.    Psychiatric/Behavioral: Negative for depression.        Objective:   /96 (BP Location: Left arm, Patient Position: Sitting, BP Cuff Size: Adult)   Pulse 66   Ht 1.575 m (5' 2\")   Wt 68 kg (150 lb)   SpO2 96%   BMI 27.44 kg/m²     Physical Exam   Constitutional: She is oriented to person, place, and time. No distress.   HENT:   Head: Normocephalic and atraumatic.   Eyes: Pupils are equal, round, and reactive to light. No scleral icterus.   Neck: No JVD present.   Cardiovascular: Normal rate and regular rhythm.    No murmur heard.  Pulmonary/Chest: No respiratory distress. She has no wheezes.   Abdominal: Soft. She exhibits no distension. There is no tenderness.   Musculoskeletal: She exhibits no edema.   Neurological: She is alert and oriented to person, place, and time.   Skin: Skin is warm.   Psychiatric: She has a normal mood and affect.       Assessment:     1. SOB (shortness of breath)  EKG   2. Pure hypercholesterolemia     3. Precordial pain         Medical Decision Making:  Today's Assessment / Status / Plan:   Chest pain: The patient has history of exertional chest pain that is quite worrisome for angina.  Her discomfort is reliably reproduced with activity and relieved by rest.  She has had no rest anginal at all.  Because of her symptoms would recommend moving directly to coronary angiography.  In the interim she will increase her metoprolol to twice daily.  She has history of chronic kidney disease with most recent GFR 55.  We need to ensure that she is adequately hydrated prior to the procedure.  The procedure was discussed with her and the " inherent risks of conscious sedation, bleeding, kidney injury and need for emergent surgery were discussed.  She will be scheduled for outpatient angiography    Hypertension: Under fair control.  Blood pressure is 150 systolic left arm.  She will increase her metoprolol as outlined above.    Chronic anticoagulation: Patient had a large pulmonary embolism in 2013 and remains on anticoagulation indefinitely.  She will hold her Xarelto the night prior to the procedure.

## 2019-06-21 NOTE — LETTER
[facility]   [address]   [address]  [place] [address]  Phone: [phone]  Fax: [phone]              Sandi Peterson  1945    Encounter Date: 6/21/2019    Logan Griffin M.D.          PROGRESS NOTE:  Chief Complaint   Patient presents with   • Shortness of Breath       Subjective:   Sandi Peterson is a 74 y.o. female who is seen in consultation today at the request of Dr. Mckeon for evaluation of exertional chest pain.  Patient has history of hypertension and chronic kidney disease.  Her past history is also notable for massive saddle pulmonary embolism in 2013, remains on chronic anticoagulation.  Starting in March, the patient developed anterior chest discomfort with activity.  She was going for her daily [place] Walk when she developed discomfort in her mid chest with radiation to the parasternal regions bilaterally.  The pain did not radiate to her back, shoulders, or jaw.  The pain was never severe enough to make her stop walking but as soon as she completed a walk the discomfort went away.  Because of this consistent exertional chest discomfort she discontinued her daily walks in April.  The pattern has not changed and she is never had pain at rest.  Her cardiac risk factor profile is positive for hypertension and hyperlipidemia.  Her LDL cholesterol 2014 was 167.  There is no family history of premature coronary disease, smoking, or diabetes.      Past Medical History:   Diagnosis Date   • Allergic rhinitis    • Anxiety    • Arthritis    • Back pain    • DVT (deep venous thrombosis) (HCC)    • GERD (gastroesophageal reflux disease)    • Gout    • Hiatal hernia    • Hyperlipidemia    • Hypertension    • Hypoglycemia    • Hypothyroid    • Hypothyroidism    • PE (pulmonary embolism)    • Pneumonia      Past Surgical History:   Procedure Laterality Date   • BASAL CELL EXCISION     • HYSTERECTOMY, TOTAL ABDOMINAL     •  "PB ENLARGE BREAST WITH IMPLANT     • TONSILLECTOMY       History reviewed. No pertinent family history.  Social History     Social History   • Marital status: Single     Spouse name: N/A   • Number of children: N/A   • Years of education: N/A     Occupational History   • Not on file.     Social History Main Topics   • Smoking status: Never Smoker   • Smokeless tobacco: Never Used   • Alcohol use No   • Drug use: No   • Sexual activity: Not on file     Other Topics Concern   • Not on file     Social History Narrative   • No narrative on file     Allergies   Allergen Reactions   • Lisinopril Vomiting   • Nsaids Unspecified     Can not take with Xarelto    • Prednisone Myalgia     \"flu like symptoms\"   • Mobic Vomiting   • Ace Inhibitors      On a Beta blocker   • Ciprofloxacin Unspecified     unknown   • Clindamycin Unspecified     unknown   • Pseudoephedrine Hcl      Outpatient Encounter Prescriptions as of 6/21/2019   Medication Sig Dispense Refill   • XARELTO 20 MG Tab tablet TAKE 1 TABLET BY MOUTH EVERY DAY 90 Tab 3   • fluticasone (FLONASE) 50 MCG/ACT nasal spray 1 spray in each nostril twice a day as needed for congestion.` 1 Bottle 3   • spironolactone (ALDACTONE) 25 MG Tab TAKE 1/2 TABLET BY MOUTH EVERY DAY 45 Tab 3   • metoprolol (LOPRESSOR) 25 MG Tab TAKE 1/2 TABLET BY MOUTH EVERY DAY 45 Tab 3   • levothyroxine (SYNTHROID) 50 MCG Tab TAKE 1 TABLET BY MOUTH EVERY MORNING ON AN EMPTY STOMACH 90 Tab 3   • Cyanocobalamin (VITAMIN B-12) 1000 MCG Tab Take 1,000 mcg by mouth every day. 2000 mcg daily     • clonazepam (KLONOPIN) 0.5 MG Tab Take 0.5 Tabs by mouth 1 time daily as needed (severe neuropathy or anxiety). 45 Tab 3   • ondansetron (ZOFRAN ODT) 4 MG TABLET DISPERSIBLE Take 1 Tab by mouth every 8 hours as needed for Nausea/Vomiting. AS NEEDED FOR NAUSEA AND VOMITING 30 Tab 3   • therapeutic multivitamin-minerals (THERAGRAN-M) Tab Take 1 Tab by mouth every day.     • acetaminophen (TYLENOL) 325 MG TABS Take " "650 mg by mouth every four hours as needed. Indications: Pain     • famotidine (PEPCID) 20 MG TABS Take 10 mg by mouth 2 times a day as needed. For GERD     • Cholecalciferol (VITAMIN D) 2000 UNITS Cap Take 1,000 Units by mouth every day at 6 PM.       No facility-administered encounter medications on file as of 6/21/2019.      Review of Systems   Constitutional: Negative.    HENT: Negative.    Respiratory: Positive for shortness of breath.    Cardiovascular: Positive for chest pain.   Gastrointestinal: Negative.    Musculoskeletal: Negative.    Skin: Negative.    Neurological: Negative.    Endo/Heme/Allergies: Negative.    Psychiatric/Behavioral: Negative for depression.        Objective:   /96 (BP Location: Left arm, Patient Position: Sitting, BP Cuff Size: Adult)   Pulse 66   Ht 1.575 m (5' 2\")   Wt 68 kg (150 lb)   SpO2 96%   BMI 27.44 kg/m²      Physical Exam   Constitutional: She is oriented to person, place, and time. No distress.   HENT:   Head: Normocephalic and atraumatic.   Eyes: Pupils are equal, round, and reactive to light. No scleral icterus.   Neck: No JVD present.   Cardiovascular: Normal rate and regular rhythm.    No murmur heard.  Pulmonary/Chest: No respiratory distress. She has no wheezes.   Abdominal: Soft. She exhibits no distension. There is no tenderness.   Musculoskeletal: She exhibits no edema.   Neurological: She is alert and oriented to person, place, and time.   Skin: Skin is warm.   Psychiatric: She has a normal mood and affect.       Assessment:     1. SOB (shortness of breath)  EKG   2. Pure hypercholesterolemia     3. Precordial pain         Medical Decision Making:  Today's Assessment / Status / Plan:   Chest pain: The patient has history of exertional chest pain that is quite worrisome for angina.  Her discomfort is reliably reproduced with activity and relieved by rest.  She has had no rest anginal at all.  Because of her symptoms would recommend moving directly to " coronary angiography.  In the interim she will increase her metoprolol to twice daily.  She has history of chronic kidney disease with most recent GFR 55.  We need to ensure that she is adequately hydrated prior to the procedure.  The procedure was discussed with her and the inherent risks of conscious sedation, bleeding, kidney injury and need for emergent surgery were discussed.  She will be scheduled for outpatient angiography    Hypertension: Under fair control.  Blood pressure is 150 systolic left arm.  She will increase her metoprolol as outlined above.    Chronic anticoagulation: Patient had a large pulmonary embolism in 2013 and remains on anticoagulation indefinitely.  She will hold her Xarelto the night prior to the procedure.      Maricruz Phelan M.D.  [address]  [place] [address]  VIA In Basket     Lane Mckeon M.D.  [address]  [address]  [place] [address]  VIA Facsimile: [phone]

## 2019-06-22 LAB — EKG IMPRESSION: NORMAL

## 2019-06-25 ENCOUNTER — TELEPHONE (OUTPATIENT)
Dept: CARDIOLOGY | Facility: MEDICAL CENTER | Age: 74
End: 2019-06-25

## 2019-06-25 DIAGNOSIS — I20.89 ANGINA EFFORT: ICD-10-CM

## 2019-06-25 NOTE — TELEPHONE ENCOUNTER
Patient is scheduled on 7-2-19 for a [facility] w/poss with Dr. Donald. Patient was told to hold xarelto for 48hrs prior and to check in at 9:00 for an 11:00 procedure.H&P was done on 6-21-19 by Dr. Griffin. Pre admit is scheduled on 6-28-19 at 1:15.

## 2019-06-25 NOTE — TELEPHONE ENCOUNTER
"Spoke with pt.   1. Pt. Is conferned about holding Xarelto 20mg. \"Dr. Griffin told me I could hold it I day prior, but  told me to hold it for 48 hrs. Which is correct?\"  2. Pt has breast implants and is concerned if this has any bearing on procedure.    To Dr. Griffin.  "

## 2019-06-25 NOTE — TELEPHONE ENCOUNTER
----- Message from Sonia Centeno sent at 6/25/2019  2:53 PM PDT -----  Regarding: Please call patient  Please call patient in regards to  questions that she has about her C w/poss that Dr. Griffin is ordering.

## 2019-06-25 NOTE — TELEPHONE ENCOUNTER
----- Message from Sonia Centeno sent at 6/25/2019  9:09 AM PDT -----  Regarding: HERIBERTO w/poss   Can you please order this in EPIC?

## 2019-06-26 NOTE — TELEPHONE ENCOUNTER
Logan Griffin M.D.  REMEDIOS Awan.PSHELLEY.   Caller: Unspecified (Yesterday,  3:46 PM)             Breast implants won't interfere.   Since  said 48 hours, go with that      Spoke with pt regarding above. She verbalized understanding. Eased her mind regarding the Xarelto and holding it as she is anxious about holding it. All questions answered.

## 2019-06-28 ENCOUNTER — HOSPITAL ENCOUNTER (OUTPATIENT)
Dept: RADIOLOGY | Facility: MEDICAL CENTER | Age: 74
End: 2019-06-28
Attending: INTERNAL MEDICINE
Payer: MEDICARE

## 2019-06-28 ENCOUNTER — TELEPHONE (OUTPATIENT)
Dept: CARDIOLOGY | Facility: MEDICAL CENTER | Age: 74
End: 2019-06-28

## 2019-06-28 DIAGNOSIS — Z01.811 PRE-OPERATIVE RESPIRATORY EXAMINATION: ICD-10-CM

## 2019-06-28 DIAGNOSIS — Z01.812 PRE-OPERATIVE LABORATORY EXAMINATION: ICD-10-CM

## 2019-06-28 DIAGNOSIS — Z01.810 PRE-OPERATIVE CARDIOVASCULAR EXAMINATION: ICD-10-CM

## 2019-06-28 LAB
ALBUMIN SERPL BCP-MCNC: 4.6 G/DL (ref 3.2–4.9)
ALBUMIN/GLOB SERPL: 1.6 G/DL
ALP SERPL-CCNC: 78 U/L (ref 30–99)
ALT SERPL-CCNC: 15 U/L (ref 2–50)
ANION GAP SERPL CALC-SCNC: 11 MMOL/L (ref 0–11.9)
APTT PPP: 30.6 SEC (ref 24.7–36)
AST SERPL-CCNC: 19 U/L (ref 12–45)
BILIRUB SERPL-MCNC: 0.5 MG/DL (ref 0.1–1.5)
BUN SERPL-MCNC: 27 MG/DL (ref 8–22)
CALCIUM SERPL-MCNC: 10 MG/DL (ref 8.5–10.5)
CHLORIDE SERPL-SCNC: 102 MMOL/L (ref 96–112)
CO2 SERPL-SCNC: 26 MMOL/L (ref 20–33)
CREAT SERPL-MCNC: 1.15 MG/DL (ref 0.5–1.4)
EKG IMPRESSION: NORMAL
ERYTHROCYTE [DISTWIDTH] IN BLOOD BY AUTOMATED COUNT: 46.7 FL (ref 35.9–50)
GLOBULIN SER CALC-MCNC: 2.8 G/DL (ref 1.9–3.5)
GLUCOSE SERPL-MCNC: 104 MG/DL (ref 65–99)
HCT VFR BLD AUTO: 46.6 % (ref 37–47)
HGB BLD-MCNC: 15.3 G/DL (ref 12–16)
INR PPP: 1.05 (ref 0.87–1.13)
MCH RBC QN AUTO: 30.2 PG (ref 27–33)
MCHC RBC AUTO-ENTMCNC: 32.8 G/DL (ref 33.6–35)
MCV RBC AUTO: 91.9 FL (ref 81.4–97.8)
PLATELET # BLD AUTO: 232 K/UL (ref 164–446)
PMV BLD AUTO: 10.8 FL (ref 9–12.9)
POTASSIUM SERPL-SCNC: 3.8 MMOL/L (ref 3.6–5.5)
PROT SERPL-MCNC: 7.4 G/DL (ref 6–8.2)
PROTHROMBIN TIME: 14 SEC (ref 12–14.6)
RBC # BLD AUTO: 5.07 M/UL (ref 4.2–5.4)
SODIUM SERPL-SCNC: 139 MMOL/L (ref 135–145)
WBC # BLD AUTO: 7.9 K/UL (ref 4.8–10.8)

## 2019-06-28 PROCEDURE — 85730 THROMBOPLASTIN TIME PARTIAL: CPT

## 2019-06-28 PROCEDURE — 93005 ELECTROCARDIOGRAM TRACING: CPT

## 2019-06-28 PROCEDURE — 36415 COLL VENOUS BLD VENIPUNCTURE: CPT

## 2019-06-28 PROCEDURE — 85027 COMPLETE CBC AUTOMATED: CPT

## 2019-06-28 PROCEDURE — 85610 PROTHROMBIN TIME: CPT

## 2019-06-28 PROCEDURE — 71046 X-RAY EXAM CHEST 2 VIEWS: CPT

## 2019-06-28 PROCEDURE — 80053 COMPREHEN METABOLIC PANEL: CPT

## 2019-06-28 PROCEDURE — 93010 ELECTROCARDIOGRAM REPORT: CPT | Performed by: INTERNAL MEDICINE

## 2019-06-28 RX ORDER — SPIRONOLACTONE 25 MG/1
12.5 TABLET ORAL PRN
COMMUNITY
End: 2019-07-10 | Stop reason: SDUPTHER

## 2019-06-28 RX ORDER — POLYETHYLENE GLYCOL 3350 17 G/17G
17 POWDER, FOR SOLUTION ORAL PRN
COMMUNITY
End: 2022-10-13

## 2019-06-28 NOTE — TELEPHONE ENCOUNTER
Pt. Wanted to ensure that IF she needs Plavix 75mg post [facility] scheduled 7-2-19  that Dr. Donald prescribes the GENERIC Clopidogrel.  Note to Dr. Donald for FYI.

## 2019-07-01 ENCOUNTER — TELEPHONE (OUTPATIENT)
Dept: CARDIOLOGY | Facility: MEDICAL CENTER | Age: 74
End: 2019-07-01

## 2019-07-01 DIAGNOSIS — N18.30 CKD (CHRONIC KIDNEY DISEASE) STAGE 3, GFR 30-59 ML/MIN (HCC): ICD-10-CM

## 2019-07-01 NOTE — TELEPHONE ENCOUNTER
----- Message from Catherine Kilgore sent at 7/1/2019  8:07 AM PDT -----  Regarding: Concern about GFR level has procedure scheduled tomorrow   Contact: [phone]  [facility]     Pt is concerned about GFR level going 10 points as she has procedure scheduled tomorrow. She's also having major anxiety. Please call pt to advise at [phone].

## 2019-07-01 NOTE — TELEPHONE ENCOUNTER
Pt. Is scheduled fot [facility] 7-2-19.  Pt's. GFR has been stable at 55-56. Pre-procedure GFR drawn 6-28-19=46 and pt. Is VERY! Concerned.   Pt. Is aware she should hydrate really well starting today. Reassured pt. That procedure orders include IV  @150cc/hr. On arrival. Also reassured pt. That she will be able to discuss concerns with nurses and drFernando Prior to cath. Encouraged pt. To take her Klonopin 0.25mg as ordered for her anxiety.  FYI to Dr. Donald and Dr. Griffin.

## 2019-07-01 NOTE — TELEPHONE ENCOUNTER
Called pt. She takes Spironolactone prn and hasn't taken it for a few weeks.   STAT BMP order faxed to cath lab [phone].       Message   Received: Today   Message Contents   Logan Griffin M.D.  SYL AwanPGRZEGORZ   Caller: Unspecified (Today,  8:35 AM)             Discontinue spironolactone.  Repeat BMP on the morning of admission.

## 2019-07-02 ENCOUNTER — HOSPITAL ENCOUNTER (OUTPATIENT)
Facility: MEDICAL CENTER | Age: 74
End: 2019-07-02
Attending: INTERNAL MEDICINE | Admitting: INTERNAL MEDICINE
Payer: MEDICARE

## 2019-07-02 ENCOUNTER — APPOINTMENT (OUTPATIENT)
Dept: CARDIOLOGY | Facility: MEDICAL CENTER | Age: 74
End: 2019-07-02
Attending: INTERNAL MEDICINE
Payer: MEDICARE

## 2019-07-02 VITALS
HEART RATE: 61 BPM | WEIGHT: 151.68 LBS | DIASTOLIC BLOOD PRESSURE: 79 MMHG | RESPIRATION RATE: 25 BRPM | BODY MASS INDEX: 27.91 KG/M2 | TEMPERATURE: 96.5 F | OXYGEN SATURATION: 93 % | SYSTOLIC BLOOD PRESSURE: 138 MMHG | HEIGHT: 62 IN

## 2019-07-02 DIAGNOSIS — R06.02 SOB (SHORTNESS OF BREATH): ICD-10-CM

## 2019-07-02 DIAGNOSIS — R07.2 PRECORDIAL PAIN: ICD-10-CM

## 2019-07-02 DIAGNOSIS — E78.00 PURE HYPERCHOLESTEROLEMIA: ICD-10-CM

## 2019-07-02 LAB
ANION GAP SERPL CALC-SCNC: 12 MMOL/L (ref 0–11.9)
BUN SERPL-MCNC: 24 MG/DL (ref 8–22)
CALCIUM SERPL-MCNC: 9.5 MG/DL (ref 8.5–10.5)
CHLORIDE SERPL-SCNC: 108 MMOL/L (ref 96–112)
CO2 SERPL-SCNC: 21 MMOL/L (ref 20–33)
CREAT SERPL-MCNC: 0.91 MG/DL (ref 0.5–1.4)
GLUCOSE SERPL-MCNC: 101 MG/DL (ref 65–99)
POTASSIUM SERPL-SCNC: 4.2 MMOL/L (ref 3.6–5.5)
SODIUM SERPL-SCNC: 141 MMOL/L (ref 135–145)

## 2019-07-02 PROCEDURE — 93458 L HRT ARTERY/VENTRICLE ANGIO: CPT

## 2019-07-02 PROCEDURE — 700111 HCHG RX REV CODE 636 W/ 250 OVERRIDE (IP)

## 2019-07-02 PROCEDURE — 160002 HCHG RECOVERY MINUTES (STAT)

## 2019-07-02 PROCEDURE — 700117 HCHG RX CONTRAST REV CODE 255: Performed by: INTERNAL MEDICINE

## 2019-07-02 PROCEDURE — 93458 L HRT ARTERY/VENTRICLE ANGIO: CPT | Mod: 26 | Performed by: INTERNAL MEDICINE

## 2019-07-02 PROCEDURE — 700105 HCHG RX REV CODE 258: Performed by: INTERNAL MEDICINE

## 2019-07-02 PROCEDURE — 700101 HCHG RX REV CODE 250

## 2019-07-02 PROCEDURE — 99152 MOD SED SAME PHYS/QHP 5/>YRS: CPT | Performed by: INTERNAL MEDICINE

## 2019-07-02 PROCEDURE — 80048 BASIC METABOLIC PNL TOTAL CA: CPT

## 2019-07-02 RX ORDER — VERAPAMIL HYDROCHLORIDE 2.5 MG/ML
INJECTION, SOLUTION INTRAVENOUS
Status: DISCONTINUED
Start: 2019-07-02 | End: 2019-07-02 | Stop reason: HOSPADM

## 2019-07-02 RX ORDER — LIDOCAINE HYDROCHLORIDE 20 MG/ML
INJECTION, SOLUTION INFILTRATION; PERINEURAL
Status: DISCONTINUED
Start: 2019-07-02 | End: 2019-07-02 | Stop reason: HOSPADM

## 2019-07-02 RX ORDER — LIDOCAINE HYDROCHLORIDE 20 MG/ML
INJECTION, SOLUTION INFILTRATION; PERINEURAL
Status: COMPLETED
Start: 2019-07-02 | End: 2019-07-02

## 2019-07-02 RX ORDER — VERAPAMIL HYDROCHLORIDE 2.5 MG/ML
INJECTION, SOLUTION INTRAVENOUS
Status: COMPLETED
Start: 2019-07-02 | End: 2019-07-02

## 2019-07-02 RX ORDER — SODIUM CHLORIDE 9 MG/ML
INJECTION, SOLUTION INTRAVENOUS CONTINUOUS
Status: ACTIVE | OUTPATIENT
Start: 2019-07-02 | End: 2019-07-02

## 2019-07-02 RX ORDER — SODIUM CHLORIDE 9 MG/ML
INJECTION, SOLUTION INTRAVENOUS CONTINUOUS
Status: DISCONTINUED | OUTPATIENT
Start: 2019-07-02 | End: 2019-07-02

## 2019-07-02 RX ORDER — MIDAZOLAM HYDROCHLORIDE 1 MG/ML
INJECTION INTRAMUSCULAR; INTRAVENOUS
Status: COMPLETED
Start: 2019-07-02 | End: 2019-07-02

## 2019-07-02 RX ORDER — HEPARIN SODIUM,PORCINE 1000/ML
VIAL (ML) INJECTION
Status: DISCONTINUED
Start: 2019-07-02 | End: 2019-07-02 | Stop reason: HOSPADM

## 2019-07-02 RX ORDER — HEPARIN SODIUM,PORCINE 1000/ML
VIAL (ML) INJECTION
Status: COMPLETED
Start: 2019-07-02 | End: 2019-07-02

## 2019-07-02 RX ADMIN — MIDAZOLAM HYDROCHLORIDE 2 MG: 1 INJECTION, SOLUTION INTRAMUSCULAR; INTRAVENOUS at 11:56

## 2019-07-02 RX ADMIN — FENTANYL CITRATE 50 MCG: 50 INJECTION INTRAMUSCULAR; INTRAVENOUS at 12:05

## 2019-07-02 RX ADMIN — HEPARIN SODIUM 2000 UNITS: 200 INJECTION, SOLUTION INTRAVENOUS at 10:30

## 2019-07-02 RX ADMIN — SODIUM CHLORIDE: 9 INJECTION, SOLUTION INTRAVENOUS at 09:45

## 2019-07-02 RX ADMIN — LIDOCAINE HYDROCHLORIDE: 20 INJECTION, SOLUTION INFILTRATION; PERINEURAL at 10:30

## 2019-07-02 RX ADMIN — IOHEXOL 25 ML: 350 INJECTION, SOLUTION INTRAVENOUS at 12:06

## 2019-07-02 RX ADMIN — VERAPAMIL HYDROCHLORIDE 2.5 MG: 2.5 INJECTION, SOLUTION INTRAVENOUS at 10:30

## 2019-07-02 RX ADMIN — NITROGLYCERIN 10 ML: 20 INJECTION INTRAVENOUS at 10:30

## 2019-07-02 RX ADMIN — HEPARIN SODIUM: 1000 INJECTION, SOLUTION INTRAVENOUS; SUBCUTANEOUS at 10:30

## 2019-07-02 NOTE — DISCHARGE INSTRUCTIONS
ACTIVITY: Rest and take it easy for the first 24 hours.  A responsible adult is recommended to remain with you during that time.  It is normal to feel sleepy.  We encourage you to not do anything that requires balance, judgment or coordination.    MILD FLU-LIKE SYMPTOMS ARE NORMAL. YOU MAY EXPERIENCE GENERALIZED MUSCLE ACHES, THROAT IRRITATION, HEADACHE AND/OR SOME NAUSEA.    FOR 24 HOURS DO NOT:  Drive, operate machinery or run household appliances.  Drink beer or alcoholic beverages.   Make important decisions or sign legal documents.    DIET: To avoid nausea, slowly advance diet as tolerated, avoiding spicy or greasy foods for the first day.  Add more substantial food to your diet according to your physician's instructions.  Babies can be fed formula or breast milk as soon as they are hungry.  INCREASE FLUIDS AND FIBER TO AVOID CONSTIPATION.    SURGICAL DRESSING/BATHING: Keep dressing dry for 24 hours. May remove dressing and shower after 5pm on 7/3, do not need to replace dressing. Do not submerge in water or bath for 7 days.     FOLLOW-UP APPOINTMENT:  A follow-up appointment should be arranged with your doctor; call to schedule.    You should CALL YOUR PHYSICIAN if you develop:  Fever greater than 101 degrees F.  Pain not relieved by medication, or persistent nausea or vomiting.  Excessive bleeding (blood soaking through dressing) or unexpected drainage from the wound.  Extreme redness or swelling around the incision site, drainage of pus or foul smelling drainage.  Inability to urinate or empty your bladder within 8 hours.  Problems with breathing or chest pain.    You should call 911 if you develop problems with breathing or chest pain.  If you are unable to contact your doctor or surgical center, you should go to the nearest emergency room or urgent care center.  Physician's telephone #: CARDIOLOGY [phone]    If any questions arise, call your doctor.  If your doctor is not available, please feel free to  call the Surgical Center at [phone].  The Center is open Monday through Friday from 7AM to 7PM.  You can also call the HEALTH HOTLINE open 24 hours/day, 7 days/week and speak to a nurse at [phone], or toll free at [phone].    A registered nurse may call you a few days after your surgery to see how you are doing after your procedure.    MEDICATIONS: Resume taking daily medication.  Take prescribed pain medication with food.  If no medication is prescribed, you may take non-aspirin pain medication if needed.  PAIN MEDICATION CAN BE VERY CONSTIPATING.  Take a stool softener or laxative such as senokot, pericolace, or milk of magnesia if needed.    If your physician has prescribed pain medication that includes Acetaminophen (Tylenol), do not take additional Acetaminophen (Tylenol) while taking the prescribed medication.    Depression / Suicide Risk    As you are discharged from this [facility] facility, it is important to learn how to keep safe from harming yourself.    Recognize the warning signs:  · Abrupt changes in personality, positive or negative- including increase in energy   · Giving away possessions  · Change in eating patterns- significant weight changes-  positive or negative  · Change in sleeping patterns- unable to sleep or sleeping all the time   · Unwillingness or inability to communicate  · Depression  · Unusual sadness, discouragement and loneliness  · Talk of wanting to die  · Neglect of personal appearance   · Rebelliousness- reckless behavior  · Withdrawal from people/activities they love  · Confusion- inability to concentrate     If you or a loved one observes any of these behaviors or has concerns about self-harm, here's what you can do:  · Talk about it- your feelings and reasons for harming yourself  · Remove any means that you might use to hurt yourself (examples: pills, rope, extension cords, firearm)  · Get professional help from the community (Mental Health, Substance  Abuse, psychological counseling)  · Do not be alone:Call your Safe Contact- someone whom you trust who will be there for you.  · Call your local CRISIS HOTLINE 382-3081 or 850-712-2770  · Call your local Children's Mobile Crisis Response Team [place] (242) 504-6595 or www.TokBox  · Call the toll free National Suicide Prevention Hotlines   · National Suicide Prevention Lifeline 954-687-TCYL (2307)  · [employer] Line Network 800-SUICIDE (396-5078)    Radial Catherization Discharge Instructions      · Do not subject hand/arm to any forceful movements for 24 hours    i.e. supporting weight when rising from the chair or bed.   · Do not drive a car for 24 hours  · You may remove the dressing tomorrow  · You may shower on the day following your procedure.  Do not take a tub bath or submerge the puncture site in water for 3 days following the procedure.  · No Lifting more than 3-5 pounds with affected wrist for 5 days  · Follow up with Dr. Donald  2-4 weeks.  · Increase fluids for 2 days post procedure.  · Continue all previous medications unless otherwise instructed.    If bleeding should occur following discharge:  · Sit down and apply firm pressure to site with your fingers for 10 minutes  · If the bleeding stops, continue to sit quietly, keeping your wrist straight for 2 hours.  Notify physician as soon as possible ( [phone])  · If bleeding does not stop after 10 minutes, or if there is a large amount of bleeding or spurting, call 911 immediately.  Do not drive yourself to the hospital.

## 2019-07-02 NOTE — PROCEDURES
Cardiac Catheterization report    7/2/2019  1:48 PM    Referring MD:     Primary Care Provider: Maricruz Phelan M.D.    Indication for procedure: Chest pain    Procedures performed:  ·  Coronary arteriograms  · Left heart catheterization and Left ventriculogram     Final impression:  Angiographically normal appearing coronary arteries.  Elevated LVEDP    Recommendations:  Guideline directed medical therapy   Cardiovascular Risk factor modification    Findings:  1.  Left main coronary artery:  Normal.  2.  Left anterior descending artery:  Normal. LAD gives two diagonal branches, which have no significant disease  3.  Left circumflex coronary artery:  Normal. Gives one marginal branches, which have no significant disease   4.  Right coronary artery:  Normal.  This is a right dominant system.  5.  Left ventricular end diastolic pressure:  29 mmHg.  No signficant gradient across the aortic valve.  6.  Left ventriculogram:  Ejection fraction of 60.      EBL: <10 CC    Specimens: None    Procedure details:  The risks and benefits of cardiac catheterization and possible intervention were explained to the patient including death, heart attack, stroke, and emergency surgery.  The patient verbalized understanding and wished to proceed.  The patient was brought to the cardiac catheterization laboratory in the fasting state and prepped and draped in the usual sterile fashion.  The right wrist was locally anesthetized with lidocaine and the right radial artery was cannulated with 5/6-Sami equipment and standard radial cocktail was given.  Coronary angiography was performed using JR 4 and JL 3.5  diagnostic catheters in the usual fashion, results mentioned below.  JR 4 catheter was used to cross the aortic valve to perform  left heart catheterization and left ventriculogram.    Once all the views were obtained, all wires and catheters were removed from the patient without difficulty.  A Vasc-Band was placed over the  right radial artery and the radial artery sheath was removed without difficulty.      Complications:  None    Sedation time:  I supervised moderate sedation over a trained independent nursing staff,  Sedation Start time: 11:46     Sedation Stop time: 12:04      ARLIN Quintana  [facility] heart and vascular health

## 2019-07-02 NOTE — OR NURSING
1219 Pt arrived to PPU with cath lab RN. AAOx4. VSS. Denies pain and nausea. Right TR Band is CDI and soft. CMS intact. Pt denies numbness/tingling. Belongings returned to pt bedside. POC update given.  1330 2cc air removed from TR band. CDI with no bleeding.   1345 Ambulated to bathroom with no complications. Tolerating oral intake.  1350 3cc air removed from TR band. CDI with no bleeding.   1410 3cc air removed from TR band. CDI with no bleeding.   1430 3cc air removed from TR band. CDI with no bleeding.TR band removed and replaced with tegaderm, gauze and coban wrap. CMS intact. Pt denies numbness/tinging.   1435 Pt given discharge instructions. Discussed diet, activity, follow-up, symptoms and management, and prescriptions provided. IV d/c'd. All questions answered.    1458 Pt discharged to family via wheelchair with PPU CCT. All belongings with pt.

## 2019-07-03 ENCOUNTER — TELEPHONE (OUTPATIENT)
Dept: CARDIOLOGY | Facility: MEDICAL CENTER | Age: 74
End: 2019-07-03

## 2019-07-03 NOTE — TELEPHONE ENCOUNTER
Erin Suarez, R.N.   Phone Number: [phone]             RS/stepan     Pt calling for angiogram results and wants to know if it's safe to take spironolactone so soon following angiogram, pt has some puffiness, water retention.  Please call .      Spoke with patient regarding above. Advised her of note from angio. And advised her to resume all medications as prescribed. She verbalized understanding.

## 2019-07-05 ENCOUNTER — TELEPHONE (OUTPATIENT)
Dept: CARDIOLOGY | Facility: MEDICAL CENTER | Age: 74
End: 2019-07-05

## 2019-07-05 NOTE — TELEPHONE ENCOUNTER
I did review her cath results.  It appears she does not have any significant coronary artery disease and does not need stents or surgery.  She should discuss the findings with Dr. Griffin as he had ordered the test.  -Maricruz Phelan M.D.

## 2019-07-10 ENCOUNTER — HOSPITAL ENCOUNTER (OUTPATIENT)
Dept: LAB | Facility: MEDICAL CENTER | Age: 74
End: 2019-07-10
Attending: FAMILY MEDICINE
Payer: MEDICARE

## 2019-07-10 ENCOUNTER — OFFICE VISIT (OUTPATIENT)
Dept: MEDICAL GROUP | Facility: PHYSICIAN GROUP | Age: 74
End: 2019-07-10
Payer: MEDICARE

## 2019-07-10 VITALS
WEIGHT: 152 LBS | SYSTOLIC BLOOD PRESSURE: 128 MMHG | OXYGEN SATURATION: 96 % | HEART RATE: 71 BPM | RESPIRATION RATE: 16 BRPM | HEIGHT: 62 IN | BODY MASS INDEX: 27.97 KG/M2 | DIASTOLIC BLOOD PRESSURE: 70 MMHG | TEMPERATURE: 98.4 F

## 2019-07-10 DIAGNOSIS — R07.2 PRECORDIAL CHEST PAIN: ICD-10-CM

## 2019-07-10 DIAGNOSIS — Z79.01 CHRONIC ANTICOAGULATION: ICD-10-CM

## 2019-07-10 DIAGNOSIS — E03.9 ACQUIRED HYPOTHYROIDISM: ICD-10-CM

## 2019-07-10 DIAGNOSIS — Z86.718 HISTORY OF DVT (DEEP VEIN THROMBOSIS): ICD-10-CM

## 2019-07-10 DIAGNOSIS — N18.30 CKD (CHRONIC KIDNEY DISEASE) STAGE 3, GFR 30-59 ML/MIN (HCC): ICD-10-CM

## 2019-07-10 DIAGNOSIS — G62.9 PERIPHERAL POLYNEUROPATHY: ICD-10-CM

## 2019-07-10 DIAGNOSIS — R73.03 PREDIABETES: ICD-10-CM

## 2019-07-10 DIAGNOSIS — E78.00 PURE HYPERCHOLESTEROLEMIA: ICD-10-CM

## 2019-07-10 DIAGNOSIS — Z86.711 HISTORY OF PULMONARY EMBOLISM: ICD-10-CM

## 2019-07-10 DIAGNOSIS — I10 ESSENTIAL HYPERTENSION: ICD-10-CM

## 2019-07-10 LAB
ANION GAP SERPL CALC-SCNC: 12 MMOL/L (ref 0–11.9)
BUN SERPL-MCNC: 26 MG/DL (ref 8–22)
CALCIUM SERPL-MCNC: 9.4 MG/DL (ref 8.5–10.5)
CHLORIDE SERPL-SCNC: 102 MMOL/L (ref 96–112)
CO2 SERPL-SCNC: 25 MMOL/L (ref 20–33)
CREAT SERPL-MCNC: 1.08 MG/DL (ref 0.5–1.4)
GLUCOSE SERPL-MCNC: 100 MG/DL (ref 65–99)
POTASSIUM SERPL-SCNC: 4 MMOL/L (ref 3.6–5.5)
SODIUM SERPL-SCNC: 139 MMOL/L (ref 135–145)

## 2019-07-10 PROCEDURE — 99215 OFFICE O/P EST HI 40 MIN: CPT | Performed by: FAMILY MEDICINE

## 2019-07-10 PROCEDURE — 36415 COLL VENOUS BLD VENIPUNCTURE: CPT

## 2019-07-10 PROCEDURE — 80048 BASIC METABOLIC PNL TOTAL CA: CPT

## 2019-07-10 RX ORDER — CLONAZEPAM 0.5 MG/1
0.25 TABLET ORAL
Qty: 30 TAB | Refills: 2 | Status: SHIPPED
Start: 2019-07-10 | End: 2019-08-09

## 2019-07-10 RX ORDER — SPIRONOLACTONE 25 MG/1
12.5 TABLET ORAL DAILY
Qty: 45 TAB | Refills: 3 | Status: SHIPPED | OUTPATIENT
Start: 2019-07-10 | End: 2020-09-29

## 2019-07-10 RX ORDER — LEVOTHYROXINE SODIUM 0.05 MG/1
50 TABLET ORAL EVERY MORNING
Qty: 90 TAB | Refills: 3 | Status: SHIPPED | OUTPATIENT
Start: 2019-07-10 | End: 2020-06-22

## 2019-07-10 NOTE — PROGRESS NOTES
"Subjective:   Sandi Peterson is a 74 y.o. female here today for follow-up of her recent angiogram as well as for medication refills. She is unaccompanied for today's visit.     Precordial chest pain  History of pulmonary embolism  History of DVT (deep vein thrombosis)  Chronic anticoagulation  The patient states that she recently completed an angiogram on 7/2/19 performed by Dr. Donald. Sandi reports that she began to experience shortness of breath with chest discomfort in March and April of this year which she believed to be related to scar tissue secondary to her history of PE. She had discussed this with Dr. Mckeon in June during her annual exam with him, and was quickly referred to Dr. Griffin, cardiology, for further evaluation. Dr. Griffin had scheduled for the patient to complete an angiogram which was completed last week as previously stated. Overall, the angiogram was unremarkable although patient was informed that she has a \"stiff heart\". Sandi feels well at this time and denies any current chest discomfort or shortness of breath. She adds that Dr. Griffin has instructed her to take her beta blocker twice nightly. Of note, patient remains on chronic anticoagulation with Xarelto 20 mg daily.     Essential hypertension  Blood pressure is well-controlled today at 128/70. She continues to take her medications as prescribed. No adverse effects noted. Denies headache, palpitations, dizziness, or leg swelling.     Pure hypercholesterolemia  Chronic and controlled with current dietary modifications.     Acquired hypothyroidism  Patient continues to take levothyroxine 50 mcg daily. She denies any side effects from the medication. No thyroid related complaints today.     CKD (chronic kidney disease) stage 3, GFR 30-59 ml/min ([facility])  This is closely followed by Dr. Mckeon, nephrology. Recent labs indicate decrease in GFR, which patient states has improved with drinking adequate amounts of water. Bun was elevated " on recent labs at 24 as well. She continues to avoid nephrotoxic agents.     Peripheral polyneuropathy (HCC)  Patient requests a refill of her clonazepam at this time. She would like to have this filled to help her manage her neuropathy and recent increase in stress and anxiety. Of note, this has not been filled by me since 2017.     Current medicines (including changes today)  Current Outpatient Prescriptions   Medication Sig Dispense Refill   • levothyroxine (SYNTHROID) 50 MCG Tab Take 1 Tab by mouth every morning. ON A EMPTY STOMACH 90 Tab 3   • metoprolol (LOPRESSOR) 25 MG Tab Take 0.5 Tabs by mouth 2 times a day. 180 Tab 3   • spironolactone (ALDACTONE) 25 MG Tab Take 0.5 Tabs by mouth every day. 45 Tab 3   • clonazePAM (KLONOPIN) 0.5 MG Tab Take 0.5 Tabs by mouth 1 time daily as needed (severe neuropathy or anxiety) for up to 30 days. 30 Tab 2   • rivaroxaban (XARELTO) 20 MG Tab tablet Take 1 Tab by mouth every day. 30 Tab 11   • polyethylene glycol/lytes (MIRALAX) Pack Take 17 g by mouth as needed.     • Cyanocobalamin (VITAMIN B-12) 1000 MCG Tab Take 2,000 mcg by mouth every day.     • therapeutic multivitamin-minerals (THERAGRAN-M) Tab Take 1 Tab by mouth every day.     • Cholecalciferol (VITAMIN D) 2000 UNITS Cap Take 1,000 Units by mouth every day.     • acetaminophen (TYLENOL) 325 MG TABS Take 325-650 mg by mouth as needed.     • famotidine (PEPCID) 20 MG TABS Take 10 mg by mouth as needed. For GERD       No current facility-administered medications for this visit.      She  has a past medical history of Allergic rhinitis; Angina of effort ([facility]); Anxiety; Arthritis; Breath shortness; Cataract (06/28/2019); DVT (deep venous thrombosis) ([facility]) (2013); GERD (gastroesophageal reflux disease); Gout; Hiatus hernia syndrome; Hyperlipidemia; Hypertension; Hypoglycemia; Hypothyroidism; PE (pulmonary embolism) (2013); Pneumonia (2005); Renal insufficiency; and Urinary frequency.    ROS   No chest pain, no shortness  "of breath, no abdominal pain.     Objective:     Physical Exam:  /70   Pulse 71   Temp 36.9 °C (98.4 °F)   Resp 16   Ht 1.575 m (5' 2\")   Wt 68.9 kg (152 lb)   SpO2 96%  Body mass index is 27.8 kg/m².   Constitutional: Alert, no distress, well-groomed.  Skin: Warm, dry, good turgor, no rashes in visible areas.  Eye: Equal, round and reactive, conjunctiva clear, lids normal.  ENMT: Lips without lesions, good dentition, moist mucous membranes.  Neck: Trachea midline, no masses, no thyromegaly.  Respiratory: Unlabored respiratory effort, no cough.  Abdomen: Soft, no gross masses.  MSK: Normal gait, moves all extremities.  Neuro: Grossly non-focal. No cranial nerve deficit. Strength and sensation intact.   Psych: Alert and oriented x3, anxious.     Admission on 2019, Discharged on 2019   Component Date Value Ref Range Status   • Sodium 2019 141  135 - 145 mmol/L Final   • Potassium 2019 4.2  3.6 - 5.5 mmol/L Final   • Chloride 2019 108  96 - 112 mmol/L Final   • Co2 2019 21  20 - 33 mmol/L Final   • Glucose 2019 101* 65 - 99 mg/dL Final   • Bun 2019 24* 8 - 22 mg/dL Final   • Creatinine 2019 0.91  0.50 - 1.40 mg/dL Final   • Calcium 2019 9.5  8.5 - 10.5 mg/dL Final   • Anion Gap 2019 12.0* 0.0 - 11.9 Final   • GFR If  2019 >60  >60 mL/min/1.73 m 2 Final   • GFR If Non  2019 >60  >60 mL/min/1.73 m 2 Final   Orders Only on 2019   Component Date Value Ref Range Status   • Report 2019    Final                    [facility]    Test Date:  2019  Pt Name:    ALBERTINA MESA               Department: [facility]  MRN:        [id number]                      Room:  Gender:     Female                       Technician: IZZY  :        1945                   Requested By:ROGER DONALD  Order #:    [id number]                    Reading MD: Roger Donald, " MD    Measurements  Intervals                                Axis  Rate:       58                           P:          -19  ID:         [id number]                          QRS:        66  QRSD:       84                           T:          -17  QT:         416  QTc:        409    Interpretive Statements  SINUS BRADYCARDIA  VENTRICULAR PREMATURE COMPLEX  BORDERLINE LOW VOLTAGE IN FRONTAL LEADS  BORDERLINE T ABNORMALITIES, INFERIOR LEADS      Electronically Signed On 6- 16:42:04 PDT by Roger Donald MD     • WBC 06/28/2019 7.9  4.8 - 10.8 K/uL Final   • RBC 06/28/2019 5.07  4.20 - 5.40 M/uL Final   • Hemoglobin 06/28/2019 15.3  12.0 - 16.0 g/dL Final   • Hematocrit 06/28/2019 46.6  37.0 - 47.0 % Final   • MCV 06/28/2019 91.9  81.4 - 97.8 fL Final   • MCH 06/28/2019 30.2  27.0 - 33.0 pg Final   • MCHC 06/28/2019 32.8* 33.6 - 35.0 g/dL Final   • RDW 06/28/2019 46.7  35.9 - 50.0 fL Final   • Platelet Count 06/28/2019 232  164 - 446 K/uL Final   • MPV 06/28/2019 10.8  9.0 - 12.9 fL Final   • Sodium 06/28/2019 139  135 - 145 mmol/L Final   • Potassium 06/28/2019 3.8  3.6 - 5.5 mmol/L Final   • Chloride 06/28/2019 102  96 - 112 mmol/L Final   • Co2 06/28/2019 26  20 - 33 mmol/L Final   • Anion Gap 06/28/2019 11.0  0.0 - 11.9 Final   • Glucose 06/28/2019 104* 65 - 99 mg/dL Final   • Bun 06/28/2019 27* 8 - 22 mg/dL Final   • Creatinine 06/28/2019 1.15  0.50 - 1.40 mg/dL Final   • Calcium 06/28/2019 10.0  8.5 - 10.5 mg/dL Final   • AST(SGOT) 06/28/2019 19  12 - 45 U/L Final   • ALT(SGPT) 06/28/2019 15  2 - 50 U/L Final   • Alkaline Phosphatase 06/28/2019 78  30 - 99 U/L Final   • Total Bilirubin 06/28/2019 0.5  0.1 - 1.5 mg/dL Final   • Albumin 06/28/2019 4.6  3.2 - 4.9 g/dL Final   • Total Protein 06/28/2019 7.4  6.0 - 8.2 g/dL Final   • Globulin 06/28/2019 2.8  1.9 - 3.5 g/dL Final   • A-G Ratio 06/28/2019 1.6  g/dL Final   • PT 06/28/2019 14.0  12.0 - 14.6 sec Final   • INR 06/28/2019 1.05  0.87 - 1.13 Final   •  APTT 2019 30.6  24.7 - 36.0 sec Final   • GFR If  2019 56* >60 mL/min/1.73 m 2 Final   • GFR If Non  2019 46* >60 mL/min/1.73 m 2 Final   Office Visit on 2019   Component Date Value Ref Range Status   • Report 2019    Final                    [facility]    Test Date:  2019  Pt Name:    ALBERTINA MESA               Department: [facility]  MRN:        [id number]                      Room:  Gender:     Female                       Technician:   :        1945                   Requested By:ANNIE THOMAS  Order #:    [id number]                    Reading MD: Kendra Burgos    Measurements  Intervals                                Axis  Rate:       67                           P:          0  NE:         163                          QRS:        42  QRSD:       86                           T:          -21  QT:         384  QTc:        406    Interpretive Statements  SINUS RHYTHM  LOW VOLTAGE IN FRONTAL LEADS  NONSPECIFIC T ABNORMALITIES, DIFFUSE LEADS      Electronically Signed On 2019 8:43:15 PDT by [facility]     [facility] Outpatient Visit on 2019   Component Date Value Ref Range Status   • GFR If  2019 >60  >60 mL/min/1.73 m 2 Final   • GFR If Non  2019 55* >60 mL/min/1.73 m 2 Final   • Magnesium 2019 2.2  1.5 - 2.5 mg/dL Final   • Sodium 2019 141  135 - 145 mmol/L Final   • Potassium 2019 4.4  3.6 - 5.5 mmol/L Final   • Chloride 2019 105  96 - 112 mmol/L Final   • Co2 2019 27  20 - 33 mmol/L Final   • Anion Gap 2019 9.0  0.0 - 11.9 Final   • Glucose 2019 93  65 - 99 mg/dL Final   • Bun 2019 20  8 - 22 mg/dL Final   • Creatinine 2019 0.98  0.50 - 1.40 mg/dL Final   • Calcium 2019 9.4  8.5 - 10.5 mg/dL Final   • AST(SGOT) 2019 15  12 - 45 U/L Final   • ALT(SGPT) 2019 10  2 - 50 U/L Final   •  Alkaline Phosphatase 05/18/2019 78  30 - 99 U/L Final   • Total Bilirubin 05/18/2019 0.6  0.1 - 1.5 mg/dL Final   • Albumin 05/18/2019 4.2  3.2 - 4.9 g/dL Final   • Total Protein 05/18/2019 6.9  6.0 - 8.2 g/dL Final   • Globulin 05/18/2019 2.7  1.9 - 3.5 g/dL Final   • A-G Ratio 05/18/2019 1.6  g/dL Final   • Fasting Status 05/18/2019 Fasting   Final   • Creatinine, Random Urine 05/18/2019 113.90  mg/dL Final   • Total Protein, Urine 05/18/2019 9.1  0.0 - 15.0 mg/dL Final   • WBC 05/18/2019 7.1  4.8 - 10.8 K/uL Final   • RBC 05/18/2019 5.02  4.20 - 5.40 M/uL Final   • Hemoglobin 05/18/2019 14.7  12.0 - 16.0 g/dL Final   • Hematocrit 05/18/2019 47.0  37.0 - 47.0 % Final   • MCV 05/18/2019 93.6  81.4 - 97.8 fL Final   • MCH 05/18/2019 29.3  27.0 - 33.0 pg Final   • MCHC 05/18/2019 31.3* 33.6 - 35.0 g/dL Final   • RDW 05/18/2019 48.7  35.9 - 50.0 fL Final   • Platelet Count 05/18/2019 247  164 - 446 K/uL Final   • MPV 05/18/2019 10.6  9.0 - 12.9 fL Final   • Neutrophils-Polys 05/18/2019 67.10  44.00 - 72.00 % Final   • Lymphocytes 05/18/2019 22.60  22.00 - 41.00 % Final   • Monocytes 05/18/2019 6.00  0.00 - 13.40 % Final   • Eosinophils 05/18/2019 3.10  0.00 - 6.90 % Final   • Basophils 05/18/2019 0.80  0.00 - 1.80 % Final   • Immature Granulocytes 05/18/2019 0.40  0.00 - 0.90 % Final   • Nucleated RBC 05/18/2019 0.00  /100 WBC Final   • Neutrophils (Absolute) 05/18/2019 4.77  2.00 - 7.15 K/uL Final   • Lymphs (Absolute) 05/18/2019 1.61  1.00 - 4.80 K/uL Final   • Monos (Absolute) 05/18/2019 0.43  0.00 - 0.85 K/uL Final   • Eos (Absolute) 05/18/2019 0.22  0.00 - 0.51 K/uL Final   • Baso (Absolute) 05/18/2019 0.06  0.00 - 0.12 K/uL Final   • Immature Granulocytes (abs) 05/18/2019 0.03  0.00 - 0.11 K/uL Final   • NRBC (Absolute) 05/18/2019 0.00  K/uL Final   Hospital Outpatient Visit on 05/18/2019   Component Date Value Ref Range Status   • Sodium 05/18/2019 141  135 - 145 mmol/L Final   • Potassium 05/18/2019 4.3  3.6 -  5.5 mmol/L Final   • Chloride 05/18/2019 105  96 - 112 mmol/L Final   • Co2 05/18/2019 27  20 - 33 mmol/L Final   • Anion Gap 05/18/2019 9.0  0.0 - 11.9 Final   • Glucose 05/18/2019 94  65 - 99 mg/dL Final   • Bun 05/18/2019 20  8 - 22 mg/dL Final   • Creatinine 05/18/2019 1.00  0.50 - 1.40 mg/dL Final   • Calcium 05/18/2019 9.4  8.5 - 10.5 mg/dL Final   • AST(SGOT) 05/18/2019 14  12 - 45 U/L Final   • ALT(SGPT) 05/18/2019 10  2 - 50 U/L Final   • Alkaline Phosphatase 05/18/2019 79  30 - 99 U/L Final   • Total Bilirubin 05/18/2019 0.5  0.1 - 1.5 mg/dL Final   • Albumin 05/18/2019 4.2  3.2 - 4.9 g/dL Final   • Total Protein 05/18/2019 6.7  6.0 - 8.2 g/dL Final   • Globulin 05/18/2019 2.5  1.9 - 3.5 g/dL Final   • A-G Ratio 05/18/2019 1.7  g/dL Final   • Glycohemoglobin 05/18/2019 6.1* 0.0 - 5.6 % Final   • Est Avg Glucose 05/18/2019 128  mg/dL Final   • Fasting Status 05/18/2019 Fasting   Final   • GFR If  05/18/2019 >60  >60 mL/min/1.73 m 2 Final   • GFR If Non  05/18/2019 54* >60 mL/min/1.73 m 2 Final     Assessment and Plan:     1. Precordial chest pain  This is a new complaint to me today. This is closely followed by cardiology. Patient is to continue taking Metoprolol BID as prescribed. We have reviewed her most recent angiogram results. I have reassured her that there is no evidence of CAD at this time.     2. History of pulmonary embolism  3. History of DVT (deep vein thrombosis)  4. Chronic anticoagulation  Chronic and stable on Xarelto 20 mg daily.   - rivaroxaban (XARELTO) 20 MG Tab tablet; Take 1 Tab by mouth every day.  Dispense: 30 Tab; Refill: 11  - Comp Metabolic Panel; Future  - CBC WITH DIFFERENTIAL; Future    5. Essential hypertension  Blood pressure is well-controlled in office today. Continue taking medications as prescribed. Refills given today.   - metoprolol (LOPRESSOR) 25 MG Tab; Take 0.5 Tabs by mouth 2 times a day.  Dispense: 180 Tab; Refill: 3  -  spironolactone (ALDACTONE) 25 MG Tab; Take 0.5 Tabs by mouth every day.  Dispense: 45 Tab; Refill: 3    6. Pure hypercholesterolemia  Stable with dietary efforts. Follow up lipid panel ordered.   - Comp Metabolic Panel; Future  - Lipid Profile; Future    7. Acquired hypothyroidism  Continue thyroid medication.  Instructed patient to take on empty stomach with glass of water, 30 minutes prior to food or other medications.   - levothyroxine (SYNTHROID) 50 MCG Tab; Take 1 Tab by mouth every morning. ON A EMPTY STOMACH  Dispense: 90 Tab; Refill: 3  - TSH; Future    8. CKD (chronic kidney disease) stage 3, GFR 30-59 ml/min ([facility])  Chronic and stable. Labs as indicated. Advised patient to continue to avoid nephrotoxic agents.   - Basic Metabolic Panel; Future  - Comp Metabolic Panel; Future    9. Peripheral polyneuropathy ([facility])  Labs ordered to monitor. Medication refilled today.   - clonazePAM (KLONOPIN) 0.5 MG Tab; Take 0.5 Tabs by mouth 1 time daily as needed (severe neuropathy or anxiety) for up to 30 days.  Dispense: 30 Tab; Refill: 2    10. Prediabetes  Most recent A1C at 6.1% and fasting blood glucose at 100, both consistent with prediabetes. Rechecking A1C.   - HEMOGLOBIN A1C; Future    Patient was seen for 42 minutes face to face of which > 50% of appointment time was spent on counseling and coordination of care regarding the above.    Followup: October          IArden (James), am scribing for, and in the presence of, Maricruz Phelan MD    Electronically signed by: Arden Georges (James), 7/10/2019    Maricruz BREWSTER MD personally performed the services described in this documentation, as scribed by Arden Georges in my presence, and it is both accurate and complete.

## 2019-07-15 ENCOUNTER — PATIENT MESSAGE (OUTPATIENT)
Dept: MEDICAL GROUP | Facility: PHYSICIAN GROUP | Age: 74
End: 2019-07-15

## 2019-07-15 NOTE — PATIENT COMMUNICATION
Phone Number Called:   [facility] [address] [place], [place] - [address] AT [facility] [address] [phone] (Phone)     Message: EDITH requesting pharmacy refill the following:    clonazePAM (KLONOPIN) 0.5 MG Tab [[id number]]   Order Details   Dose: 0.25 mg Route: Oral Frequency: 1 TIME DAILY PRN for severe neuropathy or anxiety   Dispense Quantity:  30 Tab Refills:  2 Fills remaining:  --           Sig: Take 0.5 Tabs by mouth 1 time daily as needed (severe neuropathy or anxiety) for up to 30 days.          Written Date:  07/10/19 Expiration Date:  --     Start Date:  07/10/19 End Date:  08/09/19            Ordering Provider:  -- NORMAN #:  -- NPI:  --    Authorizing Provider:  Maricruz Phelan M.D. NORMAN #:  [id number] NPI:  [id number]    Ordering User:  Maricruz Phelan M.D.            Diagnosis Association: Peripheral polyneuropathy (HCC) (G62.9)      Original Order:  clonazepam (KLONOPIN) 0.5 MG Tab [[id number]]

## 2019-08-15 ENCOUNTER — HOSPITAL ENCOUNTER (OUTPATIENT)
Dept: LAB | Facility: MEDICAL CENTER | Age: 74
End: 2019-08-15
Attending: FAMILY MEDICINE
Payer: MEDICARE

## 2019-08-15 DIAGNOSIS — N18.30 CKD (CHRONIC KIDNEY DISEASE) STAGE 3, GFR 30-59 ML/MIN (HCC): ICD-10-CM

## 2019-08-15 LAB
ANION GAP SERPL CALC-SCNC: 10 MMOL/L (ref 0–11.9)
BUN SERPL-MCNC: 21 MG/DL (ref 8–22)
CALCIUM SERPL-MCNC: 9.6 MG/DL (ref 8.5–10.5)
CHLORIDE SERPL-SCNC: 106 MMOL/L (ref 96–112)
CO2 SERPL-SCNC: 25 MMOL/L (ref 20–33)
CREAT SERPL-MCNC: 0.93 MG/DL (ref 0.5–1.4)
GLUCOSE SERPL-MCNC: 98 MG/DL (ref 65–99)
POTASSIUM SERPL-SCNC: 3.9 MMOL/L (ref 3.6–5.5)
SODIUM SERPL-SCNC: 141 MMOL/L (ref 135–145)

## 2019-08-15 PROCEDURE — 36415 COLL VENOUS BLD VENIPUNCTURE: CPT

## 2019-08-15 PROCEDURE — 80048 BASIC METABOLIC PNL TOTAL CA: CPT

## 2019-08-30 ENCOUNTER — SLEEP CENTER VISIT (OUTPATIENT)
Dept: SLEEP MEDICINE | Facility: MEDICAL CENTER | Age: 74
End: 2019-08-30
Payer: MEDICARE

## 2019-08-30 VITALS
HEIGHT: 62 IN | RESPIRATION RATE: 16 BRPM | OXYGEN SATURATION: 94 % | HEART RATE: 81 BPM | WEIGHT: 153 LBS | SYSTOLIC BLOOD PRESSURE: 140 MMHG | DIASTOLIC BLOOD PRESSURE: 70 MMHG | BODY MASS INDEX: 28.16 KG/M2

## 2019-08-30 DIAGNOSIS — R06.09 DYSPNEA ON EXERTION: ICD-10-CM

## 2019-08-30 DIAGNOSIS — N18.30 CKD (CHRONIC KIDNEY DISEASE) STAGE 3, GFR 30-59 ML/MIN (HCC): ICD-10-CM

## 2019-08-30 DIAGNOSIS — Z86.711 HISTORY OF PULMONARY EMBOLISM: ICD-10-CM

## 2019-08-30 DIAGNOSIS — I27.20 PULMONARY HYPERTENSION (HCC): ICD-10-CM

## 2019-08-30 DIAGNOSIS — I10 ESSENTIAL HYPERTENSION: ICD-10-CM

## 2019-08-30 DIAGNOSIS — Z79.01 CHRONIC ANTICOAGULATION: ICD-10-CM

## 2019-08-30 PROCEDURE — 99214 OFFICE O/P EST MOD 30 MIN: CPT | Performed by: INTERNAL MEDICINE

## 2019-08-30 NOTE — PROGRESS NOTES
CC:  Exertional dyspnea, history of thromboembolic disease.    HPI:   Ms. Peterson was last seen here on May 15, 2017.  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  She is on long-term anticoagulation with rivaroxaban and continues to use that medication daily.  She has had no bleeding complications and denies epistaxis, gum bleeding, melena, hematochezia or discolored urine.  She has had no new leg symptoms to suggest deep venous thrombosis.  She has not required the ongoing use of the compression hose anymore.    Over the past several months she has noticed some increase in exertional dyspnea and a sense of chest pressure with walking.  She could previously walk a mile in about 24 minutes but now requires closer to 40 minutes.  She was evaluated by Dr. Griffin for those symptoms and a cardiac catheterization was done on July 2, 2019.  She did not have significant coronary artery disease and the left ventricular end-diastolic pressure was elevated at 29 mmHg.    She has monitored her pulse oximetry with a home unit and notes that she runs about 95% on room air at rest with the effort of walking may drop transiently to a low of 88%.  She is a probably concerned about the exertional dyspnea.    Her medical history is also notable for chronic kidney disease followed by Dr. Mckeon.        Patient Active Problem List    Diagnosis Date Noted   • Prediabetes 11/07/2018   • History of DVT (deep vein thrombosis) 05/21/2018   • Pure hypercholesterolemia 07/12/2017   • Chronic anticoagulation 07/12/2017   • Peripheral polyneuropathy (HCC) 07/12/2017   • Primary insomnia 11/16/2015   • Acquired hypothyroidism 07/09/2015   • History of gout 05/13/2015   • CKD (chronic kidney disease) stage 3, GFR 30-59 ml/min ([facility]) 05/13/2015   • Essential hypertension 04/06/2015   • History of pulmonary embolism 04/23/2014       Past Medical History:   Diagnosis Date   •  Allergic rhinitis    • Angina of effort (formerly [facility])    • Anxiety    • Arthritis    • Breath shortness     on exertion   • Cataract 06/28/2019    early stages   • DVT (deep venous thrombosis) (formerly [facility]) 2013   • GERD (gastroesophageal reflux disease)    • Gout    • Hiatus hernia syndrome    • Hyperlipidemia    • Hypertension    • Hypoglycemia    • Hypothyroidism    • PE (pulmonary embolism) 2013   • Pneumonia 2005   • Renal insufficiency    • Urinary frequency        Past Surgical History:   Procedure Laterality Date   • HYSTERECTOMY, TOTAL ABDOMINAL  1994   • BASAL CELL EXCISION     • MAMMOPLASTY AUGMENTATION Bilateral 1981/1996   • TONSILLECTOMY         History reviewed. No pertinent family history.    Social History     Socioeconomic History   • Marital status: Single     Spouse name: Not on file   • Number of children: Not on file   • Years of education: Not on file   • Highest education level: Not on file   Occupational History   • Not on file   Social Needs   • Financial resource strain: Not on file   • Food insecurity:     Worry: Not on file     Inability: Not on file   • Transportation needs:     Medical: Not on file     Non-medical: Not on file   Tobacco Use   • Smoking status: Never Smoker   • Smokeless tobacco: Never Used   Substance and Sexual Activity   • Alcohol use: No     Alcohol/week: 0.0 oz   • Drug use: No   • Sexual activity: Not on file   Lifestyle   • Physical activity:     Days per week: Not on file     Minutes per session: Not on file   • Stress: Not on file   Relationships   • Social connections:     Talks on phone: Not on file     Gets together: Not on file     Attends L[employer] service: Not on file     Active member of club or organization: Not on file     Attends meetings of clubs or organizations: Not on file     Relationship status: Not on file   • Intimate partner violence:     Fear of current or ex partner: Not on file     Emotionally abused: Not on file     Physically abused: Not on file      "Forced sexual activity: Not on file   Other Topics Concern   • Not on file   Social History Narrative   • Not on file       Current Outpatient Medications   Medication Sig Dispense Refill   • levothyroxine (SYNTHROID) 50 MCG Tab Take 1 Tab by mouth every morning. ON A EMPTY STOMACH 90 Tab 3   • metoprolol (LOPRESSOR) 25 MG Tab Take 0.5 Tabs by mouth 2 times a day. 180 Tab 3   • spironolactone (ALDACTONE) 25 MG Tab Take 0.5 Tabs by mouth every day. 45 Tab 3   • rivaroxaban (XARELTO) 20 MG Tab tablet Take 1 Tab by mouth every day. 30 Tab 11   • polyethylene glycol/lytes (MIRALAX) Pack Take 17 g by mouth as needed.     • Cyanocobalamin (VITAMIN B-12) 1000 MCG Tab Take 2,000 mcg by mouth every day.     • therapeutic multivitamin-minerals (THERAGRAN-M) Tab Take 1 Tab by mouth every day.     • Cholecalciferol (VITAMIN D) 2000 UNITS Cap Take 1,000 Units by mouth every day.     • acetaminophen (TYLENOL) 325 MG TABS Take 325-650 mg by mouth as needed.     • famotidine (PEPCID) 20 MG TABS Take 10 mg by mouth as needed. For GERD       No current facility-administered medications for this visit.     \"CURRENT RX\"      Allergies: Lisinopril; Nsaids; Prednisone; Pseudoephedrine hcl; Mobic; Ace inhibitors; Ciprofloxacin; and Clindamycin      ROS  Positive for the respiratory, cardiac, renal and thromboembolic issues described above.  All other aspects of the CPT review of systems process are negative and unchanged.      Physical Exam:   /70 (BP Location: Left arm, Patient Position: Sitting, BP Cuff Size: Adult)   Pulse 81   Resp 16   Ht 1.575 m (5' 2\")   Wt 69.4 kg (153 lb)   LMP  (LMP Unknown)   SpO2 94%   BMI 27.98 kg/m²    Head and neck examination demonstrates no mucosal lesion, purulent drainage or evident polyps. The pharynx is benign with a Mallampati I presentation. The neck is supple without thyromegaly. On chest examination there are symmetrical bilateral breath sounds without rales, wheezing or " consolidation. On cardiac examination, the apical impulse and heart sounds are normal and the rhythm is regular. There is no murmur, gallop or rub and no jugular venous distention. The abdomen is soft with active bowel sounds and no palpable hepatosplenomegaly, mass, guarding or rebound. The extremities show no clubbing, cyanosis or edema and no signs of deep venous thrombosis. There is no warmth, redness, tenderness or palpable venous cord in the calves. The skin is clear, warm and dry. There is no unusual peripheral lymphadenopathy. Peripheral pulses are palpable in all 4 extremities. On neurologic examination, cranial nerve function is intact, motor tone is symmetrical, and the patient is alert, oriented and responsive.       Problems:  1. Dyspnea on exertion  She is a lifelong non-smoker without a known history of chronic obstructive pulmonary disease.  Over the past several months she is developed increasing exertional dyspnea and reports a modest drop in arterial oxygen saturation with exercise.  Cardiac catheterization demonstrated clear coronary arteries and preserved left ventricular systolic function but the left ventricular end-diastolic pressure was elevated at 29 mmHg.  This could represent diastolic dysfunction related to age but may also be related to her known pulmonary hypertension in the setting of previous recurrent thromboembolism.  She does not report any symptoms that would suggest recurrence of deep venous thrombosis or pulmonary embolism.    2. History of pulmonary embolism  She remains anticoagulated on rivaroxaban without bleeding complications.    3. Pulmonary hypertension (HCC)  The most recent echocardiogram in April 2014 demonstrated a right ventricular systolic pressure of 35 to 40 mmHg.  That study will be updated to look for any progression and right-sided pressures.    4. Chronic anticoagulation    5. Essential hypertension  Blood pressure is 140/70 mmHg today in a stressful  situation.  She will continue to monitor her blood pressure at home.    6. CKD (chronic kidney disease) stage 3, GFR 30-59 ml/min ([facility])  Followed by Dr. Mckeon.      Plan:   1.  Echocardiogram with Doppler study with particular attention to right-sided pressures of right ventricular size and function.    2.  Pulmonary function study and 6-minute walk test.    3.  Return visit after testing to discuss results.  Pending that review she may continue to walk at a measured pace avoiding severe dyspnea as she does not have significant coronary artery disease in the degree of arterial oxygen desaturation is modest.    We appreciate the opportunity to assist in her care.  Return in about 3 weeks (around 9/20/2019), or with me in September.

## 2019-09-10 ENCOUNTER — NON-PROVIDER VISIT (OUTPATIENT)
Dept: PULMONOLOGY | Facility: HOSPICE | Age: 74
End: 2019-09-10
Attending: INTERNAL MEDICINE
Payer: MEDICARE

## 2019-09-10 VITALS — BODY MASS INDEX: 27.98 KG/M2 | WEIGHT: 153 LBS

## 2019-09-10 DIAGNOSIS — R06.09 DYSPNEA ON EXERTION: ICD-10-CM

## 2019-09-10 PROCEDURE — 94618 PULMONARY STRESS TESTING: CPT | Performed by: INTERNAL MEDICINE

## 2019-09-10 PROCEDURE — 94726 PLETHYSMOGRAPHY LUNG VOLUMES: CPT | Performed by: INTERNAL MEDICINE

## 2019-09-10 PROCEDURE — 94060 EVALUATION OF WHEEZING: CPT | Performed by: INTERNAL MEDICINE

## 2019-09-10 PROCEDURE — 94729 DIFFUSING CAPACITY: CPT | Performed by: INTERNAL MEDICINE

## 2019-09-10 ASSESSMENT — 6 MINUTE WALK TEST (6MWT)
SAO2 AT 5 MIN: 93
TOTAL REST TIME: 0
PERCEIVED FATIGUE AT 2 MIN: 2
HEART RATE AT 2 MIN: 80
PERCEIVED BREATHLESSNESS AT 4 MIN: 3
HEART RATE AT 3 MIN: 101
PERCEIVED BREATHLESSNESS AT 1 MIN: 0.5
PERCEIVED BREATHLESSNESS AT 3 MIN: 2
HEART RATE AT 1 MIN: 79
PERCEIVED BREATHLESSNESS AT 2 MIN: 0
HEART RATE: 71
HEART RATE AT 2 MIN: 101
PERCEIVED FATIGUE AT 1 MIN: 0
SAO2 AT 1 MIN: 94
HEART RATE AT 5 MIN: 99
SAO2 AT 1 MIN: 93
PERCEIVED FATIGUE AT 4 MIN: 2
PERCEIVED FATIGUE AT 5 MIN: 2
PERCEIVED FATIGUE AT 3 MIN: 2
HEART RATE AT 4 MIN: 104
SAO2 AT 2 MIN: 97
AMBULATES WITH O2: WITHOUT O2
HEART RATE AT 1 MIN: 87
NUMBER OF RESTS: 0
BLOOD PRESSURE AT 2 MIN: 135/75
SAO2 AT 6 MIN: 94
PERCEIVED FATIGUE AT 1 MIN: 1
BLOOD PRESSURE: LEFT ARM
PERCEIVED BREATHLESSNESS AT 6 MIN: 3
PERCEIVED BREATHLESSNESS AT 1 MIN: 0
SAO2 AT 2 MIN: 92
SAO2 AT 4 MIN: 95
O2 SAT PERCENT ROOM AIR: 93
BLOOD PRESSURE AT 1 MIN: 135/75
PERCEIVED FATIGUE AT 2 MIN: 0
PERCEIVED BREATHLESSNESS AT 2 MIN: 1
SITTING BLOOD PRESSURE: 140/80
SAO2 AT 3 MIN: 94
PERCENT OF NORMAL WALKED: 106
PERCEIVED FATIGUE AT 6 MIN: 2
PERCEIVED BREATHLESSNESS AT 5 MIN: 3
HEART RATE AT 6 MIN: 103

## 2019-09-10 ASSESSMENT — PULMONARY FUNCTION TESTS
FEV1/FVC_PERCENT_PREDICTED: 99
FEV1/FVC: 75
FEV1_PERCENT_CHANGE: 5
FEV1/FVC: 74.33
FEV1_PREDICTED: 1.88
FEV1_LLN: 1.57
FEV1/FVC_PERCENT_CHANGE: 100
FEV1/FVC_PREDICTED: 75
FVC_LLN: 2.10
FEV1/FVC_PERCENT_PREDICTED: 100
FEV1/FVC_PERCENT_CHANGE: 0
FEV1_PERCENT_PREDICTED: 73
FEV1/FVC_PERCENT_PREDICTED: 99
FEV1/FVC_PERCENT_PREDICTED: 75
FVC_PERCENT_PREDICTED: 69
FVC: 1.87
FVC: 1.76
FEV1_PERCENT_CHANGE: 5
FEV1: 1.31
FEV1/FVC: 74
FVC_PREDICTED: 2.52
FVC_PERCENT_PREDICTED: 74
FEV1_PERCENT_PREDICTED: 69
FEV1: 1.39
FEV1/FVC_PERCENT_PREDICTED: 99
FEV1/FVC: 74
FEV1/FVC_PERCENT_LLN: 63

## 2019-09-10 NOTE — PROCEDURES
Pt started and ended 6MWT on RA. Total distance walked at 1,400ft w/ no stops.    Baseline oxygen saturation was 93% on room air.  During the study there was no evidence of oxygen desaturation.  There was no evidence of tachycardia.  Patient was able to walk 1400 feet which was 106% of predicted distance.

## 2019-09-10 NOTE — PROCEDURES
Good patient effort & cooperation.  The results of this test meet the ATS/ERS standards for acceptability and repeatability.  Predicted equations for Spirometry are N-Jean II, ITS for lung volumes, and [facility] for DLCO.  The DLCO was uncorrected for Hgb.  A bronchodilator of Xopenex HFA- 2puffs via spacer were administered.  DLCO was performed during dilation period.    The FVC is 1.76 L or 69%, FEV1 is 1.31 L of 69%, FEV1/FVC: 75%.  Total lung capacity: 92%.  DLCO: 87%.  No significant bronchodilator response by ATS guidelines.    Interpretation:  PFT show moderately severe obstructive ventilatory defect.  Normal gas transfer.  Lack of bronchodilator response does not preclude using inhalers when clinically indicated.

## 2019-09-12 ENCOUNTER — HOSPITAL ENCOUNTER (OUTPATIENT)
Dept: CARDIOLOGY | Facility: MEDICAL CENTER | Age: 74
End: 2019-09-12
Attending: INTERNAL MEDICINE
Payer: MEDICARE

## 2019-09-12 DIAGNOSIS — I27.20 PULMONARY HYPERTENSION (HCC): ICD-10-CM

## 2019-09-12 LAB
LV EJECT FRACT  99904: 60
LV EJECT FRACT MOD 2C 99903: 80.79
LV EJECT FRACT MOD 4C 99902: 76.09
LV EJECT FRACT MOD BP 99901: 78.39

## 2019-09-12 PROCEDURE — 93306 TTE W/DOPPLER COMPLETE: CPT | Mod: 26 | Performed by: INTERNAL MEDICINE

## 2019-09-12 PROCEDURE — 93306 TTE W/DOPPLER COMPLETE: CPT

## 2019-10-07 ENCOUNTER — SLEEP CENTER VISIT (OUTPATIENT)
Dept: SLEEP MEDICINE | Facility: MEDICAL CENTER | Age: 74
End: 2019-10-07
Payer: MEDICARE

## 2019-10-07 VITALS
RESPIRATION RATE: 14 BRPM | WEIGHT: 152 LBS | HEART RATE: 74 BPM | DIASTOLIC BLOOD PRESSURE: 74 MMHG | BODY MASS INDEX: 27.97 KG/M2 | OXYGEN SATURATION: 94 % | SYSTOLIC BLOOD PRESSURE: 130 MMHG | HEIGHT: 62 IN

## 2019-10-07 DIAGNOSIS — J45.20 MILD INTERMITTENT ASTHMA WITHOUT COMPLICATION: ICD-10-CM

## 2019-10-07 DIAGNOSIS — R06.09 DYSPNEA ON EXERTION: ICD-10-CM

## 2019-10-07 DIAGNOSIS — Z86.711 HISTORY OF PULMONARY EMBOLISM: ICD-10-CM

## 2019-10-07 DIAGNOSIS — N18.30 CKD (CHRONIC KIDNEY DISEASE) STAGE 3, GFR 30-59 ML/MIN (HCC): ICD-10-CM

## 2019-10-07 DIAGNOSIS — I27.20 PULMONARY HYPERTENSION (HCC): ICD-10-CM

## 2019-10-07 DIAGNOSIS — I10 HYPERTENSION, UNSPECIFIED TYPE: ICD-10-CM

## 2019-10-07 PROCEDURE — 99214 OFFICE O/P EST MOD 30 MIN: CPT | Performed by: INTERNAL MEDICINE

## 2019-10-07 NOTE — PROGRESS NOTES
CC:  Exertional dyspnea, history of thromboembolic disease.     HPI:   Ms. Peterson was last seen here on August 30, 2019.  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  She is on long-term anticoagulation with rivaroxaban and continues to use that medication daily.  She has had no bleeding complications and denies epistaxis, gum bleeding, melena, hematochezia or discolored urine.  She has had no new leg symptoms to suggest deep venous thrombosis.  She has not required the ongoing use of the compression hose anymore.     At the August 30, 2019 visit she described some increase in exertional dyspnea and a sense of chest pressure with walking.  She could previously walk a mile in about 24 minutes but then required closer to 40 minutes.  She was evaluated by Dr. Griffin for those symptoms and a cardiac catheterization was done on July 2, 2019.  She did not have significant coronary artery disease and the left ventricular end-diastolic pressure was elevated at 29 mmHg.     She has monitored her pulse oximetry with a home unit and notes that she runs about 95% on room air at rest with the effort of walking may drop transiently to a low of 88%.  She was appropriately concerned about the exertional dyspnea.  A pulmonary function study and updated echocardiogram were requested.  Over the last month she has resumed regular use of spironolactone and is no longer experiencing the exertional dyspnea.     Her medical history is also notable for chronic kidney disease followed by Dr. Mckeon.    The pulmonary function study on September 10, 2019 demonstrates an FEV1 to FVC ratio 75% and an FEV1 of 1.31 L or 6 9% of the predicted value with no significant improvement after bronchodilator.  Total lung capacity and diffusing capacity were normal.  The 6-minute walk test demonstrated preservation of arterial oxygen saturation and she was able to walk 1400 feet or  106% of the predicted distance.  The echocardiogram on September 12, 2019 demonstrated preserved left ventricular systolic function with an ejection fraction of 60%, mild aortic insufficiency and a trace of tricuspid regurgitation.  The estimated right ventricular systolic pressure was 28 mmHg.  The right ventricle seem to be normal in size and systolic function.        Patient Active Problem List    Diagnosis Date Noted   • Prediabetes 11/07/2018   • History of DVT (deep vein thrombosis) 05/21/2018   • Pure hypercholesterolemia 07/12/2017   • Chronic anticoagulation 07/12/2017   • Peripheral polyneuropathy (HCC) 07/12/2017   • Primary insomnia 11/16/2015   • Acquired hypothyroidism 07/09/2015   • History of gout 05/13/2015   • CKD (chronic kidney disease) stage 3, GFR 30-59 ml/min ([facility]) 05/13/2015   • Essential hypertension 04/06/2015   • History of pulmonary embolism 04/23/2014       Past Medical History:   Diagnosis Date   • Allergic rhinitis    • Angina of effort ([facility])    • Anxiety    • Arthritis    • Breath shortness     on exertion   • Cataract 06/28/2019    early stages   • DVT (deep venous thrombosis) ([facility]) 2013   • GERD (gastroesophageal reflux disease)    • Gout    • Hiatus hernia syndrome    • Hyperlipidemia    • Hypertension    • Hypoglycemia    • Hypothyroidism    • PE (pulmonary embolism) 2013   • Pneumonia 2005   • Renal insufficiency    • Urinary frequency        Past Surgical History:   Procedure Laterality Date   • HYSTERECTOMY, TOTAL ABDOMINAL  1994   • BASAL CELL EXCISION     • MAMMOPLASTY AUGMENTATION Bilateral 1981/1996   • TONSILLECTOMY         History reviewed. No pertinent family history.    Social History     Socioeconomic History   • Marital status: Single     Spouse name: Not on file   • Number of children: Not on file   • Years of education: Not on file   • Highest education level: Not on file   Occupational History   • Not on file   Social Needs   • Financial resource strain: Not on  file   • Food insecurity:     Worry: Not on file     Inability: Not on file   • Transportation needs:     Medical: Not on file     Non-medical: Not on file   Tobacco Use   • Smoking status: Never Smoker   • Smokeless tobacco: Never Used   Substance and Sexual Activity   • Alcohol use: No     Alcohol/week: 0.0 oz   • Drug use: No   • Sexual activity: Not on file   Lifestyle   • Physical activity:     Days per week: Not on file     Minutes per session: Not on file   • Stress: Not on file   Relationships   • Social connections:     Talks on phone: Not on file     Gets together: Not on file     Attends Confucianism service: Not on file     Active member of club or organization: Not on file     Attends meetings of clubs or organizations: Not on file     Relationship status: Not on file   • Intimate partner violence:     Fear of current or ex partner: Not on file     Emotionally abused: Not on file     Physically abused: Not on file     Forced sexual activity: Not on file   Other Topics Concern   • Not on file   Social History Narrative   • Not on file       Current Outpatient Medications   Medication Sig Dispense Refill   • levothyroxine (SYNTHROID) 50 MCG Tab Take 1 Tab by mouth every morning. ON A EMPTY STOMACH 90 Tab 3   • metoprolol (LOPRESSOR) 25 MG Tab Take 0.5 Tabs by mouth 2 times a day. 180 Tab 3   • spironolactone (ALDACTONE) 25 MG Tab Take 0.5 Tabs by mouth every day. 45 Tab 3   • rivaroxaban (XARELTO) 20 MG Tab tablet Take 1 Tab by mouth every day. 30 Tab 11   • polyethylene glycol/lytes (MIRALAX) Pack Take 17 g by mouth as needed.     • Cyanocobalamin (VITAMIN B-12) 1000 MCG Tab Take 2,000 mcg by mouth every day.     • therapeutic multivitamin-minerals (THERAGRAN-M) Tab Take 1 Tab by mouth every day.     • Cholecalciferol (VITAMIN D) 2000 UNITS Cap Take 1,000 Units by mouth every day.     • acetaminophen (TYLENOL) 325 MG TABS Take 325-650 mg by mouth as needed.     • famotidine (PEPCID) 20 MG TABS Take 10 mg by  "mouth as needed. For GERD       No current facility-administered medications for this visit.     \"CURRENT RX\"      Allergies: Lisinopril; Nsaids; Prednisone; Pseudoephedrine hcl; Mobic; Ace inhibitors; Ciprofloxacin; and Clindamycin      ROS  Positive for the respiratory, cardiac, renal and thromboembolic issues described above.  All other aspects of the CPT review of systems process are negative and unchanged.      Physical Exam:   /74 (BP Location: Left arm, Patient Position: Sitting, BP Cuff Size: Adult)   Pulse 74   Resp 14   Ht 1.575 m (5' 2\")   Wt 68.9 kg (152 lb)   LMP  (LMP Unknown)   SpO2 94%   BMI 27.80 kg/m²    Head and neck examination demonstrates no mucosal lesion, purulent drainage or evident polyps. The pharynx is benign with a Mallampati I presentation. The neck is supple without thyromegaly. On chest examination there are symmetrical bilateral breath sounds without rales, wheezing or consolidation. On cardiac examination, the apical impulse and heart sounds are normal and the rhythm is regular. There is no murmur, gallop or rub and no jugular venous distention. The abdomen is soft with active bowel sounds and no palpable hepatosplenomegaly, mass, guarding or rebound. The extremities show no clubbing, cyanosis or edema and no signs of deep venous thrombosis. There is no warmth, redness, tenderness or palpable venous cord in the calves. The skin is clear, warm and dry. There is no unusual peripheral lymphadenopathy. Peripheral pulses are palpable in all 4 extremities. On neurologic examination, cranial nerve function is intact, motor tone is symmetrical, and the patient is alert, oriented and responsive.       Problems:  1. Dyspnea on exertion  She is a lifelong non-smoker without a known history of chronic obstructive pulmonary disease.  Over several months she developed increasing exertional dyspnea.  Cardiac catheterization demonstrated clear coronary arteries and preserved left " ventricular systolic function but the left ventricular end-diastolic pressure was elevated at 29 mmHg.    The cardiologist suggested that this could represent diastolic dysfunction has improved over the last several weeks, perhaps being part with the reintroduction of daily spironolactone.  Her updated echocardiogram suggests preserved left ventricular systolic function and does not suggest progressive pulmonary hypertension.  The pulmonary function study does suggest mild obstructive airways disease.  The FEV1 of 1.31 L is lower than the 1.72 L recorded on the previous pulmonary function study  in 2013.  She does not report any symptoms that would suggest recurrence of deep venous thrombosis or pulmonary embolism.    2. History of pulmonary embolism  She remains anticoagulated on rivaroxaban without clinical evidence for recurrent thromboembolism and without bleeding complications.    3. Pulmonary hypertension (HCC)  The most recent echocardiogram in April 2014 demonstrated a right ventricular systolic pressure of 35 to 40 mmHg.  The updated echocardiogram suggests a right ventricular systolic pressure of 28 mmHg.    4. Hypertension, unspecified type  Controlled with blood pressure 130/74 mmHg today.    5. CKD (chronic kidney disease) stage 3, GFR 30-59 ml/min ([facility])  Followed by Dr. Mckeon.      Plan:   1.  Continue rivaroxaban and a daily spironolactone.    2.  Albuterol inhaler to use it 2 puffs up to every 4 hours as needed.    3.  Follow-up here in 6 months or sooner if new problems develop.  Follow-up with Dr. Phelan as scheduled.    We appreciate the opportunity to assist in her care.

## 2019-10-11 ENCOUNTER — HOSPITAL ENCOUNTER (OUTPATIENT)
Dept: LAB | Facility: MEDICAL CENTER | Age: 74
End: 2019-10-11
Attending: NURSE PRACTITIONER
Payer: MEDICARE

## 2019-10-11 ENCOUNTER — HOSPITAL ENCOUNTER (OUTPATIENT)
Dept: LAB | Facility: MEDICAL CENTER | Age: 74
End: 2019-10-11
Attending: FAMILY MEDICINE
Payer: MEDICARE

## 2019-10-11 DIAGNOSIS — E03.9 ACQUIRED HYPOTHYROIDISM: ICD-10-CM

## 2019-10-11 DIAGNOSIS — E78.00 PURE HYPERCHOLESTEROLEMIA: ICD-10-CM

## 2019-10-11 DIAGNOSIS — Z79.01 CHRONIC ANTICOAGULATION: ICD-10-CM

## 2019-10-11 DIAGNOSIS — R73.03 PREDIABETES: ICD-10-CM

## 2019-10-11 DIAGNOSIS — Z86.711 HISTORY OF PULMONARY EMBOLISM: ICD-10-CM

## 2019-10-11 DIAGNOSIS — I10 ESSENTIAL HYPERTENSION: ICD-10-CM

## 2019-10-11 DIAGNOSIS — Z86.718 HISTORY OF DVT (DEEP VEIN THROMBOSIS): ICD-10-CM

## 2019-10-11 DIAGNOSIS — N18.30 CKD (CHRONIC KIDNEY DISEASE) STAGE 3, GFR 30-59 ML/MIN (HCC): ICD-10-CM

## 2019-10-11 LAB
ALBUMIN SERPL BCP-MCNC: 4.4 G/DL (ref 3.2–4.9)
ALBUMIN SERPL BCP-MCNC: 4.5 G/DL (ref 3.2–4.9)
ALBUMIN/GLOB SERPL: 1.8 G/DL
ALP SERPL-CCNC: 73 U/L (ref 30–99)
ALT SERPL-CCNC: 11 U/L (ref 2–50)
ANION GAP SERPL CALC-SCNC: 11 MMOL/L (ref 0–11.9)
AST SERPL-CCNC: 17 U/L (ref 12–45)
BASOPHILS # BLD AUTO: 1 % (ref 0–1.8)
BASOPHILS # BLD: 0.05 K/UL (ref 0–0.12)
BILIRUB SERPL-MCNC: 0.6 MG/DL (ref 0.1–1.5)
BUN SERPL-MCNC: 22 MG/DL (ref 8–22)
BUN SERPL-MCNC: 23 MG/DL (ref 8–22)
CALCIUM SERPL-MCNC: 9.6 MG/DL (ref 8.5–10.5)
CALCIUM SERPL-MCNC: 9.8 MG/DL (ref 8.5–10.5)
CHLORIDE SERPL-SCNC: 105 MMOL/L (ref 96–112)
CHLORIDE SERPL-SCNC: 106 MMOL/L (ref 96–112)
CHOLEST SERPL-MCNC: 246 MG/DL (ref 100–199)
CO2 SERPL-SCNC: 25 MMOL/L (ref 20–33)
CO2 SERPL-SCNC: 27 MMOL/L (ref 20–33)
CREAT SERPL-MCNC: 0.88 MG/DL (ref 0.5–1.4)
CREAT SERPL-MCNC: 0.88 MG/DL (ref 0.5–1.4)
EOSINOPHIL # BLD AUTO: 0.18 K/UL (ref 0–0.51)
EOSINOPHIL NFR BLD: 3.4 % (ref 0–6.9)
ERYTHROCYTE [DISTWIDTH] IN BLOOD BY AUTOMATED COUNT: 49.2 FL (ref 35.9–50)
EST. AVERAGE GLUCOSE BLD GHB EST-MCNC: 123 MG/DL
FASTING STATUS PATIENT QL REPORTED: NORMAL
GLOBULIN SER CALC-MCNC: 2.5 G/DL (ref 1.9–3.5)
GLUCOSE SERPL-MCNC: 101 MG/DL (ref 65–99)
GLUCOSE SERPL-MCNC: 101 MG/DL (ref 65–99)
HBA1C MFR BLD: 5.9 % (ref 0–5.6)
HCT VFR BLD AUTO: 46.7 % (ref 37–47)
HDLC SERPL-MCNC: 47 MG/DL
HGB BLD-MCNC: 15.3 G/DL (ref 12–16)
IMM GRANULOCYTES # BLD AUTO: 0.01 K/UL (ref 0–0.11)
IMM GRANULOCYTES NFR BLD AUTO: 0.2 % (ref 0–0.9)
LDLC SERPL CALC-MCNC: 147 MG/DL
LYMPHOCYTES # BLD AUTO: 1.87 K/UL (ref 1–4.8)
LYMPHOCYTES NFR BLD: 35.6 % (ref 22–41)
MCH RBC QN AUTO: 30.5 PG (ref 27–33)
MCHC RBC AUTO-ENTMCNC: 32.8 G/DL (ref 33.6–35)
MCV RBC AUTO: 93 FL (ref 81.4–97.8)
MONOCYTES # BLD AUTO: 0.39 K/UL (ref 0–0.85)
MONOCYTES NFR BLD AUTO: 7.4 % (ref 0–13.4)
NEUTROPHILS # BLD AUTO: 2.76 K/UL (ref 2–7.15)
NEUTROPHILS NFR BLD: 52.4 % (ref 44–72)
NRBC # BLD AUTO: 0 K/UL
NRBC BLD-RTO: 0 /100 WBC
PHOSPHATE SERPL-MCNC: 3.1 MG/DL (ref 2.5–4.5)
PLATELET # BLD AUTO: 229 K/UL (ref 164–446)
PMV BLD AUTO: 11.2 FL (ref 9–12.9)
POTASSIUM SERPL-SCNC: 4 MMOL/L (ref 3.6–5.5)
POTASSIUM SERPL-SCNC: 4.1 MMOL/L (ref 3.6–5.5)
PROT SERPL-MCNC: 7 G/DL (ref 6–8.2)
RBC # BLD AUTO: 5.02 M/UL (ref 4.2–5.4)
SODIUM SERPL-SCNC: 141 MMOL/L (ref 135–145)
SODIUM SERPL-SCNC: 143 MMOL/L (ref 135–145)
TRIGL SERPL-MCNC: 259 MG/DL (ref 0–149)
TSH SERPL DL<=0.005 MIU/L-ACNC: 0.89 UIU/ML (ref 0.38–5.33)
WBC # BLD AUTO: 5.3 K/UL (ref 4.8–10.8)

## 2019-10-11 PROCEDURE — 83036 HEMOGLOBIN GLYCOSYLATED A1C: CPT | Mod: GA

## 2019-10-11 PROCEDURE — 36415 COLL VENOUS BLD VENIPUNCTURE: CPT

## 2019-10-11 PROCEDURE — 84443 ASSAY THYROID STIM HORMONE: CPT

## 2019-10-11 PROCEDURE — 80069 RENAL FUNCTION PANEL: CPT

## 2019-10-11 PROCEDURE — 85025 COMPLETE CBC W/AUTO DIFF WBC: CPT

## 2019-10-11 PROCEDURE — 80053 COMPREHEN METABOLIC PANEL: CPT

## 2019-10-11 PROCEDURE — 80061 LIPID PANEL: CPT

## 2019-10-23 ENCOUNTER — OFFICE VISIT (OUTPATIENT)
Dept: MEDICAL GROUP | Facility: PHYSICIAN GROUP | Age: 74
End: 2019-10-23
Payer: MEDICARE

## 2019-10-23 VITALS
HEART RATE: 75 BPM | HEIGHT: 62 IN | TEMPERATURE: 97.8 F | OXYGEN SATURATION: 96 % | SYSTOLIC BLOOD PRESSURE: 118 MMHG | BODY MASS INDEX: 27.79 KG/M2 | RESPIRATION RATE: 16 BRPM | WEIGHT: 151 LBS | DIASTOLIC BLOOD PRESSURE: 72 MMHG

## 2019-10-23 DIAGNOSIS — E55.9 VITAMIN D DEFICIENCY: ICD-10-CM

## 2019-10-23 DIAGNOSIS — E78.00 PURE HYPERCHOLESTEROLEMIA: ICD-10-CM

## 2019-10-23 DIAGNOSIS — E03.9 ACQUIRED HYPOTHYROIDISM: ICD-10-CM

## 2019-10-23 DIAGNOSIS — Z86.711 HISTORY OF PULMONARY EMBOLISM: ICD-10-CM

## 2019-10-23 DIAGNOSIS — N18.2 STAGE 2 CHRONIC KIDNEY DISEASE: ICD-10-CM

## 2019-10-23 DIAGNOSIS — Z23 NEED FOR VACCINATION: ICD-10-CM

## 2019-10-23 DIAGNOSIS — R73.03 PREDIABETES: ICD-10-CM

## 2019-10-23 DIAGNOSIS — Z86.718 HISTORY OF DVT (DEEP VEIN THROMBOSIS): ICD-10-CM

## 2019-10-23 DIAGNOSIS — I10 ESSENTIAL HYPERTENSION: ICD-10-CM

## 2019-10-23 DIAGNOSIS — K44.9 HIATAL HERNIA: ICD-10-CM

## 2019-10-23 PROCEDURE — 99214 OFFICE O/P EST MOD 30 MIN: CPT | Mod: 25 | Performed by: FAMILY MEDICINE

## 2019-10-23 PROCEDURE — G0008 ADMIN INFLUENZA VIRUS VAC: HCPCS | Performed by: FAMILY MEDICINE

## 2019-10-23 PROCEDURE — 90662 IIV NO PRSV INCREASED AG IM: CPT | Performed by: FAMILY MEDICINE

## 2019-10-23 RX ORDER — CLONAZEPAM 0.5 MG/1
1 TABLET ORAL DAILY
Refills: 2 | COMMUNITY
Start: 2019-09-19 | End: 2021-03-25

## 2019-10-23 NOTE — PROGRESS NOTES
Subjective:   Sandi Peterson is a 74 y.o. female here today for follow-up hypertension, other chronic medical conditions and lab results.    Essential hypertension  Patient has a history of chronic hypertension and is taking Metoprolol 25 mg once daily and half a tablet of Spironolactone 25 mg daily. No medication side effects were reported. She reportedly monitors her blood pressure regularly at home. Blood pressure is stable today at 118/72.  She reports following a low sodium diet and denies any related complaints including chronic cough, chest pain, headaches or dizziness.     Pure hypercholesterolemia  The patient has a chronic history of hypercholesterolemia which is managed with dietary modification. She is not on a statin or a daily Aspirin at this time. Lipid panel was last completed on 10/11/19.    Stage 2 chronic kidney disease  The patient has a prior history of stage III kidney disease which has improved to stage II based on her recent labs. She has reportedly increased her water intake. She is followed by Dr. Mckeon, Nephrology. She denies any acute complaints.     Acquired hypothyroidism  Patient has a history of hypothyroidism and is taking Levothyroxine 50 mcg once every morning on an empty stomach. She denies any medication side effects or acute complaints including palpitations, skin changes, temperature intolerance or changes in bowel habits.    Prediabetes  The patient has a history of prediabetes which is managed via dietary modification. Weight has been stable this year. She is not on medications at this time.    History of pulmonary embolism  History of DVT (deep vein thrombosis)  The patient has a chronic history of a pulmonary embolism and deep venous thrombosis. She is chronically anticoagulated with Xarelto 20 mg daily. She denies any medication side effects or acute complaints of active bleeding, hematochezia or melena. She is followed by Dr. Griffin, Cardiology and Dr. Luna,  Pulmonology.    Vitamin D deficiency  The patient is monitored for a vitamin D deficiency and is reportedly taking daily supplementation.    Hiatal hernia  The patient was diagnosed with a hiatal hernia approximately 20 years ago. At that time, the hernia was relatively small in size and has gradually increased. She does wish to proceed with surgical intervention at this time.     Current medicines (including changes today)  Current Outpatient Medications   Medication Sig Dispense Refill   • clonazePAM (KLONOPIN) 0.5 MG Tab Take 1 Tab by mouth every day.  2   • Albuterol Sulfate (PROAIR RESPICLICK) 108 (90 Base) MCG/ACT AEROSOL POWDER, BREATH ACTIVATED Inhale 2 Puffs by mouth 4 times a day as needed. 1 Each 2   • levothyroxine (SYNTHROID) 50 MCG Tab Take 1 Tab by mouth every morning. ON A EMPTY STOMACH 90 Tab 3   • metoprolol (LOPRESSOR) 25 MG Tab Take 0.5 Tabs by mouth 2 times a day. 180 Tab 3   • spironolactone (ALDACTONE) 25 MG Tab Take 0.5 Tabs by mouth every day. 45 Tab 3   • rivaroxaban (XARELTO) 20 MG Tab tablet Take 1 Tab by mouth every day. 30 Tab 11   • polyethylene glycol/lytes (MIRALAX) Pack Take 17 g by mouth as needed.     • Cyanocobalamin (VITAMIN B-12) 1000 MCG Tab Take 2,000 mcg by mouth every day.     • therapeutic multivitamin-minerals (THERAGRAN-M) Tab Take 1 Tab by mouth every day.     • Cholecalciferol (VITAMIN D) 2000 UNITS Cap Take 1,000 Units by mouth every day.     • acetaminophen (TYLENOL) 325 MG TABS Take 325-650 mg by mouth as needed.     • famotidine (PEPCID) 20 MG TABS Take 10 mg by mouth as needed. For GERD       No current facility-administered medications for this visit.      She  has a past medical history of Allergic rhinitis, Angina of effort ([facility]), Anxiety, Arthritis, Breath shortness, Cataract (06/28/2019), DVT (deep venous thrombosis) ([facility]) (2013), GERD (gastroesophageal reflux disease), Gout, Hiatal hernia, Hiatus hernia syndrome, Hyperlipidemia, Hypertension, Hypoglycemia,  "Hypothyroidism, PE (pulmonary embolism) (2013), Pneumonia (2005), Renal insufficiency, and Urinary frequency.    ROS   No chest pain, shortness of breath or abdominal pain.     Objective:     Physical Exam:  /72   Pulse 75   Temp 36.6 °C (97.8 °F)   Resp 16   Ht 1.575 m (5' 2\")   Wt 68.5 kg (151 lb)   SpO2 96%  Body mass index is 27.62 kg/m².   Constitutional: Alert, no distress, well-groomed.  Skin: Warm, dry, good turgor, no rashes in visible areas.  Eye: Equal, round and reactive, conjunctiva clear, lids normal.  ENMT: Lips without lesions, good dentition, moist mucous membranes.  Neck: Trachea midline, no masses, no thyromegaly.  Respiratory: Unlabored respiratory effort, no cough.  MSK: Normal gait, moves all extremities.  Neuro: Grossly non-focal. No cranial nerve deficit. Strength and sensation intact.   Psych: Alert and oriented x3, normal affect and mood.    Assessment and Plan:     1. Essential hypertension  Chronic, stable history. Under good control with current medication regimen: Metoprolol 25 mg once daily and half a tablet of Spironolactone 25 mg daily. No changes in dosage today. Blood pressure today was stable at 118/72. Patient was asked to continue monitoring her blood pressure at home. She was encouraged to follow a low sodium diet.     2. Pure hypercholesterolemia  Chronic history. Reviewed recent lipid panel dated 10/11/19 revealing an elevated total cholesterol, triglycerides and LDL. Plan to continue conservative treatment with dietary modification and exercise. Patient will not be started on a statin at this time. Recommended she follow low fat, low carbohydrate and high fiber diet. Additionally, patient was asked to exercise regularly including frequent cardio.    3. Stage 2 chronic kidney disease  Chronic history. Improved kidney function with a GFR >60. Patient was encouraged to continue staying hydrated with plenty of fluids throughout the day. She was asked to avoid " NSAIDs.  - VITAMIN D,25 HYDROXY; Future    4. Acquired hypothyroidism  Chronic, stable history. Well controlled with current medication of Levothyroxine 50 mcg once daily taken on an empty stomach. Patient understands she must wait at least 30 minutes prior to eating or drinking coffee after taking the medication.  - Thyroid testing was last completed on 10/11/19 revealing a normal TSH at 0.890.    5. Prediabetes  Chronic, stable history. A1c was 5.9 on recent labs dated 10/11/19. Plan to continue monitoring. She was encouraged to follow a high protein, low fat, low carbohydrate diet. Additionally, recommended she exercise and add cardio as tolerated.    6. History of pulmonary embolism  7. History of DVT (deep vein thrombosis)  Chronic, stable history. Followed by Dr. Griffin, Cardiology. Plan to continue her current medication regimen of Xarelto 20 mg once daily. She is on lifelong anticoagulation. No acute complaints.     8. Vitamin D deficiency  Plan to continue Cholecalciferol 2,000 units daily. We will continue to monitor her vitamin D level.  - VITAMIN D,25 HYDROXY; Future    9. Hiatal hernia  Chronic history; diagnosed 20 years ago. Plan to continue monitoring. Patient does not wish to proceed with further testing or evaluation for surgical repair at this time.    10. Need for vaccination  Influenza vaccine was administered in the clinic today after risks and benefits were discussed.  - INFLUENZA VACCINE, HIGH DOSE (65+ ONLY)    Follow up: Return in about 23 weeks (around 4/1/2020) for routine follow-up, short.         Lisa BREWSTER (Scribe), am scribing for, and in the presence of, Maricruz Phelan MD    Electronically signed by: Lisa Cueva (Scribe), 10/23/2019    IMaricruz MD personally performed the services described in this documentation, as scribed by Lisa Cueva in my presence, and it is both accurate and complete.

## 2020-02-26 ENCOUNTER — OFFICE VISIT (OUTPATIENT)
Dept: MEDICAL GROUP | Facility: PHYSICIAN GROUP | Age: 75
End: 2020-02-26
Payer: MEDICARE

## 2020-02-26 VITALS
DIASTOLIC BLOOD PRESSURE: 78 MMHG | RESPIRATION RATE: 16 BRPM | OXYGEN SATURATION: 94 % | TEMPERATURE: 98.3 F | HEART RATE: 76 BPM | SYSTOLIC BLOOD PRESSURE: 118 MMHG | HEIGHT: 62 IN | WEIGHT: 150 LBS | BODY MASS INDEX: 27.6 KG/M2

## 2020-02-26 DIAGNOSIS — H92.03 OTALGIA OF BOTH EARS: ICD-10-CM

## 2020-02-26 DIAGNOSIS — H69.93 DYSFUNCTION OF BOTH EUSTACHIAN TUBES: ICD-10-CM

## 2020-02-26 DIAGNOSIS — H90.3 SENSORINEURAL HEARING LOSS (SNHL) OF BOTH EARS: ICD-10-CM

## 2020-02-26 PROCEDURE — 99213 OFFICE O/P EST LOW 20 MIN: CPT | Performed by: FAMILY MEDICINE

## 2020-02-26 RX ORDER — FLUTICASONE PROPIONATE 50 MCG
1 SPRAY, SUSPENSION (ML) NASAL DAILY
Qty: 16 G | Refills: 2 | Status: SHIPPED | OUTPATIENT
Start: 2020-02-26 | End: 2020-04-22

## 2020-02-26 ASSESSMENT — PATIENT HEALTH QUESTIONNAIRE - PHQ9: CLINICAL INTERPRETATION OF PHQ2 SCORE: 0

## 2020-02-26 NOTE — PROGRESS NOTES
"Subjective:   Sandi Peterson is a 74 y.o. female here today for ear pain and fullness.    Otalgia of both ears  Sensorineural hearing loss (SNHL) of both ears  Dysfunction of both eustachian tubes  Patient has been having issues with her ears, left greater than right, for about a month now. States she feels an \"achiness deep down.\" Pushing the area just below her left ear seems to exacerbate the soreness. Laying on the side of the ear triggers a sensation like \"water deep down inside that is running.\" The ear feels moist inside at night. However, when she sticks a Q-tip in the ear, the Q-tip will be dry. She feels like she is talking through a tunnel. Patient states the onset of these symptoms coincidentally started at the same time that she experienced \"runny sinus\" symptoms and \"rocks\" in the ear. At that time, she also experienced intermittent dizziness that was alleviated if she tilted her head to put the left ear against the shoulder and counted for 10-15 seconds. She mentions that she used to wake up with dizziness that was associated with post nasal drainage. She used to take Flonase for this. However, she states that her current ear symptoms are not related to post nasal drainage. She reports having a decrease in her hearing over the last year. She is unsure if the decreased hearing is associated with her current ear symptoms. She reports having sinus headaches occasionally. Denies any fevers or new sore throat. Patient states she used to see Dr. Bhatia (ENT) regularly, however, she states that he is retiring and is only seeing patients twice per week now. She was unable to get in to see him, prompting her to come in here today for evaluation. Patient notes she has an appointment in a month to evaluate her hearing.     Current medicines (including changes today)  Current Outpatient Medications   Medication Sig Dispense Refill   • fluticasone (FLONASE) 50 MCG/ACT nasal spray Spray 1 Spray in nose every " "day. 16 g 2   • clonazePAM (KLONOPIN) 0.5 MG Tab Take 1 Tab by mouth every day.  2   • Albuterol Sulfate (PROAIR RESPICLICK) 108 (90 Base) MCG/ACT AEROSOL POWDER, BREATH ACTIVATED Inhale 2 Puffs by mouth 4 times a day as needed. 1 Each 2   • levothyroxine (SYNTHROID) 50 MCG Tab Take 1 Tab by mouth every morning. ON A EMPTY STOMACH 90 Tab 3   • metoprolol (LOPRESSOR) 25 MG Tab Take 0.5 Tabs by mouth 2 times a day. 180 Tab 3   • spironolactone (ALDACTONE) 25 MG Tab Take 0.5 Tabs by mouth every day. 45 Tab 3   • rivaroxaban (XARELTO) 20 MG Tab tablet Take 1 Tab by mouth every day. 30 Tab 11   • polyethylene glycol/lytes (MIRALAX) Pack Take 17 g by mouth as needed.     • Cyanocobalamin (VITAMIN B-12) 1000 MCG Tab Take 2,000 mcg by mouth every day.     • therapeutic multivitamin-minerals (THERAGRAN-M) Tab Take 1 Tab by mouth every day.     • Cholecalciferol (VITAMIN D) 2000 UNITS Cap Take 1,000 Units by mouth every day.     • acetaminophen (TYLENOL) 325 MG TABS Take 325-650 mg by mouth as needed.     • famotidine (PEPCID) 20 MG TABS Take 10 mg by mouth as needed. For GERD       No current facility-administered medications for this visit.      She  has a past medical history of Allergic rhinitis, Angina of effort ([facility]), Anxiety, Arthritis, Breath shortness, Cataract (06/28/2019), DVT (deep venous thrombosis) ([facility]) (2013), GERD (gastroesophageal reflux disease), Gout, Hiatal hernia, Hiatus hernia syndrome, Hyperlipidemia, Hypertension, Hypoglycemia, Hypothyroidism, PE (pulmonary embolism) (2013), Pneumonia (2005), Renal insufficiency, and Urinary frequency.    ROS   No chest pain, no shortness of breath, no abdominal pain. No fevers, no sore throat.     Objective:     Physical Exam:  /78   Pulse 76   Temp 36.8 °C (98.3 °F)   Resp 16   Ht 1.575 m (5' 2\")   Wt 68 kg (150 lb)   SpO2 94%  Body mass index is 27.44 kg/m².  Constitutional: Alert, no distress.  Skin: Warm, dry, good turgor, no rashes in visible " areas.  Eye: Equal, round and reactive, conjunctiva clear, lids normal.  ENMT: TM's clear bilaterally, she has a medium sized clear middle ear effusion on right side and large clear middle ear effusion on left. lips without lesions, good dentition, oropharynx clear.  Neck: Trachea midline, no masses, no thyromegaly. No cervical or supraclavicular lymphadenopathy.  Respiratory: Unlabored respiratory effort, lungs clear to auscultation, no wheezes, no rhonchi.  Cardiovascular: Normal S1, S2, no murmur, no edema.  Psych: Alert and oriented x3, normal affect and mood.    Assessment and Plan:     1. Otalgia of both ears  2. Sensorineural hearing loss (SNHL) of both ears  3. Dysfunction of both eustachian tubes  - Discussed potential etiologies of her symptoms such as fluid in the ear. Evidence of eustachian tube dysfunction on exam. Supportive care instructions and return precautions given. Prescribed fluticasone. Informed patient her symptoms should improve with time. Advised to contact us if her symptoms do not seem to be improving.   - fluticasone (FLONASE) 50 MCG/ACT nasal spray; Spray 1 Spray in nose every day.  Dispense: 16 g; Refill: 2    Followup: Return if symptoms worsen or fail to improve.          Milan BREWSTER (Scribe), am scribing for, and in the presence of, Maricruz Phelan MD    Electronically signed by: Milan Islas (Kanikaibe), 2/26/2020    IMaricruz MD personally performed the services described in this documentation, as scribed by Milan Islas in my presence, and it is both accurate and complete.

## 2020-04-22 DIAGNOSIS — H92.03 OTALGIA OF BOTH EARS: ICD-10-CM

## 2020-04-22 DIAGNOSIS — Z79.01 CHRONIC ANTICOAGULATION: ICD-10-CM

## 2020-04-22 DIAGNOSIS — Z86.718 HISTORY OF DVT (DEEP VEIN THROMBOSIS): ICD-10-CM

## 2020-04-22 DIAGNOSIS — Z86.711 HISTORY OF PULMONARY EMBOLISM: ICD-10-CM

## 2020-04-23 RX ORDER — RIVAROXABAN 20 MG/1
TABLET, FILM COATED ORAL
Qty: 90 TAB | Refills: 0 | Status: SHIPPED | OUTPATIENT
Start: 2020-04-23 | End: 2020-08-23

## 2020-04-23 RX ORDER — FLUTICASONE PROPIONATE 50 MCG
SPRAY, SUSPENSION (ML) NASAL
Qty: 16 G | Refills: 2 | Status: SHIPPED | OUTPATIENT
Start: 2020-04-23 | End: 2021-05-13 | Stop reason: SDUPTHER

## 2020-06-22 DIAGNOSIS — E03.9 ACQUIRED HYPOTHYROIDISM: ICD-10-CM

## 2020-06-22 RX ORDER — LEVOTHYROXINE SODIUM 0.05 MG/1
TABLET ORAL
Qty: 90 TAB | Refills: 3 | Status: SHIPPED | OUTPATIENT
Start: 2020-06-22 | End: 2021-05-13 | Stop reason: SDUPTHER

## 2020-06-26 ENCOUNTER — HOSPITAL ENCOUNTER (OUTPATIENT)
Dept: LAB | Facility: MEDICAL CENTER | Age: 75
End: 2020-06-26
Attending: NURSE PRACTITIONER
Payer: MEDICARE

## 2020-06-26 LAB
ALBUMIN SERPL BCP-MCNC: 4.2 G/DL (ref 3.2–4.9)
ALBUMIN/GLOB SERPL: 1.7 G/DL
ALP SERPL-CCNC: 75 U/L (ref 30–99)
ALT SERPL-CCNC: 13 U/L (ref 2–50)
ANION GAP SERPL CALC-SCNC: 12 MMOL/L (ref 7–16)
APPEARANCE UR: CLEAR
AST SERPL-CCNC: 20 U/L (ref 12–45)
BACTERIA #/AREA URNS HPF: NEGATIVE /HPF
BASOPHILS # BLD AUTO: 0.7 % (ref 0–1.8)
BASOPHILS # BLD: 0.04 K/UL (ref 0–0.12)
BILIRUB SERPL-MCNC: 0.3 MG/DL (ref 0.1–1.5)
BILIRUB UR QL STRIP.AUTO: NEGATIVE
BUN SERPL-MCNC: 24 MG/DL (ref 8–22)
CALCIUM SERPL-MCNC: 9.6 MG/DL (ref 8.5–10.5)
CHLORIDE SERPL-SCNC: 103 MMOL/L (ref 96–112)
CO2 SERPL-SCNC: 24 MMOL/L (ref 20–33)
COLOR UR: YELLOW
CREAT SERPL-MCNC: 0.96 MG/DL (ref 0.5–1.4)
CREAT UR-MCNC: 160.89 MG/DL
EOSINOPHIL # BLD AUTO: 0.23 K/UL (ref 0–0.51)
EOSINOPHIL NFR BLD: 3.8 % (ref 0–6.9)
EPI CELLS #/AREA URNS HPF: NORMAL /HPF
ERYTHROCYTE [DISTWIDTH] IN BLOOD BY AUTOMATED COUNT: 48.7 FL (ref 35.9–50)
GLOBULIN SER CALC-MCNC: 2.5 G/DL (ref 1.9–3.5)
GLUCOSE SERPL-MCNC: 104 MG/DL (ref 65–99)
GLUCOSE UR STRIP.AUTO-MCNC: NEGATIVE MG/DL
HCT VFR BLD AUTO: 45.8 % (ref 37–47)
HGB BLD-MCNC: 14.4 G/DL (ref 12–16)
IMM GRANULOCYTES # BLD AUTO: 0.01 K/UL (ref 0–0.11)
IMM GRANULOCYTES NFR BLD AUTO: 0.2 % (ref 0–0.9)
KETONES UR STRIP.AUTO-MCNC: NEGATIVE MG/DL
LEUKOCYTE ESTERASE UR QL STRIP.AUTO: ABNORMAL
LYMPHOCYTES # BLD AUTO: 2.14 K/UL (ref 1–4.8)
LYMPHOCYTES NFR BLD: 35.1 % (ref 22–41)
MCH RBC QN AUTO: 29.9 PG (ref 27–33)
MCHC RBC AUTO-ENTMCNC: 31.4 G/DL (ref 33.6–35)
MCV RBC AUTO: 95 FL (ref 81.4–97.8)
MICRO URNS: ABNORMAL
MONOCYTES # BLD AUTO: 0.52 K/UL (ref 0–0.85)
MONOCYTES NFR BLD AUTO: 8.5 % (ref 0–13.4)
NEUTROPHILS # BLD AUTO: 3.16 K/UL (ref 2–7.15)
NEUTROPHILS NFR BLD: 51.7 % (ref 44–72)
NITRITE UR QL STRIP.AUTO: NEGATIVE
NRBC # BLD AUTO: 0 K/UL
NRBC BLD-RTO: 0 /100 WBC
PH UR STRIP.AUTO: 6.5 [PH] (ref 5–8)
PHOSPHATE SERPL-MCNC: 3.6 MG/DL (ref 2.5–4.5)
PLATELET # BLD AUTO: 224 K/UL (ref 164–446)
PMV BLD AUTO: 10.6 FL (ref 9–12.9)
POTASSIUM SERPL-SCNC: 5.2 MMOL/L (ref 3.6–5.5)
PROT SERPL-MCNC: 6.7 G/DL (ref 6–8.2)
PROT UR QL STRIP: NEGATIVE MG/DL
PROT UR-MCNC: 14 MG/DL (ref 0–15)
PROT/CREAT UR: 87 MG/G (ref 10–107)
RBC # BLD AUTO: 4.82 M/UL (ref 4.2–5.4)
RBC # URNS HPF: NORMAL /HPF
RBC UR QL AUTO: NEGATIVE
SODIUM SERPL-SCNC: 139 MMOL/L (ref 135–145)
SP GR UR STRIP.AUTO: 1.02
UROBILINOGEN UR STRIP.AUTO-MCNC: 0.2 MG/DL
WBC # BLD AUTO: 6.1 K/UL (ref 4.8–10.8)
WBC #/AREA URNS HPF: NORMAL /HPF

## 2020-06-26 PROCEDURE — 36415 COLL VENOUS BLD VENIPUNCTURE: CPT

## 2020-06-26 PROCEDURE — 80053 COMPREHEN METABOLIC PANEL: CPT

## 2020-06-26 PROCEDURE — 81001 URINALYSIS AUTO W/SCOPE: CPT

## 2020-06-26 PROCEDURE — 85025 COMPLETE CBC W/AUTO DIFF WBC: CPT

## 2020-06-26 PROCEDURE — 84100 ASSAY OF PHOSPHORUS: CPT

## 2020-07-02 ENCOUNTER — DOCUMENTATION (OUTPATIENT)
Dept: MEDICAL GROUP | Facility: MEDICAL CENTER | Age: 75
End: 2020-07-02

## 2020-07-02 NOTE — PROGRESS NOTES
MEDICATION PRIOR AUTHORIZATION NEEDED:    1. Name of Medication: Xarelto     2. Requested By (Name of Pharmacy): [facility]      3. Is insurance on file current? Yes    4. What is the name & phone number of the 3rd party payor? [employer] [phone]    PA submitted via Cover [employer] 07/02/2020 and scanned into [employer]

## 2020-07-06 ENCOUNTER — APPOINTMENT (OUTPATIENT)
Dept: MEDICAL GROUP | Facility: PHYSICIAN GROUP | Age: 75
End: 2020-07-06
Payer: MEDICARE

## 2020-08-17 DIAGNOSIS — I10 ESSENTIAL HYPERTENSION: ICD-10-CM

## 2020-08-21 DIAGNOSIS — Z79.01 CHRONIC ANTICOAGULATION: ICD-10-CM

## 2020-08-21 DIAGNOSIS — Z86.711 HISTORY OF PULMONARY EMBOLISM: ICD-10-CM

## 2020-08-21 DIAGNOSIS — Z86.718 HISTORY OF DVT (DEEP VEIN THROMBOSIS): ICD-10-CM

## 2020-08-23 RX ORDER — RIVAROXABAN 20 MG/1
TABLET, FILM COATED ORAL
Qty: 90 TAB | Refills: 1 | Status: SHIPPED | OUTPATIENT
Start: 2020-08-23 | End: 2020-10-26 | Stop reason: SDUPTHER

## 2020-09-24 ENCOUNTER — TELEPHONE (OUTPATIENT)
Dept: MEDICAL GROUP | Facility: PHYSICIAN GROUP | Age: 75
End: 2020-09-24

## 2020-09-24 NOTE — TELEPHONE ENCOUNTER
In reviewing her chart, I would recommend she stay on the 20mg dose.  If she were to have bleeding issues, we would reassess the medication.  We can postpone her appointment.  -Maricruz Phelan M.D.

## 2020-09-24 NOTE — TELEPHONE ENCOUNTER
Pt called and left a VM stating she was rescheduled for 10/01 to see Dr. Garcia to discuss a refill on her xarelto.    Pt states in VM she does not want to see anyone other than Dr. Phelan as she wanted to discuss changing the dose of her Rx.  She would like to know if she would be able to go from xarelto 20 MG to 15 MG, and if the lower dosage would still be enough to prevent blood clots.    Pt would like to know if she would need an appt to discuss this change in medication or if this could be changed without an appt.

## 2020-09-25 NOTE — TELEPHONE ENCOUNTER
Pt notified and appt has been changed to schedule with Dr. Phelan as she preferred to see her own PCP.    Pt was also scheduled for HD flu vaccine, advised her we do not have this currently in stock, pt did not want to do drive thru, scheduled pt next Friday 10/2 for HD flu vaccine, advised pt I will contact her if we do not have it available at that time either, or if we receive it earlier to see if she can come in sooner to get this vaccine.

## 2020-09-29 DIAGNOSIS — I10 ESSENTIAL HYPERTENSION: ICD-10-CM

## 2020-09-30 RX ORDER — SPIRONOLACTONE 25 MG/1
TABLET ORAL
Qty: 45 TAB | Refills: 0 | Status: SHIPPED | OUTPATIENT
Start: 2020-09-30 | End: 2021-01-04 | Stop reason: SDUPTHER

## 2020-10-02 ENCOUNTER — NON-PROVIDER VISIT (OUTPATIENT)
Dept: MEDICAL GROUP | Facility: PHYSICIAN GROUP | Age: 75
End: 2020-10-02
Payer: MEDICARE

## 2020-10-02 DIAGNOSIS — Z23 NEED FOR VACCINATION: ICD-10-CM

## 2020-10-02 PROCEDURE — G0008 ADMIN INFLUENZA VIRUS VAC: HCPCS | Performed by: FAMILY MEDICINE

## 2020-10-02 PROCEDURE — 90662 IIV NO PRSV INCREASED AG IM: CPT | Performed by: FAMILY MEDICINE

## 2020-10-02 NOTE — NON-PROVIDER
"Sandi Peterson is a 75 y.o. female here for a non-provider visit for:   FLU    Reason for immunization: Annual Flu Vaccine  Immunization records indicate need for vaccine: Yes, confirmed with Epic  Minimum interval has been met for this vaccine: Yes  ABN completed: Yes    Order and dose verified by: Jovanny Gregg  VIS was given to patient: Yes  All IAC Questionnaire questions were answered \"No.\"    Patient tolerated injection and no adverse effects were observed or reported: Yes    Pt scheduled for next dose in series: Not Indicated    "

## 2020-10-09 DIAGNOSIS — R06.09 DYSPNEA ON EXERTION: ICD-10-CM

## 2020-10-09 RX ORDER — ALBUTEROL SULFATE 90 UG/1
POWDER, METERED RESPIRATORY (INHALATION)
Qty: 1 EACH | Refills: 0 | Status: SHIPPED | OUTPATIENT
Start: 2020-10-09 | End: 2022-08-18 | Stop reason: CLARIF

## 2020-10-09 NOTE — TELEPHONE ENCOUNTER
Have we ever prescribed this med? Yes.  If yes, what date? 10/07/2019    Last OV: 10/07/2019 - Dr. Luna    Next OV: none scheduled; last pulmonary appt. 05/15/2017    DX: Dyspnea    Medications: Proair

## 2020-10-26 ENCOUNTER — OFFICE VISIT (OUTPATIENT)
Dept: MEDICAL GROUP | Facility: PHYSICIAN GROUP | Age: 75
End: 2020-10-26
Payer: MEDICARE

## 2020-10-26 VITALS
RESPIRATION RATE: 18 BRPM | SYSTOLIC BLOOD PRESSURE: 118 MMHG | BODY MASS INDEX: 28.34 KG/M2 | HEIGHT: 62 IN | OXYGEN SATURATION: 94 % | HEART RATE: 81 BPM | WEIGHT: 154 LBS | TEMPERATURE: 97.9 F | DIASTOLIC BLOOD PRESSURE: 76 MMHG

## 2020-10-26 DIAGNOSIS — Z86.718 HISTORY OF DVT (DEEP VEIN THROMBOSIS): ICD-10-CM

## 2020-10-26 DIAGNOSIS — Z79.01 CHRONIC ANTICOAGULATION: ICD-10-CM

## 2020-10-26 DIAGNOSIS — Z86.711 HISTORY OF PULMONARY EMBOLISM: ICD-10-CM

## 2020-10-26 DIAGNOSIS — N18.31 STAGE 3A CHRONIC KIDNEY DISEASE: ICD-10-CM

## 2020-10-26 DIAGNOSIS — K44.9 HIATAL HERNIA: ICD-10-CM

## 2020-10-26 PROCEDURE — 99214 OFFICE O/P EST MOD 30 MIN: CPT | Performed by: FAMILY MEDICINE

## 2020-10-26 ASSESSMENT — FIBROSIS 4 INDEX: FIB4 SCORE: 1.91

## 2020-10-26 NOTE — PROGRESS NOTES
Subjective:   Sandi Peterson is a 75 y.o. female here today for medication discussion and history of blood clots.  She is unaccompanied for today's appointment.    Sandi has questions today about her anticoagulation.  She is currently taking Xarelto 20 mg daily.  She has been on this regimen for several years.  She denies any adverse effects.  No blood in her stool, dark stools, or nosebleeds.    She is wondering if she can decrease the dose of Xarelto to lower her chances of having any bleeding episodes in the future.  Again, she has not had any GI bleeding or nosebleeds.    She was hospitalized back in 2013 for bilateral pulmonary emboli and bilateral DVT.  She had significant clot burden.  She was a hospitalized for 2 weeks and upon discharge was sent home with warfarin.  She had a difficult time tolerating this medication and was transitioned to aspirin by her previous physician.  Unfortunately, she had a recurrence of blood clots and it was at that time that she was started on Xarelto.  I have been prescribing her medication for her, but she was also following with a pulmonologist.  That pulmonologist has retired this year.    She does have a known large hiatal hernia.  This has been present on multiple imaging studies.  She has found that by eating smaller meals, she does not notice any symptoms.  She is not interested in surgery and would like to talk about over-the-counter medications that can help.  She does take an acid reflux medication from time to time.    Additionally, she has a stable history of stage III chronic kidney disease.  She is followed by Dr. Mckeon.  There has been no significant change in her chronic kidney disease and her GFR is for the last year have been above 50.     Current medicines (including changes today)  Current Outpatient Medications   Medication Sig Dispense Refill   • PROAIR RESPICLICK 108 (90 Base) MCG/ACT AEROSOL POWDER, BREATH ACTIVATED INHALE TWO PUFFS BY MOUTH  "FOUR TIMES DAILY AS NEEDED 1 Each 0   • spironolactone (ALDACTONE) 25 MG Tab TAKE 1/2 TABLET BY MOUTH EVERY DAY 45 Tab 0   • XARELTO 20 MG Tab tablet TAKE 1 TABLET BY MOUTH EVERY DAY 90 Tab 1   • metoprolol (LOPRESSOR) 25 MG Tab TAKE 1/2 TABLET BY MOUTH TWICE DAILY 180 Tab 3   • levothyroxine (SYNTHROID) 50 MCG Tab TAKE 1 TABLET BY MOUTH EVERY MORNING ON AN EMPTY STOMACH 90 Tab 3   • fluticasone (FLONASE) 50 MCG/ACT nasal spray SHAKE LIQUID AND USE 1 SPRAY IN EACH NOSTRIL EVERY DAY 16 g 2   • clonazePAM (KLONOPIN) 0.5 MG Tab Take 1 Tab by mouth every day.  2   • polyethylene glycol/lytes (MIRALAX) Pack Take 17 g by mouth as needed.     • Cyanocobalamin (VITAMIN B-12) 1000 MCG Tab Take 2,000 mcg by mouth every day.     • therapeutic multivitamin-minerals (THERAGRAN-M) Tab Take 1 Tab by mouth every day.     • Cholecalciferol (VITAMIN D) 2000 UNITS Cap Take 1,000 Units by mouth every day.     • acetaminophen (TYLENOL) 325 MG TABS Take 325-650 mg by mouth as needed.     • famotidine (PEPCID) 20 MG TABS Take 10 mg by mouth as needed. For GERD       No current facility-administered medications for this visit.      She  has a past medical history of Allergic rhinitis, Angina of effort ([facility]), Anxiety, Arthritis, Breath shortness, Cataract (06/28/2019), DVT (deep venous thrombosis) ([facility]) (2013), GERD (gastroesophageal reflux disease), Gout, Hiatal hernia, Hiatus hernia syndrome, Hyperlipidemia, Hypertension, Hypoglycemia, Hypothyroidism, PE (pulmonary embolism) (2013), Pneumonia (2005), Renal insufficiency, and Urinary frequency.    ROS   No chest pain, no shortness of breath, no abdominal pain.     Objective:     Physical Exam:  /76 (BP Location: Right arm, Patient Position: Sitting, BP Cuff Size: Adult)   Pulse 81   Temp 36.6 °C (97.9 °F) (Temporal)   Resp 18   Ht 1.575 m (5' 2\")   Wt 69.9 kg (154 lb)   SpO2 94%  Body mass index is 28.17 kg/m².   Constitutional: Alert, no distress, well-groomed.  Skin: Warm, " dry, good turgor, no rashes in visible areas.  Eye: Equal, round and reactive, conjunctiva clear, lids normal.  ENMT: +Mask.  Neck: Trachea midline, no masses, no thyromegaly.  Respiratory: Unlabored respiratory effort, no cough.  MSK: Normal gait, moves all extremities.  Neuro: Grossly non-focal. No cranial nerve deficit. Strength and sensation intact.   Psych: Alert and oriented x3, normal affect and mood.    Assessment and Plan:     1. Chronic anticoagulation  2. History of pulmonary embolism  3. History of DVT (deep vein thrombosis)  4. Stage 3a chronic kidney disease  5. Hiatal hernia  These are all relatively chronic and stable issues for the patient.  We had a good discussion today regarding her indications for indefinite anticoagulation.  I let her know that her creatinine clearance was high enough where a dosage adjustment of the Xarelto was not needed.  I also reassured her that having a hiatal hernia in itself would not be a reason for adjusting the dosage of Xarelto or discontinuing it.  Given her significant clot burden as well as recurrence of blood clots, it is still recommended for her to be on anticoagulation.  She does not have any risks of high bleeding, such as falls or history of GI bleeding.  I will do some further research to see if a lower dosage would be indicated based off of her age.  I let patient know that if a dose adjustment was recommended I would get in touch with her.  Additionally, I did discuss with the patient that we would need to make sure that we had an evidence-based reason for dosage adjustment otherwise that could mean that her insurance company would no longer cover the anticoagulation.  She voiced understanding and agreed to stay on Xarelto 20 mg for now.  A new prescription was sent to her pharmacy.  This also included and ask for a 30-day supply as this is easier for her to afford.  - rivaroxaban (XARELTO) 20 MG Tab tablet; Take 1 Tab by mouth every day.  Dispense: 30  Tab; Refill: 11    Followup: 6 months         PLEASE NOTE: This dictation was created using voice recognition software. I have made every reasonable attempt to correct obvious errors, but I expect that there are errors of grammar and possibly content that I did not discover before finalizing the note.

## 2021-01-04 DIAGNOSIS — I10 ESSENTIAL HYPERTENSION: ICD-10-CM

## 2021-01-04 RX ORDER — SPIRONOLACTONE 25 MG/1
12.5 TABLET ORAL DAILY
Qty: 45 TAB | Refills: 0 | Status: SHIPPED | OUTPATIENT
Start: 2021-01-04 | End: 2021-05-13 | Stop reason: SDUPTHER

## 2021-01-11 DIAGNOSIS — Z23 NEED FOR VACCINATION: ICD-10-CM

## 2021-03-05 ENCOUNTER — PATIENT MESSAGE (OUTPATIENT)
Dept: MEDICAL GROUP | Facility: PHYSICIAN GROUP | Age: 76
End: 2021-03-05

## 2021-03-05 DIAGNOSIS — R11.0 NAUSEA: ICD-10-CM

## 2021-03-05 RX ORDER — ONDANSETRON 4 MG/1
4 TABLET, ORALLY DISINTEGRATING ORAL EVERY 6 HOURS PRN
Qty: 20 TABLET | Refills: 0 | Status: SHIPPED | OUTPATIENT
Start: 2021-03-05 | End: 2021-05-13

## 2021-03-25 DIAGNOSIS — G62.9 PERIPHERAL POLYNEUROPATHY: ICD-10-CM

## 2021-03-25 DIAGNOSIS — F41.9 ANXIETY: ICD-10-CM

## 2021-03-25 RX ORDER — CLONAZEPAM 0.5 MG/1
.25-.5 TABLET ORAL
Qty: 30 TABLET | Refills: 0 | Status: SHIPPED | OUTPATIENT
Start: 2021-03-25 | End: 2021-05-13

## 2021-05-10 SDOH — ECONOMIC STABILITY: HOUSING INSECURITY: IN THE LAST 12 MONTHS, HOW MANY PLACES HAVE YOU LIVED?: 1

## 2021-05-10 SDOH — ECONOMIC STABILITY: TRANSPORTATION INSECURITY
IN THE PAST 12 MONTHS, HAS LACK OF RELIABLE TRANSPORTATION KEPT YOU FROM MEDICAL APPOINTMENTS, MEETINGS, WORK OR FROM GETTING THINGS NEEDED FOR DAILY LIVING?: NO

## 2021-05-10 SDOH — ECONOMIC STABILITY: TRANSPORTATION INSECURITY
IN THE PAST 12 MONTHS, HAS THE LACK OF TRANSPORTATION KEPT YOU FROM MEDICAL APPOINTMENTS OR FROM GETTING MEDICATIONS?: NO

## 2021-05-10 SDOH — ECONOMIC STABILITY: INCOME INSECURITY: IN THE LAST 12 MONTHS, WAS THERE A TIME WHEN YOU WERE NOT ABLE TO PAY THE MORTGAGE OR RENT ON TIME?: NO

## 2021-05-10 SDOH — ECONOMIC STABILITY: HOUSING INSECURITY
IN THE LAST 12 MONTHS, WAS THERE A TIME WHEN YOU DID NOT HAVE A STEADY PLACE TO SLEEP OR SLEPT IN A SHELTER (INCLUDING NOW)?: NO

## 2021-05-10 SDOH — HEALTH STABILITY: PHYSICAL HEALTH: ON AVERAGE, HOW MANY DAYS PER WEEK DO YOU ENGAGE IN MODERATE TO STRENUOUS EXERCISE (LIKE A BRISK WALK)?: 0 DAYS

## 2021-05-10 SDOH — HEALTH STABILITY: PHYSICAL HEALTH: ON AVERAGE, HOW MANY MINUTES DO YOU ENGAGE IN EXERCISE AT THIS LEVEL?: 0 MINUTES

## 2021-05-10 SDOH — HEALTH STABILITY: MENTAL HEALTH
STRESS IS WHEN SOMEONE FEELS TENSE, NERVOUS, ANXIOUS, OR CAN'T SLEEP AT NIGHT BECAUSE THEIR MIND IS TROUBLED. HOW STRESSED ARE YOU?: ONLY A LITTLE

## 2021-05-10 ASSESSMENT — SOCIAL DETERMINANTS OF HEALTH (SDOH)
HOW OFTEN DO YOU HAVE SIX OR MORE DRINKS ON ONE OCCASION: NEVER
HOW OFTEN DO YOU ATTEND CHURCH OR RELIGIOUS SERVICES?: NEVER
DO YOU BELONG TO ANY CLUBS OR ORGANIZATIONS SUCH AS CHURCH GROUPS UNIONS, FRATERNAL OR ATHLETIC GROUPS, OR SCHOOL GROUPS?: NO
WITHIN THE PAST 12 MONTHS, THE FOOD YOU BOUGHT JUST DIDN'T LAST AND YOU DIDN'T HAVE MONEY TO GET MORE: NEVER TRUE
HOW OFTEN DO YOU GET TOGETHER WITH FRIENDS OR RELATIVES?: ONCE A WEEK
WITHIN THE PAST 12 MONTHS, YOU WORRIED THAT YOUR FOOD WOULD RUN OUT BEFORE YOU GOT THE MONEY TO BUY MORE: NEVER TRUE
HOW OFTEN DO YOU HAVE A DRINK CONTAINING ALCOHOL: NEVER
IN A TYPICAL WEEK, HOW MANY TIMES DO YOU TALK ON THE PHONE WITH FAMILY, FRIENDS, OR NEIGHBORS?: MORE THAN THREE TIMES A WEEK
HOW HARD IS IT FOR YOU TO PAY FOR THE VERY BASICS LIKE FOOD, HOUSING, MEDICAL CARE, AND HEATING?: NOT HARD AT ALL
HOW OFTEN DO YOU ATTENT MEETINGS OF THE CLUB OR ORGANIZATION YOU BELONG TO?: NEVER

## 2021-05-13 ENCOUNTER — OFFICE VISIT (OUTPATIENT)
Dept: MEDICAL GROUP | Facility: PHYSICIAN GROUP | Age: 76
End: 2021-05-13
Payer: MEDICARE

## 2021-05-13 VITALS
HEART RATE: 88 BPM | SYSTOLIC BLOOD PRESSURE: 110 MMHG | OXYGEN SATURATION: 95 % | HEIGHT: 62 IN | WEIGHT: 157.2 LBS | DIASTOLIC BLOOD PRESSURE: 68 MMHG | RESPIRATION RATE: 20 BRPM | BODY MASS INDEX: 28.93 KG/M2 | TEMPERATURE: 99.3 F

## 2021-05-13 DIAGNOSIS — F51.01 PRIMARY INSOMNIA: ICD-10-CM

## 2021-05-13 DIAGNOSIS — Z79.01 CHRONIC ANTICOAGULATION: ICD-10-CM

## 2021-05-13 DIAGNOSIS — N18.31 STAGE 3A CHRONIC KIDNEY DISEASE: ICD-10-CM

## 2021-05-13 DIAGNOSIS — E55.9 VITAMIN D DEFICIENCY: ICD-10-CM

## 2021-05-13 DIAGNOSIS — R73.03 PREDIABETES: ICD-10-CM

## 2021-05-13 DIAGNOSIS — J30.89 ENVIRONMENTAL AND SEASONAL ALLERGIES: ICD-10-CM

## 2021-05-13 DIAGNOSIS — E78.00 PURE HYPERCHOLESTEROLEMIA: ICD-10-CM

## 2021-05-13 DIAGNOSIS — Z86.718 HISTORY OF DVT (DEEP VEIN THROMBOSIS): ICD-10-CM

## 2021-05-13 DIAGNOSIS — K44.9 HIATAL HERNIA: ICD-10-CM

## 2021-05-13 DIAGNOSIS — Z86.711 HISTORY OF PULMONARY EMBOLISM: ICD-10-CM

## 2021-05-13 DIAGNOSIS — E03.9 ACQUIRED HYPOTHYROIDISM: ICD-10-CM

## 2021-05-13 DIAGNOSIS — G62.9 PERIPHERAL POLYNEUROPATHY: ICD-10-CM

## 2021-05-13 DIAGNOSIS — I10 ESSENTIAL HYPERTENSION: ICD-10-CM

## 2021-05-13 PROCEDURE — 99214 OFFICE O/P EST MOD 30 MIN: CPT | Performed by: PHYSICIAN ASSISTANT

## 2021-05-13 RX ORDER — SPIRONOLACTONE 25 MG/1
12.5 TABLET ORAL
Qty: 90 TABLET | Refills: 3 | Status: SHIPPED | OUTPATIENT
Start: 2021-05-13 | End: 2021-11-11 | Stop reason: SDUPTHER

## 2021-05-13 RX ORDER — FLUTICASONE PROPIONATE 50 MCG
SPRAY, SUSPENSION (ML) NASAL
Qty: 32 G | Refills: 4 | Status: SHIPPED | OUTPATIENT
Start: 2021-05-13 | End: 2021-11-11 | Stop reason: SDUPTHER

## 2021-05-13 RX ORDER — LEVOTHYROXINE SODIUM 0.05 MG/1
50 TABLET ORAL EVERY MORNING
Qty: 90 TABLET | Refills: 3 | Status: SHIPPED | OUTPATIENT
Start: 2021-05-13 | End: 2021-11-11 | Stop reason: SDUPTHER

## 2021-05-13 ASSESSMENT — FIBROSIS 4 INDEX: FIB4 SCORE: 1.91

## 2021-05-13 ASSESSMENT — PATIENT HEALTH QUESTIONNAIRE - PHQ9: CLINICAL INTERPRETATION OF PHQ2 SCORE: 0

## 2021-05-13 NOTE — PROGRESS NOTES
Chief Complaint   Patient presents with   • Establish Care       HISTORY OF PRESENT ILLNESS: Sandi Peterson is an established 75 y.o. female here to discuss the evaluation and management of:    Essential hypertension  Chronic but stable problem.  Patient's blood pressure during today's appointment 110/68 mmHg.  Patient monitors her blood pressure at home and gets similar readings to today's readings.  She tells me that she takes 12.5 mg of metoprolol every night and when she experiences lower extremity swelling she will take half a tablet to a full tablet of spironolactone for 3 days straight she will peripheral edema symptoms resolved.  She monitor blood pressure closely when she is taking spironolactone.  Denies side effects or complications from medication.  She denies chest pain, shortness of breath, heart palpitations, dizziness, syncope, severe headache, vision changes.    Stage 3a chronic kidney disease  Chronic but stable problem.  Patient avoids nephrotoxic drugs.  She follows up with her nephrologist, Dr. Mckeon annually.    Acquired hypothyroidism  Chronic problem.  Unknown stable.  Lab work is past due.  Patient takes Synthroid 50 MCG tab once daily.  She denies side effects or complications medication.  Patient is asymptomatic.    Prediabetes  Chronic problem.  Unknown is stable.  Patient is past due for lab work.  She feels her diet is healthy and she tries live an active lifestyle.  Patient is asymptomatic.  Denies polyuria, polydipsia, poor wound healing, vision changes, peripheral neuropathy.    Pure hypercholesterolemia  Chronic problem.  Uncontrolled.  Patient's 10-year ASCVD risk score is high risk.  Patient declines statin medication.    Hiatal hernia  Chronic but stable problem.  Patient has a positive medical history for large hiatal hernia.  She treats acid reflux symptoms with Pepcid.  This works well for her.    Vitamin D deficiency  Chronic problem.  Unstable.  Lab work is past due.   Patient takes 2000 units of vitamin D once daily.    Chronic anticoagulation  History of pulmonary embolism  History of DVT (deep vein thrombosis)  Patient has a positive past medical history for PE and DVT.  She is on chronic anticoagulation.  Patient takes Xarelto as prescribed.  Denies side effects or complications from medication.    Primary insomnia  Peripheral polyneuropathy  Patient intermittently experiences insomnia and has peripheral neuropathy.  States when sleep deprivation symptoms and anxiety symptoms are exacerbated she will take prescription Klonopin.  States this is on as-needed basis and she infrequently takes medication.  She denies misuse or abuse of medication.  Patient's prior PCP recently refilled medication.  Does not need refill at this time.    Environmental and seasonal allergies  Chronic but stable problem.  Environmental seasonal allergies are managed with Flonase.  This regimen works well for her.  No further work-up.        Patient Active Problem List    Diagnosis Date Noted   • Environmental and seasonal allergies 05/13/2021   • Vitamin D deficiency 10/23/2019   • Hiatal hernia 10/23/2019   • Prediabetes 11/07/2018   • History of DVT (deep vein thrombosis) 05/21/2018   • Pure hypercholesterolemia 07/12/2017   • Chronic anticoagulation 07/12/2017   • Peripheral polyneuropathy 07/12/2017   • Primary insomnia 11/16/2015   • Acquired hypothyroidism 07/09/2015   • History of gout 05/13/2015   • CKD (chronic kidney disease) stage 3, GFR 30-59 ml/min 05/13/2015   • Essential hypertension 04/06/2015   • History of pulmonary embolism 04/23/2014       Allergies:Lisinopril, Nsaids, Prednisone, Pseudoephedrine hcl, Mobic, Ace inhibitors, Ciprofloxacin, and Clindamycin    Current Outpatient Medications   Medication Sig Dispense Refill   • metoprolol tartrate (LOPRESSOR) 25 MG Tab Take 0.5 Tablets by mouth at bedtime. 90 tablet 1   • levothyroxine (SYNTHROID) 50 MCG Tab Take 1 tablet by mouth  every morning. 90 tablet 3   • spironolactone (ALDACTONE) 25 MG Tab Take 0.5 Tablets by mouth 1 time a day as needed. 90 tablet 3   • fluticasone (FLONASE) 50 MCG/ACT nasal spray SHAKE LIQUID AND USE 1 SPRAY IN EACH NOSTRIL EVERY DAY 32 g 4   • clonazePAM (KLONOPIN) 0.5 MG Tab Take 0.5-1 Tablets by mouth 1 time a day as needed (neuropathy or anxiety) for up to 30 days. 30 tablet 0   • rivaroxaban (XARELTO) 20 MG Tab tablet Take 1 Tab by mouth every day. 30 Tab 11   • PROAIR RESPICLICK 108 (90 Base) MCG/ACT AEROSOL POWDER, BREATH ACTIVATED INHALE TWO PUFFS BY MOUTH FOUR TIMES DAILY AS NEEDED 1 Each 0   • polyethylene glycol/lytes (MIRALAX) Pack Take 17 g by mouth as needed.     • Cyanocobalamin (VITAMIN B-12) 1000 MCG Tab Take 2,000 mcg by mouth every day.     • therapeutic multivitamin-minerals (THERAGRAN-M) Tab Take 1 Tab by mouth every day.     • Cholecalciferol (VITAMIN D) 2000 UNITS Cap Take 1,000 Units by mouth every day.     • acetaminophen (TYLENOL) 325 MG TABS Take 325-650 mg by mouth as needed.     • famotidine (PEPCID) 20 MG TABS Take 10 mg by mouth as needed. For GERD       No current facility-administered medications for this visit.       Social History     Tobacco Use   • Smoking status: Never Smoker   • Smokeless tobacco: Never Used   Substance Use Topics   • Alcohol use: No     Alcohol/week: 0.0 oz   • Drug use: No       Family Status   Relation Name Status   • Mo          Breast Cancer   • Fa          Cancer   • Bro  (Not Specified)        Cacer, Thyroid problems   History reviewed. No pertinent family history.    ROS:  Review of Systems   Constitutional: Negative for fever, chills, weight loss and malaise/fatigue.   HENT: Negative for ear pain, nosebleeds, congestion, sore throat and neck pain.    Eyes: Negative for blurred vision.   Respiratory: Negative for cough, sputum production, shortness of breath and wheezing.    Cardiovascular: Negative for chest pain, palpitations,  "orthopnea and leg swelling.   Gastrointestinal: Negative for heartburn, nausea, vomiting and abdominal pain.   Genitourinary: Negative for dysuria, urgency and frequency.   Musculoskeletal: Negative for myalgias, back pain and joint pain.   Skin: Negative for rash and itching.   Neurological: Negative for dizziness, tingling, tremors, sensory change, focal weakness and headaches.   Endo/Heme/Allergies: Does not bruise/bleed easily.   Psychiatric/Behavioral: Negative for depression, suicidal ideas and memory loss.  The patient is not nervous/anxious and does not have insomnia.    All other systems reviewed and are negative except as in HPI.    Exam: /68 (BP Location: Left arm, Patient Position: Sitting, BP Cuff Size: Adult)   Pulse 88   Temp 37.4 °C (99.3 °F) (Temporal)   Resp 20   Ht 1.575 m (5' 2\")   Wt 71.3 kg (157 lb 3.2 oz)   SpO2 95%  Body mass index is 28.75 kg/m².  General: Normal appearing. No distress.  HEENT: Normocephalic. Eyes conjunctiva clear lids without ptosis, ears normal shape and contour.  Neck: Supple without JVD. Thyroid is not enlarged.  Pulmonary: Clear to ausculation.  Normal effort. No rales, ronchi, or wheezing.  Cardiovascular: Regular rate and rhythm without murmur.   Abdomen: Nondistended.   Neurologic: Grossly nonfocal.  Cranial nerves are normal.   Skin: Warm and dry.  No rashes or suspicious skin lesions.  Musculoskeletal: Normal gait. No extremity cyanosis, clubbing, or edema.  Psych: Normal mood and affect. Alert and oriented x3. Judgment and insight is normal.    Medical decision-making and discussion:  1. Essential hypertension  Well-controlled. Labs as indicated. Continue antihypertensive medications. Discussed decreasing salt intake. Emphasized benefits of exercise and diet. Continue to monitor.    - metoprolol tartrate (LOPRESSOR) 25 MG Tab; Take 0.5 Tablets by mouth at bedtime.  Dispense: 90 tablet; Refill: 1  - spironolactone (ALDACTONE) 25 MG Tab; Take 0.5 " Tablets by mouth 1 time a day as needed.  Dispense: 90 tablet; Refill: 3    2. Stage 3a chronic kidney disease  Chronic but stable problem.  Continue following up with nephrologist.  Continue avoiding nephrotoxic drugs.  Continue work on hydration.    3. Acquired hypothyroidism  Continue thyroid medication. Instructed patient to take on empty stomach with glass of water, 30 minutes prior to food or other medications. Labs as indicated.    - TSH WITH REFLEX TO FT4; Future  - levothyroxine (SYNTHROID) 50 MCG Tab; Take 1 tablet by mouth every morning.  Dispense: 90 tablet; Refill: 3    4. Prediabetes  Chronic problem.  Unknown is stable.  Lab work has been ordered to further evaluate patient.  Advised patient to continue work on diet and exercise.  Continue to monitor.    - HEMOGLOBIN A1C; Future    5. Pure hypercholesterolemia  Chronic problem.  Unstable.  Patient declined statin medication.  10-year ASCVD score is high risk.  Advised patient continue working on diet and exercise.    6. Hiatal hernia  Chronic but stable problem.  Avoid known triggers.  Continue Pepcid as prescribed.  Continue to monitor.    7. Vitamin D deficiency  Chronic problem.  Unknown stable.  Patient states vitamin D lab work is not covered by her medical insurance.  She does not want vitamin D lab work ordered at this time.  Advised patient continue vitamin D supplementation.  Continue monitor.    8. Chronic anticoagulation  9. History of pulmonary embolism  10. History of DVT (deep vein thrombosis)  Patient has a positive medical history for pulmonary embolism and DVT.  She is on chronic anticoagulation.  Advised patient continue Xarelto as prescribed.  Discussed ED precautions.    11. Primary insomnia  12. Peripheral polyneuropathy  Chronic but stable problems.  Continue current medication regimen.  Continue monitor.  When patient is due for refill Klonopin she will need an in office appointment for this.    13. Environmental and seasonal  allergies  Chronic but stable problem.  Continue current medication regimen.  Continue to monitor.  - fluticasone (FLONASE) 50 MCG/ACT nasal spray; SHAKE LIQUID AND USE 1 SPRAY IN EACH NOSTRIL EVERY DAY  Dispense: 32 g; Refill: 4      Please note that this dictation was created using voice recognition software. I have made every reasonable attempt to correct obvious errors, but I expect that there are errors of grammar and possibly content that I did not discover before finalizing the note.    Assessment/Plan:  1. Essential hypertension  metoprolol tartrate (LOPRESSOR) 25 MG Tab    spironolactone (ALDACTONE) 25 MG Tab   2. Stage 3a chronic kidney disease     3. Acquired hypothyroidism  TSH WITH REFLEX TO FT4    levothyroxine (SYNTHROID) 50 MCG Tab   4. Prediabetes  HEMOGLOBIN A1C   5. Pure hypercholesterolemia     6. Hiatal hernia     7. Vitamin D deficiency     8. Chronic anticoagulation     9. History of pulmonary embolism     10. History of DVT (deep vein thrombosis)     11. Primary insomnia     12. Peripheral polyneuropathy     13. Environmental and seasonal allergies  fluticasone (FLONASE) 50 MCG/ACT nasal spray       Return in about 3 months (around 8/13/2021).

## 2021-05-28 ENCOUNTER — OFFICE VISIT (OUTPATIENT)
Dept: MEDICAL GROUP | Facility: PHYSICIAN GROUP | Age: 76
End: 2021-05-28
Payer: MEDICARE

## 2021-05-28 VITALS
RESPIRATION RATE: 16 BRPM | DIASTOLIC BLOOD PRESSURE: 74 MMHG | HEART RATE: 90 BPM | HEIGHT: 62 IN | WEIGHT: 158.2 LBS | BODY MASS INDEX: 29.11 KG/M2 | OXYGEN SATURATION: 93 % | TEMPERATURE: 98.8 F | SYSTOLIC BLOOD PRESSURE: 136 MMHG

## 2021-05-28 DIAGNOSIS — Z98.82 PRESENCE OF BILATERAL BREAST IMPLANT: ICD-10-CM

## 2021-05-28 DIAGNOSIS — T85.44XA CAPSULAR CONTRACTURE OF BREAST IMPLANT, INITIAL ENCOUNTER: ICD-10-CM

## 2021-05-28 DIAGNOSIS — N63.20 PAINFUL LUMPY LEFT BREAST: ICD-10-CM

## 2021-05-28 DIAGNOSIS — N64.4 PAINFUL LUMPY LEFT BREAST: ICD-10-CM

## 2021-05-28 DIAGNOSIS — E78.00 PURE HYPERCHOLESTEROLEMIA: ICD-10-CM

## 2021-05-28 PROCEDURE — 99213 OFFICE O/P EST LOW 20 MIN: CPT | Performed by: PHYSICIAN ASSISTANT

## 2021-05-28 ASSESSMENT — FIBROSIS 4 INDEX: FIB4 SCORE: 1.91

## 2021-05-28 NOTE — PROGRESS NOTES
Chief Complaint   Patient presents with   • Other     Breast ultrasound       HISTORY OF PRESENT ILLNESS: Sandi Peterson is an established 75 y.o. female here to discuss the evaluation and management of:    Patient is a pleasant 75-year-old female here today to discuss left upper breast pain symptoms.  Patient tells me for the past 1.5 years she has been experiencing intermittent daily left upper breast pain symptoms.  Describes pain as a low-grade aching pain that is felt with certain body positional movements and when pressure is applied.  States left upper breast feels  extremely sore with palpation.  She has been treating pain symptoms with over-the-counter Tylenol which helps with alleviation of pain symptoms.  She tells me that she recently followed up with plastic surgeon, Dr. Guru Fowler I was told the more likely she has a capsular contracture, possible low-grade infection and a very slow leak of left silicone breast implant.  Patient states she has had bilateral breast implants for 26 years.  States plastic surgeon recommended that she does not get a mammogram due to risk of rupturing silicone implants during procedure.  Patient denies other associated symptoms.  She denies fever, chills, nausea, vomiting, tachycardia, tachypnea, night sweats,    Patient has a positive medical history for pure hypercholesterolemia.  Discussed 10-year ASCVD score with patient.  Patient is high risk.  Patient declined statin medication at this time.    Patient Active Problem List    Diagnosis Date Noted   • Environmental and seasonal allergies 05/13/2021   • Vitamin D deficiency 10/23/2019   • Hiatal hernia 10/23/2019   • Prediabetes 11/07/2018   • History of DVT (deep vein thrombosis) 05/21/2018   • Pure hypercholesterolemia 07/12/2017   • Chronic anticoagulation 07/12/2017   • Peripheral polyneuropathy 07/12/2017   • Primary insomnia 11/16/2015   • Acquired hypothyroidism 07/09/2015   • History of gout 05/13/2015    • CKD (chronic kidney disease) stage 3, GFR 30-59 ml/min ([facility]) 2015   • Essential hypertension 2015   • History of pulmonary embolism 2014       Allergies:Lisinopril, Nsaids, Prednisone, Pseudoephedrine hcl, Mobic, Ace inhibitors, Ciprofloxacin, and Clindamycin    Current Outpatient Medications   Medication Sig Dispense Refill   • metoprolol tartrate (LOPRESSOR) 25 MG Tab Take 0.5 Tablets by mouth at bedtime. 90 tablet 1   • levothyroxine (SYNTHROID) 50 MCG Tab Take 1 tablet by mouth every morning. 90 tablet 3   • spironolactone (ALDACTONE) 25 MG Tab Take 0.5 Tablets by mouth 1 time a day as needed. 90 tablet 3   • fluticasone (FLONASE) 50 MCG/ACT nasal spray SHAKE LIQUID AND USE 1 SPRAY IN EACH NOSTRIL EVERY DAY 32 g 4   • rivaroxaban (XARELTO) 20 MG Tab tablet Take 1 Tab by mouth every day. 30 Tab 11   • PROAIR RESPICLICK 108 (90 Base) MCG/ACT AEROSOL POWDER, BREATH ACTIVATED INHALE TWO PUFFS BY MOUTH FOUR TIMES DAILY AS NEEDED 1 Each 0   • polyethylene glycol/lytes (MIRALAX) Pack Take 17 g by mouth as needed.     • Cyanocobalamin (VITAMIN B-12) 1000 MCG Tab Take 2,000 mcg by mouth every day.     • therapeutic multivitamin-minerals (THERAGRAN-M) Tab Take 1 Tab by mouth every day.     • Cholecalciferol (VITAMIN D) 2000 UNITS Cap Take 1,000 Units by mouth every day.     • acetaminophen (TYLENOL) 325 MG TABS Take 325-650 mg by mouth as needed.     • famotidine (PEPCID) 20 MG TABS Take 10 mg by mouth as needed. For GERD       No current facility-administered medications for this visit.       Social History     Tobacco Use   • Smoking status: Never Smoker   • Smokeless tobacco: Never Used   Substance Use Topics   • Alcohol use: No     Alcohol/week: 0.0 oz   • Drug use: No       Family Status   Relation Name Status   • Mo          Breast Cancer   • Fa          Cancer   • Bro  (Not Specified)        Cacer, Thyroid problems   No family history on file.    ROS:  Review of Systems  "  Constitutional: Negative for fever, chills, weight loss and malaise/fatigue.   HENT: Negative for ear pain, nosebleeds, congestion, sore throat and neck pain.    Eyes: Negative for blurred vision.   Respiratory: Negative for cough, sputum production, shortness of breath and wheezing.    Cardiovascular: Negative for chest pain, palpitations, orthopnea and leg swelling.   Gastrointestinal: Negative for heartburn, nausea, vomiting and abdominal pain.   Genitourinary: Negative for dysuria, urgency and frequency.   Musculoskeletal: Negative for myalgias, back pain and joint pain.   Skin: Negative for rash and itching.   Neurological: Negative for dizziness, tingling, tremors, sensory change, focal weakness and headaches.   Endo/Heme/Allergies: Does not bruise/bleed easily.   Psychiatric/Behavioral: Negative for depression, suicidal ideas and memory loss.  The patient is not nervous/anxious and does not have insomnia.    All other systems reviewed and are negative except as in HPI.    Exam: /74 (BP Location: Left arm, Patient Position: Sitting, BP Cuff Size: Adult)   Pulse 90   Temp 37.1 °C (98.8 °F) (Temporal)   Resp 16   Ht 1.575 m (5' 2\")   Wt 71.8 kg (158 lb 3.2 oz)   SpO2 93%  Body mass index is 28.94 kg/m².  General: Normal appearing. No distress.  HEENT: Normocephalic. Eyes conjunctiva clear lids without ptosis, ears normal shape and contour.   Neck: Supple without JVD . Thyroid is not enlarged.  Pulmonary: Clear to ausculation.  Normal effort. No rales, ronchi, or wheezing.  Cardiovascular: Regular rate and rhythm with murmur.   Abdomen: Nondistended.   Neurologic: Grossly nonfocal.  Cranial nerves are normal.   Lymph: No cervical, supraclavicular or axillary lymph nodes are palpable  Skin: Warm and dry.  No rashes or suspicious skin lesions.  Musculoskeletal: Normal gait. No extremity cyanosis, clubbing, or edema.  Psych: Normal mood and affect. Alert and oriented x3. Judgment and insight is " normal.  Breast:  no nipple discharge, no skin changes.  Positive for painful left upper breast region with asymmetry. Both breasts are free of palpable pathology and the axillas are free of lymphadenopathy.      Medical decision-making and discussion:  1. Presence of bilateral breast implant  2. Capsular contracture of breast implant, initial encounter  3. Painful lumpy left breast  Chronic problem.  Unstable.  Ultrasound of bilateral breast has been ordered.  Patient be contacted with results.  Advised patient continue over-the-counter analgesics as needed.  Advised patient to use proper body mechanics.  Discussed emergency room precautions.    We will discuss ultrasound results in more detail during patient's follow-up appointment on 7/7/2021.    Follow-up for worsening symptoms,lack of expected recovery, or should new symptoms or problems arise.    - US-BREAST BILAT-COMPLETE; Future    4. Pure hypercholesterolemia  Chronic problem.  Uncontrolled.  Patient's 10-year ASCVD score is high risk.  Patient does not want to be placed on a statin medication at this time.  We will discuss in more detail during followed women on 7/7/2021.      Please note that this dictation was created using voice recognition software. I have made every reasonable attempt to correct obvious errors, but I expect that there are errors of grammar and possibly content that I did not discover before finalizing the note.    Assessment/Plan:  1. Presence of bilateral breast implant  US-BREAST BILAT-COMPLETE    CANCELED: US-BREAST BILAT-COMPLETE   2. Capsular contracture of breast implant, initial encounter  US-BREAST BILAT-COMPLETE    CANCELED: US-BREAST BILAT-COMPLETE   3. Painful lumpy left breast  US-BREAST BILAT-COMPLETE   4. Pure hypercholesterolemia         Return if symptoms worsen or fail to improve.

## 2021-06-14 ENCOUNTER — HOSPITAL ENCOUNTER (OUTPATIENT)
Dept: RADIOLOGY | Facility: MEDICAL CENTER | Age: 76
End: 2021-06-14
Payer: MEDICARE

## 2021-06-17 ENCOUNTER — HOSPITAL ENCOUNTER (OUTPATIENT)
Dept: RADIOLOGY | Facility: MEDICAL CENTER | Age: 76
End: 2021-06-17
Attending: PHYSICIAN ASSISTANT
Payer: MEDICARE

## 2021-06-17 DIAGNOSIS — N64.4 PAINFUL LUMPY LEFT BREAST: ICD-10-CM

## 2021-06-17 DIAGNOSIS — Z98.82 PRESENCE OF BILATERAL BREAST IMPLANT: ICD-10-CM

## 2021-06-17 DIAGNOSIS — N63.20 PAINFUL LUMPY LEFT BREAST: ICD-10-CM

## 2021-06-17 DIAGNOSIS — T85.44XA CAPSULAR CONTRACTURE OF BREAST IMPLANT, INITIAL ENCOUNTER: ICD-10-CM

## 2021-06-17 PROCEDURE — 76642 ULTRASOUND BREAST LIMITED: CPT | Mod: RT

## 2021-07-01 ENCOUNTER — HOSPITAL ENCOUNTER (OUTPATIENT)
Dept: LAB | Facility: MEDICAL CENTER | Age: 76
End: 2021-07-01
Attending: INTERNAL MEDICINE
Payer: MEDICARE

## 2021-07-01 ENCOUNTER — HOSPITAL ENCOUNTER (OUTPATIENT)
Dept: LAB | Facility: MEDICAL CENTER | Age: 76
End: 2021-07-01
Attending: PHYSICIAN ASSISTANT
Payer: MEDICARE

## 2021-07-01 DIAGNOSIS — E03.9 ACQUIRED HYPOTHYROIDISM: ICD-10-CM

## 2021-07-01 DIAGNOSIS — R73.03 PREDIABETES: ICD-10-CM

## 2021-07-01 LAB
ALBUMIN SERPL BCP-MCNC: 4.3 G/DL (ref 3.2–4.9)
ALBUMIN/GLOB SERPL: 1.5 G/DL
ALP SERPL-CCNC: 77 U/L (ref 30–99)
ALT SERPL-CCNC: 12 U/L (ref 2–50)
ANION GAP SERPL CALC-SCNC: 11 MMOL/L (ref 7–16)
APPEARANCE UR: CLEAR
AST SERPL-CCNC: 15 U/L (ref 12–45)
BACTERIA #/AREA URNS HPF: NEGATIVE /HPF
BASOPHILS # BLD AUTO: 0.8 % (ref 0–1.8)
BASOPHILS # BLD: 0.04 K/UL (ref 0–0.12)
BILIRUB SERPL-MCNC: 0.4 MG/DL (ref 0.1–1.5)
BILIRUB UR QL STRIP.AUTO: NEGATIVE
BUN SERPL-MCNC: 22 MG/DL (ref 8–22)
CALCIUM SERPL-MCNC: 9.6 MG/DL (ref 8.5–10.5)
CHLORIDE SERPL-SCNC: 105 MMOL/L (ref 96–112)
CO2 SERPL-SCNC: 25 MMOL/L (ref 20–33)
COLOR UR: YELLOW
CREAT SERPL-MCNC: 1.02 MG/DL (ref 0.5–1.4)
CREAT UR-MCNC: 172.78 MG/DL
EOSINOPHIL # BLD AUTO: 0.13 K/UL (ref 0–0.51)
EOSINOPHIL NFR BLD: 2.6 % (ref 0–6.9)
EPI CELLS #/AREA URNS HPF: ABNORMAL /HPF
ERYTHROCYTE [DISTWIDTH] IN BLOOD BY AUTOMATED COUNT: 48 FL (ref 35.9–50)
EST. AVERAGE GLUCOSE BLD GHB EST-MCNC: 134 MG/DL
GLOBULIN SER CALC-MCNC: 2.8 G/DL (ref 1.9–3.5)
GLUCOSE SERPL-MCNC: 96 MG/DL (ref 65–99)
GLUCOSE UR STRIP.AUTO-MCNC: NEGATIVE MG/DL
HBA1C MFR BLD: 6.3 % (ref 4–5.6)
HCT VFR BLD AUTO: 47.3 % (ref 37–47)
HGB BLD-MCNC: 14.7 G/DL (ref 12–16)
HYALINE CASTS #/AREA URNS LPF: ABNORMAL /LPF
IMM GRANULOCYTES # BLD AUTO: 0.02 K/UL (ref 0–0.11)
IMM GRANULOCYTES NFR BLD AUTO: 0.4 % (ref 0–0.9)
KETONES UR STRIP.AUTO-MCNC: NEGATIVE MG/DL
LEUKOCYTE ESTERASE UR QL STRIP.AUTO: ABNORMAL
LYMPHOCYTES # BLD AUTO: 1.59 K/UL (ref 1–4.8)
LYMPHOCYTES NFR BLD: 32.3 % (ref 22–41)
MCH RBC QN AUTO: 29.2 PG (ref 27–33)
MCHC RBC AUTO-ENTMCNC: 31.1 G/DL (ref 33.6–35)
MCV RBC AUTO: 93.8 FL (ref 81.4–97.8)
MICRO URNS: ABNORMAL
MONOCYTES # BLD AUTO: 0.4 K/UL (ref 0–0.85)
MONOCYTES NFR BLD AUTO: 8.1 % (ref 0–13.4)
NEUTROPHILS # BLD AUTO: 2.75 K/UL (ref 2–7.15)
NEUTROPHILS NFR BLD: 55.8 % (ref 44–72)
NITRITE UR QL STRIP.AUTO: NEGATIVE
NRBC # BLD AUTO: 0 K/UL
NRBC BLD-RTO: 0 /100 WBC
PH UR STRIP.AUTO: 6.5 [PH] (ref 5–8)
PHOSPHATE SERPL-MCNC: 2.7 MG/DL (ref 2.5–4.5)
PLATELET # BLD AUTO: 224 K/UL (ref 164–446)
PMV BLD AUTO: 11.2 FL (ref 9–12.9)
POTASSIUM SERPL-SCNC: 4.2 MMOL/L (ref 3.6–5.5)
PROT SERPL-MCNC: 7.1 G/DL (ref 6–8.2)
PROT UR QL STRIP: NEGATIVE MG/DL
PROT UR-MCNC: 10 MG/DL (ref 0–15)
PROT/CREAT UR: 58 MG/G (ref 10–107)
RBC # BLD AUTO: 5.04 M/UL (ref 4.2–5.4)
RBC # URNS HPF: ABNORMAL /HPF
RBC UR QL AUTO: NEGATIVE
SODIUM SERPL-SCNC: 141 MMOL/L (ref 135–145)
SP GR UR STRIP.AUTO: 1.02
TSH SERPL DL<=0.005 MIU/L-ACNC: 1.26 UIU/ML (ref 0.38–5.33)
UROBILINOGEN UR STRIP.AUTO-MCNC: 0.2 MG/DL
WBC # BLD AUTO: 4.9 K/UL (ref 4.8–10.8)
WBC #/AREA URNS HPF: ABNORMAL /HPF

## 2021-07-01 PROCEDURE — 84156 ASSAY OF PROTEIN URINE: CPT

## 2021-07-01 PROCEDURE — 84443 ASSAY THYROID STIM HORMONE: CPT

## 2021-07-01 PROCEDURE — 83036 HEMOGLOBIN GLYCOSYLATED A1C: CPT | Mod: GA

## 2021-07-01 PROCEDURE — 85025 COMPLETE CBC W/AUTO DIFF WBC: CPT

## 2021-07-01 PROCEDURE — 84100 ASSAY OF PHOSPHORUS: CPT

## 2021-07-01 PROCEDURE — 81001 URINALYSIS AUTO W/SCOPE: CPT

## 2021-07-01 PROCEDURE — 80053 COMPREHEN METABOLIC PANEL: CPT

## 2021-07-01 PROCEDURE — 82570 ASSAY OF URINE CREATININE: CPT

## 2021-07-01 PROCEDURE — 36415 COLL VENOUS BLD VENIPUNCTURE: CPT

## 2021-07-07 ENCOUNTER — OFFICE VISIT (OUTPATIENT)
Dept: MEDICAL GROUP | Facility: PHYSICIAN GROUP | Age: 76
End: 2021-07-07
Payer: MEDICARE

## 2021-07-07 ENCOUNTER — TELEPHONE (OUTPATIENT)
Dept: MEDICAL GROUP | Facility: PHYSICIAN GROUP | Age: 76
End: 2021-07-07

## 2021-07-07 VITALS
RESPIRATION RATE: 16 BRPM | DIASTOLIC BLOOD PRESSURE: 70 MMHG | OXYGEN SATURATION: 94 % | TEMPERATURE: 99 F | BODY MASS INDEX: 28.78 KG/M2 | SYSTOLIC BLOOD PRESSURE: 140 MMHG | WEIGHT: 156.4 LBS | HEIGHT: 62 IN | HEART RATE: 84 BPM

## 2021-07-07 DIAGNOSIS — Z86.718 HISTORY OF DVT (DEEP VEIN THROMBOSIS): ICD-10-CM

## 2021-07-07 DIAGNOSIS — Z86.711 HISTORY OF PULMONARY EMBOLISM: ICD-10-CM

## 2021-07-07 DIAGNOSIS — D68.69 SECONDARY HYPERCOAGULABILITY DISORDER (HCC): ICD-10-CM

## 2021-07-07 PROCEDURE — 99213 OFFICE O/P EST LOW 20 MIN: CPT | Performed by: PHYSICIAN ASSISTANT

## 2021-07-07 ASSESSMENT — FIBROSIS 4 INDEX: FIB4 SCORE: 1.51

## 2021-07-07 NOTE — PROGRESS NOTES
Chief Complaint   Patient presents with   • Prior Auth-medication     Xarelto   • Medication Refill     Xarelto       HISTORY OF PRESENT ILLNESS: Sandi Peterson is an established 76 y.o. female here to discuss the evaluation and management of:    Patient is a pleasant 76-year-old female here today to follow-up on medication management.  She tells me that she is prescribed Xarelto for history of bilateral pulmonary emboli and bilateral DVT.  States she applied lateral fixation.  On June 12, 2013 patient was hospitalized for 13 days for pulmonary embolism.  In October 2013 and in April 2014 patient was hospitalized once again for additional blood clots.  Patient was prescribed warfarin prior to Xarelto that warfarin did not work for her so then she was switched to Xarelto.  Patient states she had a difficult time tolerating warfarin.  Per chart review Xarelto has been managed by her previous PCP.  Patient is requesting refill.  She denies side effects or complications from Xarelto.  She tells me that she is feeling well.  Patient's pulmonologist has retired.      Patient Active Problem List    Diagnosis Date Noted   • Secondary hypercoagulability disorder (HCC) 07/07/2021   • Environmental and seasonal allergies 05/13/2021   • Vitamin D deficiency 10/23/2019   • Hiatal hernia 10/23/2019   • Prediabetes 11/07/2018   • History of DVT (deep vein thrombosis) 05/21/2018   • Pure hypercholesterolemia 07/12/2017   • Chronic anticoagulation 07/12/2017   • Peripheral polyneuropathy 07/12/2017   • Primary insomnia 11/16/2015   • Acquired hypothyroidism 07/09/2015   • History of gout 05/13/2015   • CKD (chronic kidney disease) stage 3, GFR 30-59 ml/min ([facility]) 05/13/2015   • Essential hypertension 04/06/2015   • History of pulmonary embolism 04/23/2014       Allergies:Lisinopril, Nsaids, Prednisone, Pseudoephedrine hcl, Mobic, Ace inhibitors, Ciprofloxacin, and Clindamycin    Current Outpatient Medications   Medication Sig  Dispense Refill   • [START ON 2021] rivaroxaban (XARELTO) 20 MG Tab tablet Take 1 tablet by mouth every day. 90 tablet 3   • metoprolol tartrate (LOPRESSOR) 25 MG Tab Take 0.5 Tablets by mouth at bedtime. 90 tablet 1   • levothyroxine (SYNTHROID) 50 MCG Tab Take 1 tablet by mouth every morning. 90 tablet 3   • spironolactone (ALDACTONE) 25 MG Tab Take 0.5 Tablets by mouth 1 time a day as needed. 90 tablet 3   • fluticasone (FLONASE) 50 MCG/ACT nasal spray SHAKE LIQUID AND USE 1 SPRAY IN EACH NOSTRIL EVERY DAY 32 g 4   • PROAIR RESPICLICK 108 (90 Base) MCG/ACT AEROSOL POWDER, BREATH ACTIVATED INHALE TWO PUFFS BY MOUTH FOUR TIMES DAILY AS NEEDED 1 Each 0   • polyethylene glycol/lytes (MIRALAX) Pack Take 17 g by mouth as needed.     • Cyanocobalamin (VITAMIN B-12) 1000 MCG Tab Take 2,000 mcg by mouth every day.     • therapeutic multivitamin-minerals (THERAGRAN-M) Tab Take 1 Tab by mouth every day.     • Cholecalciferol (VITAMIN D) 2000 UNITS Cap Take 1,000 Units by mouth every day.     • acetaminophen (TYLENOL) 325 MG TABS Take 325-650 mg by mouth as needed.     • famotidine (PEPCID) 20 MG TABS Take 10 mg by mouth as needed. For GERD       No current facility-administered medications for this visit.       Social History     Tobacco Use   • Smoking status: Never Smoker   • Smokeless tobacco: Never Used   Substance Use Topics   • Alcohol use: No     Alcohol/week: 0.0 oz   • Drug use: No       Family Status   Relation Name Status   • Mo          Breast Cancer   • Fa          Cancer   • Bro  (Not Specified)        Cacer, Thyroid problems   No family history on file.    ROS:  Review of Systems   Constitutional: Negative for fever, chills, weight loss and malaise/fatigue.   HENT: Negative for ear pain, nosebleeds, congestion, sore throat and neck pain.    Eyes: Negative for blurred vision.   Respiratory: Negative for cough, sputum production, shortness of breath and wheezing.    Cardiovascular: Negative  "for chest pain, palpitations, orthopnea and leg swelling.   Gastrointestinal: Negative for heartburn, nausea, vomiting and abdominal pain.   Genitourinary: Negative for dysuria, urgency and frequency.   Musculoskeletal: Negative for myalgias, back pain and joint pain.   Skin: Negative for rash and itching.   Neurological: Negative for dizziness, tingling, tremors, sensory change, focal weakness and headaches.   Endo/Heme/Allergies: Does not bruise/bleed easily.   Psychiatric/Behavioral: Negative for depression, suicidal ideas and memory loss.  The patient is not nervous/anxious and does not have insomnia.    All other systems reviewed and are negative except as in HPI.    Exam: /70 (BP Location: Left arm, Patient Position: Sitting, BP Cuff Size: Adult)   Pulse 84   Temp 37.2 °C (99 °F) (Temporal)   Resp 16   Ht 1.575 m (5' 2\")   Wt 70.9 kg (156 lb 6.4 oz)   SpO2 94%  Body mass index is 28.61 kg/m².  General: Normal appearing. No distress.  HEENT: Normocephalic. Eyes conjunctiva clear lids without ptosis, ears normal shape and contour.  Neck: Supple without JVD. Thyroid is not enlarged.  Pulmonary: Clear to ausculation.  Normal effort. No rales, ronchi, or wheezing.  Cardiovascular: Regular rate and rhythm without murmur.  Abdomen: Nondistended.   Neurologic: Grossly nonfocal.  Cranial nerves are normal.   Skin: Warm and dry.  No rashes or suspicious skin lesions.  Musculoskeletal: Normal gait. No extremity cyanosis, clubbing, or edema.  Psych: Normal mood and affect. Alert and oriented x3. Judgment and insight is normal.    Medical decision-making and discussion:  1. History of pulmonary embolism  2. History of DVT (deep vein thrombosis)  3. Secondary hypercoagulability disorder (HCC)  Chronic but stable problem.  Per chart review patient was unable to tolerate warfarin and then was switched to Xarelto.  Patient has a positive medical history for recurrent blood clots and will be on anticoagulation " treatment indefinitely.  Refilled Xarelto for patient.  Prior Auth will be completed.  Patient be contacted once prior Auth is completed.  Discussed emergency room precautions with patient.  Continue to monitor.    - rivaroxaban (XARELTO) 20 MG Tab tablet; Take 1 tablet by mouth every day.  Dispense: 90 tablet; Refill: 3      Please note that this dictation was created using voice recognition software. I have made every reasonable attempt to correct obvious errors, but I expect that there are errors of grammar and possibly content that I did not discover before finalizing the note.    Assessment/Plan:  1. History of pulmonary embolism  rivaroxaban (XARELTO) 20 MG Tab tablet   2. History of DVT (deep vein thrombosis)  rivaroxaban (XARELTO) 20 MG Tab tablet   3. Secondary hypercoagulability disorder (HCC)  rivaroxaban (XARELTO) 20 MG Tab tablet       No follow-ups on file.

## 2021-07-07 NOTE — TELEPHONE ENCOUNTER
Phone Number Called: [phone] (home)       Call outcome: Spoke to patient regarding message below.    Message: Pt informed the PA has been approved.  Documentation scanned into media and faxed to [facility] for the Pt.

## 2021-10-11 SDOH — ECONOMIC STABILITY: FOOD INSECURITY: WITHIN THE PAST 12 MONTHS, THE FOOD YOU BOUGHT JUST DIDN'T LAST AND YOU DIDN'T HAVE MONEY TO GET MORE.: NEVER TRUE

## 2021-10-11 SDOH — ECONOMIC STABILITY: HOUSING INSECURITY: IN THE LAST 12 MONTHS, HOW MANY PLACES HAVE YOU LIVED?: 1

## 2021-10-11 SDOH — HEALTH STABILITY: PHYSICAL HEALTH: ON AVERAGE, HOW MANY DAYS PER WEEK DO YOU ENGAGE IN MODERATE TO STRENUOUS EXERCISE (LIKE A BRISK WALK)?: 0 DAYS

## 2021-10-11 SDOH — ECONOMIC STABILITY: FOOD INSECURITY: WITHIN THE PAST 12 MONTHS, YOU WORRIED THAT YOUR FOOD WOULD RUN OUT BEFORE YOU GOT MONEY TO BUY MORE.: NEVER TRUE

## 2021-10-11 SDOH — HEALTH STABILITY: PHYSICAL HEALTH: ON AVERAGE, HOW MANY MINUTES DO YOU ENGAGE IN EXERCISE AT THIS LEVEL?: 0 MIN

## 2021-10-11 SDOH — ECONOMIC STABILITY: INCOME INSECURITY: IN THE LAST 12 MONTHS, WAS THERE A TIME WHEN YOU WERE NOT ABLE TO PAY THE MORTGAGE OR RENT ON TIME?: NO

## 2021-10-11 SDOH — ECONOMIC STABILITY: INCOME INSECURITY: HOW HARD IS IT FOR YOU TO PAY FOR THE VERY BASICS LIKE FOOD, HOUSING, MEDICAL CARE, AND HEATING?: NOT HARD AT ALL

## 2021-10-11 SDOH — HEALTH STABILITY: MENTAL HEALTH
STRESS IS WHEN SOMEONE FEELS TENSE, NERVOUS, ANXIOUS, OR CAN'T SLEEP AT NIGHT BECAUSE THEIR MIND IS TROUBLED. HOW STRESSED ARE YOU?: TO SOME EXTENT

## 2021-10-11 SDOH — ECONOMIC STABILITY: TRANSPORTATION INSECURITY
IN THE PAST 12 MONTHS, HAS LACK OF TRANSPORTATION KEPT YOU FROM MEETINGS, WORK, OR FROM GETTING THINGS NEEDED FOR DAILY LIVING?: NO

## 2021-10-11 ASSESSMENT — SOCIAL DETERMINANTS OF HEALTH (SDOH)
DO YOU BELONG TO ANY CLUBS OR ORGANIZATIONS SUCH AS CHURCH GROUPS UNIONS, FRATERNAL OR ATHLETIC GROUPS, OR SCHOOL GROUPS?: NO
IN A TYPICAL WEEK, HOW MANY TIMES DO YOU TALK ON THE PHONE WITH FAMILY, FRIENDS, OR NEIGHBORS?: MORE THAN THREE TIMES A WEEK
HOW OFTEN DO YOU ATTEND CHURCH OR RELIGIOUS SERVICES?: NEVER
HOW OFTEN DO YOU ATTENT MEETINGS OF THE CLUB OR ORGANIZATION YOU BELONG TO?: NEVER
HOW OFTEN DO YOU ATTEND CHURCH OR RELIGIOUS SERVICES?: NEVER
HOW HARD IS IT FOR YOU TO PAY FOR THE VERY BASICS LIKE FOOD, HOUSING, MEDICAL CARE, AND HEATING?: NOT HARD AT ALL
IN A TYPICAL WEEK, HOW MANY TIMES DO YOU TALK ON THE PHONE WITH FAMILY, FRIENDS, OR NEIGHBORS?: MORE THAN THREE TIMES A WEEK
HOW OFTEN DO YOU HAVE SIX OR MORE DRINKS ON ONE OCCASION: NEVER
HOW OFTEN DO YOU ATTENT MEETINGS OF THE CLUB OR ORGANIZATION YOU BELONG TO?: NEVER
HOW OFTEN DO YOU HAVE A DRINK CONTAINING ALCOHOL: NEVER
WITHIN THE PAST 12 MONTHS, YOU WORRIED THAT YOUR FOOD WOULD RUN OUT BEFORE YOU GOT THE MONEY TO BUY MORE: NEVER TRUE
DO YOU BELONG TO ANY CLUBS OR ORGANIZATIONS SUCH AS CHURCH GROUPS UNIONS, FRATERNAL OR ATHLETIC GROUPS, OR SCHOOL GROUPS?: NO

## 2021-10-11 ASSESSMENT — LIFESTYLE VARIABLES
HOW OFTEN DO YOU HAVE A DRINK CONTAINING ALCOHOL: NEVER
HOW OFTEN DO YOU HAVE SIX OR MORE DRINKS ON ONE OCCASION: NEVER

## 2021-10-15 ENCOUNTER — OFFICE VISIT (OUTPATIENT)
Dept: MEDICAL GROUP | Facility: PHYSICIAN GROUP | Age: 76
End: 2021-10-15
Payer: MEDICARE

## 2021-10-15 VITALS
DIASTOLIC BLOOD PRESSURE: 88 MMHG | OXYGEN SATURATION: 95 % | RESPIRATION RATE: 18 BRPM | TEMPERATURE: 97.8 F | BODY MASS INDEX: 28.52 KG/M2 | HEART RATE: 95 BPM | HEIGHT: 62 IN | SYSTOLIC BLOOD PRESSURE: 130 MMHG | WEIGHT: 155 LBS

## 2021-10-15 DIAGNOSIS — N18.31 STAGE 3A CHRONIC KIDNEY DISEASE: ICD-10-CM

## 2021-10-15 DIAGNOSIS — Z23 NEED FOR VACCINATION: ICD-10-CM

## 2021-10-15 DIAGNOSIS — I10 ESSENTIAL HYPERTENSION: ICD-10-CM

## 2021-10-15 DIAGNOSIS — Z86.711 HISTORY OF PULMONARY EMBOLISM: ICD-10-CM

## 2021-10-15 DIAGNOSIS — F41.9 ANXIETY: ICD-10-CM

## 2021-10-15 PROCEDURE — 90662 IIV NO PRSV INCREASED AG IM: CPT | Performed by: FAMILY MEDICINE

## 2021-10-15 PROCEDURE — 99214 OFFICE O/P EST MOD 30 MIN: CPT | Mod: 25 | Performed by: FAMILY MEDICINE

## 2021-10-15 PROCEDURE — G0008 ADMIN INFLUENZA VIRUS VAC: HCPCS | Performed by: FAMILY MEDICINE

## 2021-10-15 RX ORDER — BUPROPION HYDROCHLORIDE 150 MG/1
150 TABLET, EXTENDED RELEASE ORAL 2 TIMES DAILY
Qty: 180 TABLET | Refills: 0 | Status: SHIPPED
Start: 2021-10-15 | End: 2021-10-18

## 2021-10-15 ASSESSMENT — FIBROSIS 4 INDEX: FIB4 SCORE: 1.51

## 2021-10-15 NOTE — ASSESSMENT & PLAN NOTE
Stable. Monitoring BP at home. Currently taking metoprolol AND aldactone as directed.Denies lightheadedness, vision changes, headache, palpitations or leg swelling.

## 2021-10-15 NOTE — PROGRESS NOTES
Subjective:     Chief Complaint   Patient presents with   • Establish Care       HPI:   Sandi presents today to discuss the following.    History of pulmonary embolism  Chronic issue  She is now on Xarelto for this indefinitely  Following up with pulmonology for this     CKD (chronic kidney disease) stage 3, GFR 30-59 ml/min  Chronic issue  Following up with Dr Mckeon, nephrology     Essential hypertension  Stable. Monitoring BP at home. Currently taking metoprolol AND aldactone as directed.Denies lightheadedness, vision changes, headache, palpitations or leg swelling.      Anxiety  Chronic issue  Has had anxiety for some time and has been on xanax and then switched to clonazepam to help  Gets peripheral neuropathy due to the anxiety \  She takes clonazepam once every 3 weeks   Last refill was 3/21 #30      Past Medical History:   Diagnosis Date   • Allergic rhinitis    • Angina of effort ([facility])    • Anxiety    • Arthritis    • Breath shortness     on exertion   • Cataract 06/28/2019    early stages   • DVT (deep venous thrombosis) ([facility]) 2013   • GERD (gastroesophageal reflux disease)    • Gout    • Hiatal hernia    • Hiatus hernia syndrome    • Hyperlipidemia    • Hypertension    • Hypoglycemia    • Hypothyroidism    • PE (pulmonary embolism) 2013   • Pneumonia 2005   • Renal insufficiency    • Urinary frequency        Current Outpatient Medications Ordered in Epic   Medication Sig Dispense Refill   • rivaroxaban (XARELTO) 20 MG Tab tablet Take 1 tablet by mouth every day. 90 tablet 3   • metoprolol tartrate (LOPRESSOR) 25 MG Tab Take 0.5 Tablets by mouth at bedtime. 90 tablet 1   • levothyroxine (SYNTHROID) 50 MCG Tab Take 1 tablet by mouth every morning. 90 tablet 3   • spironolactone (ALDACTONE) 25 MG Tab Take 0.5 Tablets by mouth 1 time a day as needed. 90 tablet 3   • fluticasone (FLONASE) 50 MCG/ACT nasal spray SHAKE LIQUID AND USE 1 SPRAY IN EACH NOSTRIL EVERY DAY 32 g 4   • PROAIR RESPICLICK 108 (90 Base)  "MCG/ACT AEROSOL POWDER, BREATH ACTIVATED INHALE TWO PUFFS BY MOUTH FOUR TIMES DAILY AS NEEDED 1 Each 0   • polyethylene glycol/lytes (MIRALAX) Pack Take 17 g by mouth as needed.     • Cyanocobalamin (VITAMIN B-12) 1000 MCG Tab Take 2,000 mcg by mouth every day.     • therapeutic multivitamin-minerals (THERAGRAN-M) Tab Take 1 Tab by mouth every day.     • Cholecalciferol (VITAMIN D) 2000 UNITS Cap Take 1,000 Units by mouth every day.     • acetaminophen (TYLENOL) 325 MG TABS Take 325-650 mg by mouth as needed.     • famotidine (PEPCID) 20 MG TABS Take 10 mg by mouth as needed. For GERD     • buPROPion (WELLBUTRIN) 75 MG Tab Take 1 Tablet by mouth 2 times a day. 180 Tablet 0     No current Epic-ordered facility-administered medications on file.       Allergies:  Lisinopril, Nsaids, Prednisone, Pseudoephedrine hcl, Mobic, Ace inhibitors, Ciprofloxacin, and Clindamycin    Health Maintenance: Completed    ROS:  Gen: no fevers/chills, no changes in weight  Eyes: no changes in vision  Pulm: no sob, no cough  CV: no chest pain, no palpitations  GI: no nausea/vomiting, no diarrhea      Objective:     Exam:  /88 (BP Location: Left arm, Patient Position: Sitting, BP Cuff Size: Adult)   Pulse 95   Temp 36.6 °C (97.8 °F) (Temporal)   Resp 18   Ht 1.575 m (5' 2\")   Wt 70.3 kg (155 lb)   LMP  (LMP Unknown)   SpO2 95%   BMI 28.35 kg/m²  Body mass index is 28.35 kg/m².        Constitutional: Alert, no distress, well-groomed.  Skin: Warm, dry, good turgor, no rashes in visible areas.  Eye: Equal, round and reactive, conjunctiva clear, lids normal.  ENMT: Lips without lesions, good dentition, moist mucous membranes.  Neck: Trachea midline, no masses, no thyromegaly.  Respiratory: Unlabored respiratory effort, no cough.  MSK: Normal gait, moves all extremities.  Neuro: Grossly non-focal.   Psych: Alert and oriented x3, normal affect and mood.        Assessment & Plan:     76 y.o. female with the following -     1. Need " for vaccination  - INFLUENZA VACCINE, HIGH DOSE (65+ ONLY)    2. History of pulmonary embolism  Chronic condition.  Now on indefinite anticoagulation with Xarelto.  Denies recurrence at this time.  I recommend she continues to follow-up with pulmonology.    3. Stage 3a chronic kidney disease (HCC)  Chronic, stable condition.  Following up with nephrology.  Avoiding nephrotoxins.    4. Essential hypertension  Chronic, stable condition.  Continue with current medications.    5. Anxiety  Chronic, unchanged condition.  The patient suffers from anxiety and somehow triggers for peripheral neuropathy.  She has been on benzodiazepine therapy in the past with Xanax and now with clonazepam.  I strongly recommend against this regimen.  Fortunately she does not take it very often.  At this time I would like to do a trial of Wellbutrin instead and closely follow-up in 6 weeks.  She is amenable to plan.  - buPROPion SR (WELLBUTRIN-SR) 150 MG TABLET SR 12 HR sustained-release tablet; Take 1 Tablet by mouth 2 times a day.  Dispense: 180 Tablet; Refill: 0      Return in about 6 weeks (around 11/26/2021).    Please note that this dictation was created using voice recognition software. I have made every reasonable attempt to correct obvious errors, but I expect that there are errors of grammar and possibly content that I did not discover before finalizing the note.

## 2021-10-15 NOTE — ASSESSMENT & PLAN NOTE
Chronic issue  Has had anxiety for some time and has been on xanax and then switched to clonazepam to help  Gets peripheral neuropathy due to the anxiety \  She takes clonazepam once every 3 weeks   Last refill was 3/21 #30

## 2021-10-15 NOTE — ASSESSMENT & PLAN NOTE
Chronic issue  She is now on Xarelto for this indefinitely  Following up with pulmonology for this

## 2021-10-16 ENCOUNTER — PATIENT MESSAGE (OUTPATIENT)
Dept: MEDICAL GROUP | Facility: PHYSICIAN GROUP | Age: 76
End: 2021-10-16

## 2021-10-18 RX ORDER — BUPROPION HYDROCHLORIDE 75 MG/1
75 TABLET ORAL 2 TIMES DAILY
Qty: 180 TABLET | Refills: 0 | Status: SHIPPED
Start: 2021-10-18 | End: 2021-11-11

## 2021-11-11 ENCOUNTER — OFFICE VISIT (OUTPATIENT)
Dept: MEDICAL GROUP | Facility: PHYSICIAN GROUP | Age: 76
End: 2021-11-11
Payer: MEDICARE

## 2021-11-11 VITALS
SYSTOLIC BLOOD PRESSURE: 116 MMHG | HEART RATE: 89 BPM | OXYGEN SATURATION: 94 % | HEIGHT: 62 IN | TEMPERATURE: 98.8 F | BODY MASS INDEX: 28.26 KG/M2 | WEIGHT: 153.6 LBS | DIASTOLIC BLOOD PRESSURE: 78 MMHG

## 2021-11-11 DIAGNOSIS — I10 ESSENTIAL HYPERTENSION: ICD-10-CM

## 2021-11-11 DIAGNOSIS — F41.9 ANXIETY: ICD-10-CM

## 2021-11-11 DIAGNOSIS — J30.89 ENVIRONMENTAL AND SEASONAL ALLERGIES: ICD-10-CM

## 2021-11-11 DIAGNOSIS — N18.31 STAGE 3A CHRONIC KIDNEY DISEASE: ICD-10-CM

## 2021-11-11 DIAGNOSIS — E03.9 ACQUIRED HYPOTHYROIDISM: ICD-10-CM

## 2021-11-11 PROCEDURE — 99214 OFFICE O/P EST MOD 30 MIN: CPT | Performed by: FAMILY MEDICINE

## 2021-11-11 RX ORDER — SPIRONOLACTONE 25 MG/1
12.5 TABLET ORAL
Qty: 90 TABLET | Refills: 3 | Status: SHIPPED | OUTPATIENT
Start: 2021-11-11 | End: 2022-10-13 | Stop reason: SDUPTHER

## 2021-11-11 RX ORDER — FLUTICASONE PROPIONATE 50 MCG
SPRAY, SUSPENSION (ML) NASAL
Qty: 32 G | Refills: 4 | Status: SHIPPED | OUTPATIENT
Start: 2021-11-11 | End: 2022-10-13 | Stop reason: SDUPTHER

## 2021-11-11 RX ORDER — LEVOTHYROXINE SODIUM 0.05 MG/1
50 TABLET ORAL EVERY MORNING
Qty: 90 TABLET | Refills: 3 | Status: SHIPPED | OUTPATIENT
Start: 2021-11-11 | End: 2022-10-13 | Stop reason: SDUPTHER

## 2021-11-11 RX ORDER — HYDROXYZINE HYDROCHLORIDE 10 MG/1
10 TABLET, FILM COATED ORAL 3 TIMES DAILY PRN
Qty: 30 TABLET | Refills: 0 | Status: SHIPPED
Start: 2021-11-11 | End: 2022-10-13

## 2021-11-11 ASSESSMENT — FIBROSIS 4 INDEX: FIB4 SCORE: 1.51

## 2021-11-11 NOTE — ASSESSMENT & PLAN NOTE
Chronic issue  We prescribed wellbutrin in attempts to control her anxiety rather than using xanax  Has not tried atarax

## 2021-11-11 NOTE — PROGRESS NOTES
Subjective:     Chief Complaint   Patient presents with   • Medication Refill   • Medication Management     not taking Wellbutrin,       HPI:   Sandi presents today to discuss the following.    Anxiety  Chronic issue  We prescribed wellbutrin in attempts to control her anxiety rather than using xanax  Has not tried atarax    CKD (chronic kidney disease) stage 3, GFR 30-59 ml/min  Chronic issue  Following up with nephrology with Dr Linda Motta.      Past Medical History:   Diagnosis Date   • Allergic rhinitis    • Angina of effort ([facility])    • Anxiety    • Arthritis    • Breath shortness     on exertion   • Cataract 06/28/2019    early stages   • DVT (deep venous thrombosis) ([facility]) 2013   • GERD (gastroesophageal reflux disease)    • Gout    • Hiatal hernia    • Hiatus hernia syndrome    • Hyperlipidemia    • Hypertension    • Hypoglycemia    • Hypothyroidism    • PE (pulmonary embolism) 2013   • Pneumonia 2005   • Renal insufficiency    • Urinary frequency        Current Outpatient Medications Ordered in Epic   Medication Sig Dispense Refill   • hydrOXYzine HCl (ATARAX) 10 MG Tab Take 1 Tablet by mouth 3 times a day as needed for Anxiety. 30 Tablet 0   • metoprolol tartrate (LOPRESSOR) 25 MG Tab Take 0.5 Tablets by mouth at bedtime. 90 Tablet 3   • levothyroxine (SYNTHROID) 50 MCG Tab Take 1 Tablet by mouth every morning. 90 Tablet 3   • spironolactone (ALDACTONE) 25 MG Tab Take 0.5 Tablets by mouth 1 time a day as needed. 90 Tablet 3   • fluticasone (FLONASE) 50 MCG/ACT nasal spray SHAKE LIQUID AND USE 1 SPRAY IN EACH NOSTRIL EVERY DAY 32 g 4   • rivaroxaban (XARELTO) 20 MG Tab tablet Take 1 tablet by mouth every day. 90 tablet 3   • PROAIR RESPICLICK 108 (90 Base) MCG/ACT AEROSOL POWDER, BREATH ACTIVATED INHALE TWO PUFFS BY MOUTH FOUR TIMES DAILY AS NEEDED 1 Each 0   • polyethylene glycol/lytes (MIRALAX) Pack Take 17 g by mouth as needed.     • Cyanocobalamin (VITAMIN B-12) 1000 MCG Tab Take 2,000 mcg by mouth  "every day.     • therapeutic multivitamin-minerals (THERAGRAN-M) Tab Take 1 Tab by mouth every day.     • Cholecalciferol (VITAMIN D) 2000 UNITS Cap Take 1,000 Units by mouth every day.     • acetaminophen (TYLENOL) 325 MG TABS Take 325-650 mg by mouth as needed.     • famotidine (PEPCID) 20 MG TABS Take 10 mg by mouth as needed. For GERD       No current Epic-ordered facility-administered medications on file.       Allergies:  Lisinopril, Nsaids, Prednisone, Pseudoephedrine hcl, Mobic, Aspirin, Ace inhibitors, Ciprofloxacin, and Clindamycin    Health Maintenance: Completed    ROS:  Gen: no fevers/chills, no changes in weight  Eyes: no changes in vision  Pulm: no sob, no cough  CV: no chest pain, no palpitations  GI: no nausea/vomiting, no diarrhea      Objective:     Exam:  /78 (BP Location: Right arm, Patient Position: Sitting, BP Cuff Size: Adult)   Pulse 89   Temp 37.1 °C (98.8 °F) (Temporal)   Ht 1.575 m (5' 2\")   Wt 69.7 kg (153 lb 9.6 oz)   LMP  (LMP Unknown)   SpO2 94%   BMI 28.09 kg/m²  Body mass index is 28.09 kg/m².    Constitutional: Alert, no distress, well-groomed.  Skin: Warm, dry, good turgor, no rashes in visible areas.  Eye: Equal, round and reactive, conjunctiva clear, lids normal.  ENMT: Lips without lesions, good dentition, moist mucous membranes.  Neck: Trachea midline, no masses, no thyromegaly.  Respiratory: Unlabored respiratory effort, no cough.  MSK: Normal gait, moves all extremities.  Neuro: Grossly non-focal.   Psych: Alert and oriented x3, normal affect and mood.        Assessment & Plan:     76 y.o. female with the following -     1. Anxiety  Chronic, stable condition.  The patient decided not to proceed with Wellbutrin.  She is amenable to doing a trial of hydroxyzine as needed.  - hydrOXYzine HCl (ATARAX) 10 MG Tab; Take 1 Tablet by mouth 3 times a day as needed for Anxiety.  Dispense: 30 Tablet; Refill: 0    2. Stage 3a chronic kidney disease (HCC)  Chronic, stable " condition.  Continues to follow-up with nephrology.    3. Essential hypertension  Chronic, stable condition.  Continue with current medications.  - metoprolol tartrate (LOPRESSOR) 25 MG Tab; Take 0.5 Tablets by mouth at bedtime.  Dispense: 90 Tablet; Refill: 3  - spironolactone (ALDACTONE) 25 MG Tab; Take 0.5 Tablets by mouth 1 time a day as needed.  Dispense: 90 Tablet; Refill: 3    4. Acquired hypothyroidism  Chronic, stable condition.  Continue with levothyroxine.  - levothyroxine (SYNTHROID) 50 MCG Tab; Take 1 Tablet by mouth every morning.  Dispense: 90 Tablet; Refill: 3    5. Environmental and seasonal allergies  Chronic, stable condition.  Continue with Flonase.  - fluticasone (FLONASE) 50 MCG/ACT nasal spray; SHAKE LIQUID AND USE 1 SPRAY IN EACH NOSTRIL EVERY DAY  Dispense: 32 g; Refill: 4      Return in about 6 months (around 5/11/2022).    Please note that this dictation was created using voice recognition software. I have made every reasonable attempt to correct obvious errors, but I expect that there are errors of grammar and possibly content that I did not discover before finalizing the note.

## 2022-04-06 ENCOUNTER — PATIENT MESSAGE (OUTPATIENT)
Dept: MEDICAL GROUP | Facility: PHYSICIAN GROUP | Age: 77
End: 2022-04-06
Payer: MEDICARE

## 2022-04-06 DIAGNOSIS — Z86.711 HISTORY OF PULMONARY EMBOLISM: ICD-10-CM

## 2022-04-06 DIAGNOSIS — D68.69 SECONDARY HYPERCOAGULABILITY DISORDER (HCC): ICD-10-CM

## 2022-04-06 DIAGNOSIS — Z86.718 HISTORY OF DVT (DEEP VEIN THROMBOSIS): ICD-10-CM

## 2022-06-22 ENCOUNTER — HOSPITAL ENCOUNTER (OUTPATIENT)
Dept: LAB | Facility: MEDICAL CENTER | Age: 77
End: 2022-06-22
Attending: INTERNAL MEDICINE
Payer: MEDICARE

## 2022-06-22 LAB
ALBUMIN SERPL BCP-MCNC: 4.6 G/DL (ref 3.2–4.9)
ALBUMIN/GLOB SERPL: 1.8 G/DL
ALP SERPL-CCNC: 85 U/L (ref 30–99)
ALT SERPL-CCNC: 12 U/L (ref 2–50)
ANION GAP SERPL CALC-SCNC: 13 MMOL/L (ref 7–16)
AST SERPL-CCNC: 15 U/L (ref 12–45)
BASOPHILS # BLD AUTO: 0.6 % (ref 0–1.8)
BASOPHILS # BLD: 0.04 K/UL (ref 0–0.12)
BILIRUB SERPL-MCNC: 0.4 MG/DL (ref 0.1–1.5)
BUN SERPL-MCNC: 27 MG/DL (ref 8–22)
CALCIUM SERPL-MCNC: 9.5 MG/DL (ref 8.5–10.5)
CHLORIDE SERPL-SCNC: 102 MMOL/L (ref 96–112)
CO2 SERPL-SCNC: 22 MMOL/L (ref 20–33)
CREAT SERPL-MCNC: 1.02 MG/DL (ref 0.5–1.4)
EOSINOPHIL # BLD AUTO: 0.29 K/UL (ref 0–0.51)
EOSINOPHIL NFR BLD: 4.4 % (ref 0–6.9)
ERYTHROCYTE [DISTWIDTH] IN BLOOD BY AUTOMATED COUNT: 47.5 FL (ref 35.9–50)
FASTING STATUS PATIENT QL REPORTED: NORMAL
GFR SERPLBLD CREATININE-BSD FMLA CKD-EPI: 56 ML/MIN/1.73 M 2
GLOBULIN SER CALC-MCNC: 2.5 G/DL (ref 1.9–3.5)
GLUCOSE SERPL-MCNC: 106 MG/DL (ref 65–99)
HCT VFR BLD AUTO: 46.9 % (ref 37–47)
HGB BLD-MCNC: 15.1 G/DL (ref 12–16)
IMM GRANULOCYTES # BLD AUTO: 0.03 K/UL (ref 0–0.11)
IMM GRANULOCYTES NFR BLD AUTO: 0.5 % (ref 0–0.9)
LYMPHOCYTES # BLD AUTO: 1.9 K/UL (ref 1–4.8)
LYMPHOCYTES NFR BLD: 29 % (ref 22–41)
MCH RBC QN AUTO: 29.3 PG (ref 27–33)
MCHC RBC AUTO-ENTMCNC: 32.2 G/DL (ref 33.6–35)
MCV RBC AUTO: 90.9 FL (ref 81.4–97.8)
MONOCYTES # BLD AUTO: 0.41 K/UL (ref 0–0.85)
MONOCYTES NFR BLD AUTO: 6.3 % (ref 0–13.4)
NEUTROPHILS # BLD AUTO: 3.88 K/UL (ref 2–7.15)
NEUTROPHILS NFR BLD: 59.2 % (ref 44–72)
NRBC # BLD AUTO: 0 K/UL
NRBC BLD-RTO: 0 /100 WBC
PHOSPHATE SERPL-MCNC: 3.1 MG/DL (ref 2.5–4.5)
PLATELET # BLD AUTO: 263 K/UL (ref 164–446)
PMV BLD AUTO: 10.4 FL (ref 9–12.9)
POTASSIUM SERPL-SCNC: 4.2 MMOL/L (ref 3.6–5.5)
PROT SERPL-MCNC: 7.1 G/DL (ref 6–8.2)
RBC # BLD AUTO: 5.16 M/UL (ref 4.2–5.4)
SODIUM SERPL-SCNC: 137 MMOL/L (ref 135–145)
WBC # BLD AUTO: 6.6 K/UL (ref 4.8–10.8)

## 2022-06-22 PROCEDURE — 84100 ASSAY OF PHOSPHORUS: CPT

## 2022-06-22 PROCEDURE — 80053 COMPREHEN METABOLIC PANEL: CPT

## 2022-06-22 PROCEDURE — 85025 COMPLETE CBC W/AUTO DIFF WBC: CPT

## 2022-06-22 PROCEDURE — 36415 COLL VENOUS BLD VENIPUNCTURE: CPT

## 2022-07-08 ENCOUNTER — OFFICE VISIT (OUTPATIENT)
Dept: MEDICAL GROUP | Facility: PHYSICIAN GROUP | Age: 77
End: 2022-07-08
Payer: MEDICARE

## 2022-07-08 ENCOUNTER — TELEPHONE (OUTPATIENT)
Dept: MEDICAL GROUP | Facility: PHYSICIAN GROUP | Age: 77
End: 2022-07-08

## 2022-07-08 VITALS
OXYGEN SATURATION: 92 % | SYSTOLIC BLOOD PRESSURE: 140 MMHG | HEIGHT: 62 IN | HEART RATE: 81 BPM | BODY MASS INDEX: 28.16 KG/M2 | TEMPERATURE: 97.7 F | WEIGHT: 153 LBS | DIASTOLIC BLOOD PRESSURE: 78 MMHG

## 2022-07-08 DIAGNOSIS — Z79.01 CHRONIC ANTICOAGULATION: ICD-10-CM

## 2022-07-08 DIAGNOSIS — Z86.718 HISTORY OF DVT (DEEP VEIN THROMBOSIS): ICD-10-CM

## 2022-07-08 DIAGNOSIS — Z23 NEED FOR VACCINATION: ICD-10-CM

## 2022-07-08 DIAGNOSIS — Z86.711 HISTORY OF PULMONARY EMBOLISM: ICD-10-CM

## 2022-07-08 DIAGNOSIS — E03.9 ACQUIRED HYPOTHYROIDISM: ICD-10-CM

## 2022-07-08 PROCEDURE — 90715 TDAP VACCINE 7 YRS/> IM: CPT | Performed by: FAMILY MEDICINE

## 2022-07-08 PROCEDURE — 90471 IMMUNIZATION ADMIN: CPT | Performed by: FAMILY MEDICINE

## 2022-07-08 PROCEDURE — 99214 OFFICE O/P EST MOD 30 MIN: CPT | Mod: 25 | Performed by: FAMILY MEDICINE

## 2022-07-08 ASSESSMENT — PATIENT HEALTH QUESTIONNAIRE - PHQ9: CLINICAL INTERPRETATION OF PHQ2 SCORE: 0

## 2022-07-08 ASSESSMENT — FIBROSIS 4 INDEX: FIB4 SCORE: 1.3

## 2022-07-08 NOTE — TELEPHONE ENCOUNTER
DOCUMENTATION OF PAR STATUS:    1. Name of Medication & Dose: xARELTO     2. Name of Prescription Coverage Company & phone #: [email or web address]    3. Date Prior Auth Submitted: 07/08/022    4. What information was given to obtain insurance decision? [id number], PT INTO, QUANITY/SUPPLY

## 2022-07-08 NOTE — ASSESSMENT & PLAN NOTE
Chronic issue  On xarelto lifelong  Requires preauth and they request this  Follows up with pulmonology

## 2022-07-08 NOTE — PROGRESS NOTES
Subjective:     Chief Complaint   Patient presents with   • Follow-Up       HPI:   Sandi presents today to discuss the following.      History of pulmonary embolism  Chronic issue  On xarelto lifelong  Requires preauth and they request this  Follows up with pulmonology     Acquired hypothyroidism  Chronic issue  On synthroid 50mcgs       Past Medical History:   Diagnosis Date   • Allergic rhinitis    • Angina of effort (HCC)    • Anxiety    • Arthritis    • Breath shortness     on exertion   • Cataract 06/28/2019    early stages   • DVT (deep venous thrombosis) (HCC) 2013   • GERD (gastroesophageal reflux disease)    • Gout    • Hiatal hernia    • Hiatus hernia syndrome    • Hyperlipidemia    • Hypertension    • Hypoglycemia    • Hypothyroidism    • PE (pulmonary embolism) 2013   • Pneumonia 2005   • Renal insufficiency    • Urinary frequency        Current Outpatient Medications Ordered in Epic   Medication Sig Dispense Refill   • rivaroxaban (XARELTO) 20 MG Tab tablet Take 1 Tablet by mouth every day. 90 Tablet 3   • hydrOXYzine HCl (ATARAX) 10 MG Tab Take 1 Tablet by mouth 3 times a day as needed for Anxiety. 30 Tablet 0   • metoprolol tartrate (LOPRESSOR) 25 MG Tab Take 0.5 Tablets by mouth at bedtime. 90 Tablet 3   • levothyroxine (SYNTHROID) 50 MCG Tab Take 1 Tablet by mouth every morning. 90 Tablet 3   • spironolactone (ALDACTONE) 25 MG Tab Take 0.5 Tablets by mouth 1 time a day as needed. 90 Tablet 3   • fluticasone (FLONASE) 50 MCG/ACT nasal spray SHAKE LIQUID AND USE 1 SPRAY IN EACH NOSTRIL EVERY DAY 32 g 4   • PROAIR RESPICLICK 108 (90 Base) MCG/ACT AEROSOL POWDER, BREATH ACTIVATED INHALE TWO PUFFS BY MOUTH FOUR TIMES DAILY AS NEEDED 1 Each 0   • polyethylene glycol/lytes (MIRALAX) Pack Take 17 g by mouth as needed.     • Cyanocobalamin (VITAMIN B-12) 1000 MCG Tab Take 2,000 mcg by mouth every day.     • therapeutic multivitamin-minerals (THERAGRAN-M) Tab Take 1 Tab by mouth every day.     •  "Cholecalciferol (VITAMIN D) 2000 UNITS Cap Take 1,000 Units by mouth every day.     • acetaminophen (TYLENOL) 325 MG TABS Take 325-650 mg by mouth as needed.     • famotidine (PEPCID) 20 MG TABS Take 10 mg by mouth as needed. For GERD       No current Epic-ordered facility-administered medications on file.       Allergies:  Lisinopril, Nsaids, Prednisone, Pseudoephedrine hcl, Mobic, Aspirin, Ace inhibitors, Ciprofloxacin, and Clindamycin    Health Maintenance: Completed    ROS:  Gen: no fevers/chills, no changes in weight  Eyes: no changes in vision  Pulm: no sob, no cough  CV: no chest pain, no palpitations  GI: no nausea/vomiting, no diarrhea      Objective:     Exam:  BP (!) 140/78 (BP Location: Right arm, Patient Position: Sitting, BP Cuff Size: Adult)   Pulse 81   Temp 36.5 °C (97.7 °F) (Temporal)   Ht 1.575 m (5' 2\")   Wt 69.4 kg (153 lb)   LMP  (LMP Unknown)   SpO2 92%   BMI 27.98 kg/m²  Body mass index is 27.98 kg/m².        Constitutional: Alert, no distress, well-groomed.  Skin: Warm, dry, good turgor, no rashes in visible areas.  Eye: Equal, round and reactive, conjunctiva clear, lids normal.  ENMT: Lips without lesions, good dentition, moist mucous membranes.  Neck: Trachea midline, no masses, no thyromegaly.  Respiratory: Unlabored respiratory effort, no cough.  MSK: Normal gait, moves all extremities.  Neuro: Grossly non-focal.   Psych: Alert and oriented x3, normal affect and mood.        Assessment & Plan:     77 y.o. female with the following -     1. History of pulmonary embolism  2. History of DVT (deep vein thrombosis  3. Chronic anticoagulation  Condition.  On lifelong Xarelto.  Continue to follow-up with pulmonology.  Continue with Xarelto.    4. Acquired hypothyroidism  Chronic condition.  Continue with Synthroid.  Due for repeat labs.  - TSH WITH REFLEX TO FT4; Future    5. Need for vaccination  - Tdap Vaccine =>8YO IM      No follow-ups on file.    Please note that this dictation was " created using voice recognition software. I have made every reasonable attempt to correct obvious errors, but I expect that there are errors of grammar and possibly content that I did not discover before finalizing the note.

## 2022-08-18 ENCOUNTER — HOSPITAL ENCOUNTER (OUTPATIENT)
Dept: LAB | Facility: MEDICAL CENTER | Age: 77
End: 2022-08-18
Attending: INTERNAL MEDICINE
Payer: MEDICARE

## 2022-08-18 ENCOUNTER — OFFICE VISIT (OUTPATIENT)
Dept: SLEEP MEDICINE | Facility: MEDICAL CENTER | Age: 77
End: 2022-08-18
Payer: MEDICARE

## 2022-08-18 VITALS
OXYGEN SATURATION: 93 % | HEIGHT: 62 IN | BODY MASS INDEX: 28.34 KG/M2 | SYSTOLIC BLOOD PRESSURE: 124 MMHG | WEIGHT: 154 LBS | DIASTOLIC BLOOD PRESSURE: 82 MMHG | HEART RATE: 91 BPM

## 2022-08-18 DIAGNOSIS — E03.9 ACQUIRED HYPOTHYROIDISM: ICD-10-CM

## 2022-08-18 DIAGNOSIS — Z86.711 HISTORY OF PULMONARY EMBOLISM: ICD-10-CM

## 2022-08-18 DIAGNOSIS — Z79.01 CHRONIC ANTICOAGULATION: ICD-10-CM

## 2022-08-18 DIAGNOSIS — R60.9 PERIPHERAL EDEMA: Chronic | ICD-10-CM

## 2022-08-18 PROBLEM — R60.0 PERIPHERAL EDEMA: Chronic | Status: ACTIVE | Noted: 2022-08-18

## 2022-08-18 LAB
BASOPHILS # BLD AUTO: 0.7 % (ref 0–1.8)
BASOPHILS # BLD: 0.05 K/UL (ref 0–0.12)
EOSINOPHIL # BLD AUTO: 0.21 K/UL (ref 0–0.51)
EOSINOPHIL NFR BLD: 2.8 % (ref 0–6.9)
ERYTHROCYTE [DISTWIDTH] IN BLOOD BY AUTOMATED COUNT: 50.7 FL (ref 35.9–50)
HCT VFR BLD AUTO: 43.8 % (ref 37–47)
HGB BLD-MCNC: 14 G/DL (ref 12–16)
IMM GRANULOCYTES # BLD AUTO: 0.02 K/UL (ref 0–0.11)
IMM GRANULOCYTES NFR BLD AUTO: 0.3 % (ref 0–0.9)
LYMPHOCYTES # BLD AUTO: 2.29 K/UL (ref 1–4.8)
LYMPHOCYTES NFR BLD: 31.1 % (ref 22–41)
MCH RBC QN AUTO: 29.4 PG (ref 27–33)
MCHC RBC AUTO-ENTMCNC: 32 G/DL (ref 33.6–35)
MCV RBC AUTO: 92 FL (ref 81.4–97.8)
MONOCYTES # BLD AUTO: 0.49 K/UL (ref 0–0.85)
MONOCYTES NFR BLD AUTO: 6.6 % (ref 0–13.4)
NEUTROPHILS # BLD AUTO: 4.31 K/UL (ref 2–7.15)
NEUTROPHILS NFR BLD: 58.5 % (ref 44–72)
NRBC # BLD AUTO: 0 K/UL
NRBC BLD-RTO: 0 /100 WBC
NT-PROBNP SERPL IA-MCNC: 376 PG/ML (ref 0–125)
PLATELET # BLD AUTO: 231 K/UL (ref 164–446)
PMV BLD AUTO: 10.7 FL (ref 9–12.9)
RBC # BLD AUTO: 4.76 M/UL (ref 4.2–5.4)
TSH SERPL DL<=0.005 MIU/L-ACNC: 1.52 UIU/ML (ref 0.38–5.33)
WBC # BLD AUTO: 7.4 K/UL (ref 4.8–10.8)

## 2022-08-18 PROCEDURE — 99215 OFFICE O/P EST HI 40 MIN: CPT | Performed by: INTERNAL MEDICINE

## 2022-08-18 PROCEDURE — 36415 COLL VENOUS BLD VENIPUNCTURE: CPT

## 2022-08-18 PROCEDURE — 84443 ASSAY THYROID STIM HORMONE: CPT

## 2022-08-18 PROCEDURE — 83880 ASSAY OF NATRIURETIC PEPTIDE: CPT | Mod: GA

## 2022-08-18 PROCEDURE — 85025 COMPLETE CBC W/AUTO DIFF WBC: CPT

## 2022-08-18 ASSESSMENT — FIBROSIS 4 INDEX: FIB4 SCORE: 1.3

## 2022-08-18 NOTE — PATIENT INSTRUCTIONS
Please schedule your ECHO (ultrasound of the heart)  Please get your bloodwork. It does not require fasting. You can eat or drink beforehand.   Please schedule a 6 minute walk test.   You will need to hold your Xarelto for 3 days before your tooth extraction.   I have sent your albuterol to the pharmacy.   Please see us again in 3 months

## 2022-08-18 NOTE — PROGRESS NOTES
Pulmonary Clinic Note    Chief Complaint:  Chief Complaint   Patient presents with    Follow-Up     Last ov 10/17/2019 for dx blood clots/ scar tissue on lungs        HPI:   Sandi Peterson is a very pleasant 77 y.o. female seen in the pulmonary clinic in August 2022.  This is her first visit with pulmonary.  She has reported history of DVT and pulmonary embolism.  She also has a history of environmental and seasonal allergies.  She has multiple drug allergies/intolerances.  She is on Xarelto and ProAir.  She is a never smoker.    She states she was hospitalized for 13 days in 2013 for massive bilateral PE.  She has been on Xarelto and other forms of anticoagulation ever since.  She notes that lately, he has been having bilateral ankle swelling and has been progressively more short of breath.  Her oxygen saturation today on room air is 93%.  She wondered if wearing the mask may be was causing this.  She also has a tooth infection and was told that she is going to need tooth extraction soon.  She has questions on how to handle the anticoagulation around this operation.  She has been on albuterol for several years and believes that this helps her with her energy levels.      Past Medical History:   Diagnosis Date    Allergic rhinitis     Angina of effort (HCC)     Anxiety     Arthritis     Breath shortness     on exertion    Cataract 06/28/2019    early stages    Chronic anticoagulation 7/12/2017    DVT (deep venous thrombosis) (HCC) 2013    GERD (gastroesophageal reflux disease)     Gout     Hiatal hernia     Hiatus hernia syndrome     Hyperlipidemia     Hypertension     Hypoglycemia     Hypothyroidism     PE (pulmonary embolism) 2013    Peripheral edema 8/18/2022    Noted in August 2022.  Concern for possible right-sided heart failure.    Pneumonia 2005    Renal insufficiency     Shortness of breath     Urinary frequency        Past Surgical History:   Procedure Laterality Date    HYSTERECTOMY, TOTAL ABDOMINAL   1994    BASAL CELL EXCISION      MAMMOPLASTY AUGMENTATION Bilateral 1981/1996    TONSILLECTOMY         Social History     Socioeconomic History    Marital status: Single     Spouse name: Not on file    Number of children: Not on file    Years of education: Not on file    Highest education level: Some college, no degree   Occupational History    Not on file   Tobacco Use    Smoking status: Never    Smokeless tobacco: Never   Substance and Sexual Activity    Alcohol use: No     Alcohol/week: 0.0 oz    Drug use: No    Sexual activity: Not on file   Other Topics Concern    Not on file   Social History Narrative    Not on file     Social Determinants of Health     Financial Resource Strain: Low Risk     Difficulty of Paying Living Expenses: Not hard at all   Food Insecurity: No Food Insecurity    Worried About Running Out of Food in the Last Year: Never true    Ran Out of Food in the Last Year: Never true   Transportation Needs: No Transportation Needs    Lack of Transportation (Medical): No    Lack of Transportation (Non-Medical): No   Physical Activity: Inactive    Days of Exercise per Week: 0 days    Minutes of Exercise per Session: 0 min   Stress: Stress Concern Present    Feeling of Stress : To some extent   Social Connections: Socially Isolated    Frequency of Communication with Friends and Family: More than three times a week    Frequency of Social Gatherings with Friends and Family: Not on file    Attends Synagogue Services: Never    Active Member of Clubs or Organizations: No    Attends Club or Organization Meetings: Never    Marital Status:    Intimate Partner Violence: Not on file   Housing Stability: Low Risk     Unable to Pay for Housing in the Last Year: No    Number of Places Lived in the Last Year: 1    Unstable Housing in the Last Year: No          Family History   Problem Relation Age of Onset    Cancer Brother        Current Outpatient Medications on File Prior to Visit   Medication Sig  "Dispense Refill    rivaroxaban (XARELTO) 20 MG Tab tablet Take 1 Tablet by mouth every day. 90 Tablet 3    hydrOXYzine HCl (ATARAX) 10 MG Tab Take 1 Tablet by mouth 3 times a day as needed for Anxiety. 30 Tablet 0    metoprolol tartrate (LOPRESSOR) 25 MG Tab Take 0.5 Tablets by mouth at bedtime. 90 Tablet 3    levothyroxine (SYNTHROID) 50 MCG Tab Take 1 Tablet by mouth every morning. 90 Tablet 3    spironolactone (ALDACTONE) 25 MG Tab Take 0.5 Tablets by mouth 1 time a day as needed. 90 Tablet 3    fluticasone (FLONASE) 50 MCG/ACT nasal spray SHAKE LIQUID AND USE 1 SPRAY IN EACH NOSTRIL EVERY DAY 32 g 4    PROAIR RESPICLICK 108 (90 Base) MCG/ACT AEROSOL POWDER, BREATH ACTIVATED INHALE TWO PUFFS BY MOUTH FOUR TIMES DAILY AS NEEDED 1 Each 0    polyethylene glycol/lytes (MIRALAX) Pack Take 17 g by mouth as needed.      Cyanocobalamin (VITAMIN B-12) 1000 MCG Tab Take 2,000 mcg by mouth every day.      therapeutic multivitamin-minerals (THERAGRAN-M) Tab Take 1 Tab by mouth every day.      Cholecalciferol (VITAMIN D) 2000 UNITS Cap Take 1,000 Units by mouth every day.      acetaminophen (TYLENOL) 325 MG TABS Take 325-650 mg by mouth as needed.      famotidine (PEPCID) 20 MG TABS Take 10 mg by mouth as needed. For GERD       No current facility-administered medications on file prior to visit.       Allergies: Lisinopril, Nsaids, Prednisone, Pseudoephedrine hcl, Mobic, Aspirin, Ace inhibitors, Ciprofloxacin, and Clindamycin      ROS: A 12 point ROS was performed on intake and during my interview. ROS negative unless specifically noted in HPI.     Vitals:  /82 (BP Location: Left arm, Patient Position: Sitting, BP Cuff Size: Adult)   Pulse 91   Ht 1.575 m (5' 2\")   Wt 69.9 kg (154 lb)   SpO2 93%     Physical Exam:  Physical Exam  Vitals reviewed.   Constitutional:       General: She is not in acute distress.     Appearance: Normal appearance. She is not ill-appearing, toxic-appearing or diaphoretic.   Eyes:      " Conjunctiva/sclera: Conjunctivae normal.      Pupils: Pupils are equal, round, and reactive to light.   Cardiovascular:      Rate and Rhythm: Normal rate and regular rhythm.      Heart sounds: No murmur heard.    No gallop.   Pulmonary:      Effort: Pulmonary effort is normal. No respiratory distress.      Breath sounds: Normal breath sounds. No wheezing or rales.   Musculoskeletal:      Right lower leg: Edema present.      Left lower leg: Edema present.   Skin:     General: Skin is warm and dry.   Neurological:      General: No focal deficit present.      Mental Status: She is alert and oriented to person, place, and time.   Psychiatric:         Mood and Affect: Mood normal.         Behavior: Behavior normal.       Laboratory Data: I personally reviewed labs including historical lab trends.     Assessment/Plan:    Problem List Items Addressed This Visit       Chronic anticoagulation (Chronic)    Peripheral edema (Chronic)    History of pulmonary embolism    Relevant Orders    proBrain Natriuretic Peptide, NT    EC-ECHOCARDIOGRAM COMPLETE W/O CONT    Exercise Test for Bronchospasm / 6-Minute Walk    CBC WITH DIFFERENTIAL    proBrain Natriuretic Peptide, NT     I am concerned that she has peripheral edema and progressive dyspnea in the backdrop of having a history of extensive pulmonary embolism.  It is possible that she is developed a secondary pulmonary hypertension.  Regarding her anticoagulation for her tooth extraction, I told her that she should hold the Xarelto for 3 days preoperatively.  Her PE was very remote and she should be able to hold for 3 days without significant risk.    Plan:  -We will check a BNP and echocardiogram to evaluate the peripheral edema and progressive dyspnea.  She is noted to be satting 93% on room air today.  -Instructed to hold Xarelto 3 days before her tooth extraction  -I will also have our lab perform a 6-minute walk test to evaluate for exertional hypoxia  -We will refill her  albuterol per her request  -Vaccines are up-to-date    Return in about 3 months (around 11/18/2022), or if symptoms worsen or fail to improve.     Total consult time was 55 min. This includes reviewing previous provider notes, personally reviewing previous imaging and spirometry, educating the patient about their disease process, and inhaler education.   __________  Gage Reis, DO  Pulmonary and Critical Care Medicine  [facility]    Please note that this dictation was created using voice recognition software. The accuracy of the dictation is limited to the abilities of the software. I have made every reasonable attempt to correct obvious errors, but I expect that there are errors of grammar and possibly content that I did not discover before finalizing the note.

## 2022-08-19 ENCOUNTER — PATIENT MESSAGE (OUTPATIENT)
Dept: SLEEP MEDICINE | Facility: MEDICAL CENTER | Age: 77
End: 2022-08-19
Payer: MEDICARE

## 2022-08-22 RX ORDER — ALBUTEROL SULFATE 90 UG/1
2 AEROSOL, METERED RESPIRATORY (INHALATION) EVERY 6 HOURS PRN
Qty: 8.5 G | Refills: 3 | Status: SHIPPED | OUTPATIENT
Start: 2022-08-22

## 2022-08-22 RX ORDER — ALBUTEROL SULFATE 90 UG/1
2 AEROSOL, METERED RESPIRATORY (INHALATION) EVERY 6 HOURS PRN
COMMUNITY
End: 2022-08-22 | Stop reason: SDUPTHER

## 2022-08-30 ENCOUNTER — NON-PROVIDER VISIT (OUTPATIENT)
Dept: SLEEP MEDICINE | Facility: MEDICAL CENTER | Age: 77
End: 2022-08-30
Attending: INTERNAL MEDICINE
Payer: MEDICARE

## 2022-08-30 VITALS
SYSTOLIC BLOOD PRESSURE: 138 MMHG | BODY MASS INDEX: 27.79 KG/M2 | HEIGHT: 62 IN | DIASTOLIC BLOOD PRESSURE: 80 MMHG | WEIGHT: 151 LBS

## 2022-08-30 DIAGNOSIS — Z86.711 HISTORY OF PULMONARY EMBOLISM: ICD-10-CM

## 2022-08-30 ASSESSMENT — FIBROSIS 4 INDEX: FIB4 SCORE: 1.48

## 2022-08-30 ASSESSMENT — 6 MINUTE WALK TEST (6MWT)
COMMENTS: TEST ON ROOM AIR.
HEART RATE AT 6 MIN: 106
NUMBER OF RESTS: 0
PERCEIVED BREATHLESSNESS AT 1 MIN: 0
SAO2 AT 2 MIN: 92
SAO2 AT 4 MIN: 92
SAO2 AT 1 MIN: 92
SAO2 AT 3 MIN: 95
BLOOD PRESSURE AT 1 MIN: 142/86
HEART RATE AT 4 MIN: 101
PERCEIVED FATIGUE AT 3 MIN: 0
HEART RATE AT 1 MIN: 75
PERCEIVED BREATHLESSNESS AT 4 MIN: 2
SAO2 AT 5 MIN: 93
PERCEIVED BREATHLESSNESS AT 2 MIN: 30
SAO2 AT 2 MIN: 94
PERCEIVED FATIGUE AT 2 MIN: 0
BLOOD PRESSURE: RIGHT ARM
SAO2 AT 6 MIN: 92
PERCEIVED FATIGUE AT 1 MIN: 0
PERCEIVED BREATHLESSNESS AT 1 MIN: 3
HEART RATE: 76
AMBULATES WITH O2: WITHOUT O2
SITTING BLOOD PRESSURE: 138/80
PERCEIVED FATIGUE AT 4 MIN: 0
HEART RATE AT 2 MIN: 105
HEART RATE AT 3 MIN: 110
SAO2 AT 1 MIN: 93
TOTAL REST TIME: 0
PERCEIVED BREATHLESSNESS AT 5 MIN: 3
PERCEIVED BREATHLESSNESS AT 6 MIN: 3
PERCEIVED BREATHLESSNESS AT 2 MIN: 1
PERCENT OF NORMAL WALKED: 95
O2 SAT PERCENT ROOM AIR: 93
HEART RATE AT 1 MIN: 101
PERCEIVED BREATHLESSNESS AT 3 MIN: 1
HEART RATE AT 2 MIN: 83
PERCEIVED FATIGUE AT 5 MIN: 0
COMMENTS: TEST ON ROOM AIR.
PERCEIVED FATIGUE AT 6 MIN: 0
PERCEIVED FATIGUE AT 1 MIN: 0
HEART RATE AT 5 MIN: 110

## 2022-08-30 NOTE — PROCEDURES
Test on Room Air.    At the beginning of the test, patient's SpO2 was 93% on room air, HR was 75bpm. At the end of the test, patient's SpO2 was 92% on room air, HR was 101bpm. No supplemental O2 needed. Patient walked 1280feet, 95% predicted.        Sandra Rod MD  Pulmonary and Critical Care Medicine  [facility]

## 2022-09-08 ENCOUNTER — TELEPHONE (OUTPATIENT)
Dept: SLEEP MEDICINE | Facility: MEDICAL CENTER | Age: 77
End: 2022-09-08
Payer: MEDICARE

## 2022-09-08 NOTE — TELEPHONE ENCOUNTER
FAX: [facility]  1.Name: Sandi Peterson                 Call Back Number: [phone] (home)       Patient approves a detailed voicemail message: N\A    2.  What is the procedure: Extraction of right maxillary molar    3.  When is the procedure scheduled: TBD    4.  Who is the Surgeon: Lizy Cooley    5. Has the patient completed any screening tests for the procedure:     6. Has PCP received a written request from Surgeon: no    7. Patient scheduled for an appointment for this clearance?:  no    8. Last OV: 08/18/22 with Dr Reis    9. Next OV: 10/10/22 with Dr Reis    10. Last CXR: 06/28/19    11. Last PFT: 09/10/19    12. Last CTA 12/13/13    Current Medications  Current Outpatient Medications   Medication Sig Dispense Refill    albuterol 108 (90 Base) MCG/ACT Aero Soln inhalation aerosol Inhale 2 Puffs every 6 hours as needed for Shortness of Breath. 8.5 g 3    rivaroxaban (XARELTO) 20 MG Tab tablet Take 1 Tablet by mouth every day. 90 Tablet 3    hydrOXYzine HCl (ATARAX) 10 MG Tab Take 1 Tablet by mouth 3 times a day as needed for Anxiety. 30 Tablet 0    metoprolol tartrate (LOPRESSOR) 25 MG Tab Take 0.5 Tablets by mouth at bedtime. 90 Tablet 3    levothyroxine (SYNTHROID) 50 MCG Tab Take 1 Tablet by mouth every morning. 90 Tablet 3    spironolactone (ALDACTONE) 25 MG Tab Take 0.5 Tablets by mouth 1 time a day as needed. 90 Tablet 3    fluticasone (FLONASE) 50 MCG/ACT nasal spray SHAKE LIQUID AND USE 1 SPRAY IN EACH NOSTRIL EVERY DAY 32 g 4    polyethylene glycol/lytes (MIRALAX) Pack Take 17 g by mouth as needed.      Cyanocobalamin (VITAMIN B-12) 1000 MCG Tab Take 2,000 mcg by mouth every day.      therapeutic multivitamin-minerals (THERAGRAN-M) Tab Take 1 Tab by mouth every day.      Cholecalciferol (VITAMIN D) 2000 UNITS Cap Take 1,000 Units by mouth every day.      acetaminophen (TYLENOL) 325 MG TABS Take 325-650 mg by mouth as needed.       No current facility-administered  medications for this visit.       Please advise.

## 2022-09-13 ENCOUNTER — PATIENT MESSAGE (OUTPATIENT)
Dept: SLEEP MEDICINE | Facility: MEDICAL CENTER | Age: 77
End: 2022-09-13
Payer: MEDICARE

## 2022-09-20 ENCOUNTER — HOSPITAL ENCOUNTER (OUTPATIENT)
Dept: CARDIOLOGY | Facility: MEDICAL CENTER | Age: 77
End: 2022-09-20
Attending: INTERNAL MEDICINE
Payer: MEDICARE

## 2022-09-20 DIAGNOSIS — Z86.711 HISTORY OF PULMONARY EMBOLISM: ICD-10-CM

## 2022-09-20 LAB
LV EJECT FRACT  99904: 65
LV EJECT FRACT MOD 2C 99903: 67.3
LV EJECT FRACT MOD 4C 99902: 70.55
LV EJECT FRACT MOD BP 99901: 68.56

## 2022-09-20 PROCEDURE — 93306 TTE W/DOPPLER COMPLETE: CPT

## 2022-09-20 PROCEDURE — 93306 TTE W/DOPPLER COMPLETE: CPT | Mod: 26 | Performed by: INTERNAL MEDICINE

## 2022-09-21 ENCOUNTER — TELEPHONE (OUTPATIENT)
Dept: HOSPITALIST | Facility: MEDICAL CENTER | Age: 77
End: 2022-09-21
Payer: MEDICARE

## 2022-09-22 NOTE — TELEPHONE ENCOUNTER
I called the patient at 19:45 today to discuss her ECHO results and recent blood work. 6MWT was WNL. ECHO was unremarkable. BNP was mild/moderate abn but not out of expected range for her age and GFR. I reviewed her CTA from 2013. She does have a hiatal hernia with RML scarring in addition to some vascular scarring. These are likely the reasons for the low/normal readings. She is not on a PPI.     Regarding her edema, she does perform lymph massage and wears compression stockings.     She has her tooth extraction planned for Monday. She is going to hold her anticoagulation for 72 hours pre-procedure.     Gage Reis D.O.  Consult time: 20 min

## 2022-09-23 NOTE — TELEPHONE ENCOUNTER
Gage Reis D.O.  You 9 days ago     CS  I have filled out and signed the form.           Form was faxed on 09/14/22

## 2022-10-10 ENCOUNTER — APPOINTMENT (OUTPATIENT)
Dept: SLEEP MEDICINE | Facility: MEDICAL CENTER | Age: 77
End: 2022-10-10
Payer: MEDICARE

## 2022-10-10 SDOH — HEALTH STABILITY: PHYSICAL HEALTH: ON AVERAGE, HOW MANY MINUTES DO YOU ENGAGE IN EXERCISE AT THIS LEVEL?: 0 MIN

## 2022-10-10 SDOH — ECONOMIC STABILITY: FOOD INSECURITY: WITHIN THE PAST 12 MONTHS, THE FOOD YOU BOUGHT JUST DIDN'T LAST AND YOU DIDN'T HAVE MONEY TO GET MORE.: NEVER TRUE

## 2022-10-10 SDOH — ECONOMIC STABILITY: FOOD INSECURITY: WITHIN THE PAST 12 MONTHS, YOU WORRIED THAT YOUR FOOD WOULD RUN OUT BEFORE YOU GOT MONEY TO BUY MORE.: NEVER TRUE

## 2022-10-10 SDOH — HEALTH STABILITY: PHYSICAL HEALTH: ON AVERAGE, HOW MANY DAYS PER WEEK DO YOU ENGAGE IN MODERATE TO STRENUOUS EXERCISE (LIKE A BRISK WALK)?: 0 DAYS

## 2022-10-10 SDOH — ECONOMIC STABILITY: HOUSING INSECURITY: IN THE LAST 12 MONTHS, HOW MANY PLACES HAVE YOU LIVED?: 1

## 2022-10-10 SDOH — ECONOMIC STABILITY: INCOME INSECURITY: IN THE LAST 12 MONTHS, WAS THERE A TIME WHEN YOU WERE NOT ABLE TO PAY THE MORTGAGE OR RENT ON TIME?: NO

## 2022-10-10 SDOH — ECONOMIC STABILITY: INCOME INSECURITY: HOW HARD IS IT FOR YOU TO PAY FOR THE VERY BASICS LIKE FOOD, HOUSING, MEDICAL CARE, AND HEATING?: NOT HARD AT ALL

## 2022-10-10 ASSESSMENT — LIFESTYLE VARIABLES
SKIP TO QUESTIONS 9-10: 1
HOW OFTEN DO YOU HAVE SIX OR MORE DRINKS ON ONE OCCASION: NEVER
HOW OFTEN DO YOU HAVE A DRINK CONTAINING ALCOHOL: NEVER
HOW MANY STANDARD DRINKS CONTAINING ALCOHOL DO YOU HAVE ON A TYPICAL DAY: PATIENT DOES NOT DRINK
AUDIT-C TOTAL SCORE: 0

## 2022-10-10 ASSESSMENT — SOCIAL DETERMINANTS OF HEALTH (SDOH)
HOW OFTEN DO YOU ATTENT MEETINGS OF THE CLUB OR ORGANIZATION YOU BELONG TO?: NEVER
HOW OFTEN DO YOU ATTEND CHURCH OR RELIGIOUS SERVICES?: NEVER
HOW OFTEN DO YOU HAVE A DRINK CONTAINING ALCOHOL: NEVER
HOW OFTEN DO YOU GET TOGETHER WITH FRIENDS OR RELATIVES?: ONCE A WEEK
DO YOU BELONG TO ANY CLUBS OR ORGANIZATIONS SUCH AS CHURCH GROUPS UNIONS, FRATERNAL OR ATHLETIC GROUPS, OR SCHOOL GROUPS?: NO
WITHIN THE PAST 12 MONTHS, YOU WORRIED THAT YOUR FOOD WOULD RUN OUT BEFORE YOU GOT THE MONEY TO BUY MORE: NEVER TRUE
HOW HARD IS IT FOR YOU TO PAY FOR THE VERY BASICS LIKE FOOD, HOUSING, MEDICAL CARE, AND HEATING?: NOT HARD AT ALL
IN A TYPICAL WEEK, HOW MANY TIMES DO YOU TALK ON THE PHONE WITH FAMILY, FRIENDS, OR NEIGHBORS?: MORE THAN THREE TIMES A WEEK
IN A TYPICAL WEEK, HOW MANY TIMES DO YOU TALK ON THE PHONE WITH FAMILY, FRIENDS, OR NEIGHBORS?: MORE THAN THREE TIMES A WEEK
HOW OFTEN DO YOU GET TOGETHER WITH FRIENDS OR RELATIVES?: ONCE A WEEK
HOW OFTEN DO YOU HAVE SIX OR MORE DRINKS ON ONE OCCASION: NEVER
HOW OFTEN DO YOU ATTENT MEETINGS OF THE CLUB OR ORGANIZATION YOU BELONG TO?: NEVER
HOW MANY DRINKS CONTAINING ALCOHOL DO YOU HAVE ON A TYPICAL DAY WHEN YOU ARE DRINKING: PATIENT DOES NOT DRINK
HOW OFTEN DO YOU ATTEND CHURCH OR RELIGIOUS SERVICES?: NEVER
DO YOU BELONG TO ANY CLUBS OR ORGANIZATIONS SUCH AS CHURCH GROUPS UNIONS, FRATERNAL OR ATHLETIC GROUPS, OR SCHOOL GROUPS?: NO

## 2022-10-13 ENCOUNTER — OFFICE VISIT (OUTPATIENT)
Dept: MEDICAL GROUP | Facility: PHYSICIAN GROUP | Age: 77
End: 2022-10-13
Payer: MEDICARE

## 2022-10-13 VITALS
HEIGHT: 62 IN | DIASTOLIC BLOOD PRESSURE: 72 MMHG | BODY MASS INDEX: 28.3 KG/M2 | TEMPERATURE: 98.7 F | SYSTOLIC BLOOD PRESSURE: 130 MMHG | HEART RATE: 67 BPM | RESPIRATION RATE: 16 BRPM | OXYGEN SATURATION: 94 % | WEIGHT: 153.8 LBS

## 2022-10-13 DIAGNOSIS — Z79.01 CHRONIC ANTICOAGULATION: ICD-10-CM

## 2022-10-13 DIAGNOSIS — Z86.711 HISTORY OF PULMONARY EMBOLISM: ICD-10-CM

## 2022-10-13 DIAGNOSIS — J30.89 ENVIRONMENTAL AND SEASONAL ALLERGIES: ICD-10-CM

## 2022-10-13 DIAGNOSIS — N18.31 STAGE 3A CHRONIC KIDNEY DISEASE: ICD-10-CM

## 2022-10-13 DIAGNOSIS — E03.9 ACQUIRED HYPOTHYROIDISM: ICD-10-CM

## 2022-10-13 DIAGNOSIS — I10 ESSENTIAL HYPERTENSION: ICD-10-CM

## 2022-10-13 DIAGNOSIS — R60.9 PERIPHERAL EDEMA: Chronic | ICD-10-CM

## 2022-10-13 DIAGNOSIS — Z23 NEED FOR VACCINATION: ICD-10-CM

## 2022-10-13 DIAGNOSIS — Z86.718 HISTORY OF DVT (DEEP VEIN THROMBOSIS): ICD-10-CM

## 2022-10-13 DIAGNOSIS — D68.69 SECONDARY HYPERCOAGULABILITY DISORDER (HCC): ICD-10-CM

## 2022-10-13 PROCEDURE — G0008 ADMIN INFLUENZA VIRUS VAC: HCPCS

## 2022-10-13 PROCEDURE — 90662 IIV NO PRSV INCREASED AG IM: CPT

## 2022-10-13 PROCEDURE — 99214 OFFICE O/P EST MOD 30 MIN: CPT | Mod: 25

## 2022-10-13 RX ORDER — SPIRONOLACTONE 25 MG/1
12.5 TABLET ORAL
Qty: 90 TABLET | Refills: 3 | Status: SHIPPED | OUTPATIENT
Start: 2022-10-13

## 2022-10-13 RX ORDER — FLUTICASONE PROPIONATE 50 MCG
SPRAY, SUSPENSION (ML) NASAL
Qty: 32 G | Refills: 4 | Status: SHIPPED | OUTPATIENT
Start: 2022-10-13

## 2022-10-13 RX ORDER — LEVOTHYROXINE SODIUM 0.05 MG/1
50 TABLET ORAL EVERY MORNING
Qty: 90 TABLET | Refills: 3 | Status: SHIPPED | OUTPATIENT
Start: 2022-10-13

## 2022-10-13 ASSESSMENT — FIBROSIS 4 INDEX: FIB4 SCORE: 1.48

## 2022-10-13 NOTE — TELEPHONE ENCOUNTER
Pt reports via [facility] that this medication did not go over to [facility]    Received request via: Patient    Was the patient seen in the last year in this department? Yes    Does the patient have an active prescription (recently filled or refills available) for medication(s) requested? No

## 2022-10-13 NOTE — ASSESSMENT & PLAN NOTE
Chronic condition, unstable   -Medication adjusted to metoprolol 25 mg daily.  Parameters given to check blood pressure daily and do not take if blood pressure is below 100/50 or heart rate drops below 50 and to notify me immediately  -Recommend home blood pressure monitoring regularly

## 2022-10-13 NOTE — PROGRESS NOTES
Subjective:     CC:  Diagnoses of Need for vaccination, Acquired hypothyroidism, History of pulmonary embolism, Secondary hypercoagulability disorder (HCC), Chronic anticoagulation, Environmental and seasonal allergies, Essential hypertension, History of DVT (deep vein thrombosis), Peripheral edema, and Stage 3a chronic kidney disease ([facility]) were pertinent to this visit.    HISTORY OF THE PRESENT ILLNESS: Patient is a 77 y.o. female. This pleasant patient is here today to establish care and discuss the following problems:    Problem   Peripheral Edema    Patient gets occasional leg edema bilateral lower extremities.  She takes spironolactone if needed for this condition at 12.5 mg/day.  She reports that she uses this medication 2 times per week and it works well for her.  No reported side effects needs refill today. Most recent . Denies SOB, minor lower ext. Swelling at this time.  -Denies orthopnea     Environmental and Seasonal Allergies    Chronic condition stable  - Patient taking Flonase 50 mcg per attenuation nasal spray as needed for exacerbation of allergy symptoms.  Doing well on this medication without reported side effects.     Acquired Hypothyroidism    Chronic condition stable  - Well managed on levothyroxine 50 mcg daily with normal TSH level     Ckd (Chronic Kidney Disease) Stage 3, Gfr 30-59 Ml/Min ([facility])    Chronic condition active last GFR 56  -Followed by Dr. Mckeon nephrologist     Essential Hypertension    Chronic condition active  - Patient recently had dental work done and had elevated blood pressure reading in the 140s over 90s this was concerning to her.  She does have a blood pressure monitor at home.  But has not been using it.  - Pressure in clinic today 130/72 she would like to increase metoprolol to 25 mg daily.  Heart rate is 67.  Given this situation I do feel her blood pressure would certainly benefit from 12.5 more milligrams of metoprolol.  We will increase to 25 mg daily and  "have her do home blood pressure readings.  She will go back to 12.5 mg if heart rate drops below 50 and she is symptomatic with dizziness or lightheadedness.  Or if blood pressure range is too low such as less than 100/50.  She will then notify me of this condition immediately.     History of Pulmonary Embolism    Patient has a history of pulmonary embolism in 2013 for which she has been on Xarelto 20 mg daily for for many years.  She is doing well on this medication without reported side effects.  She is seen by pulmonology for chronic lung scarring and also takes albuterol 2 puffs every 6 hours if needed for shortness of breath.  She does not see pharmacotherapy services as of yet for management of Xarelto.         Health Maintenance: Declines health maintenance topics at this time    ROS:   Review of Systems   All other systems reviewed and are negative.      Objective:     Exam: /72 (BP Location: Left arm, Patient Position: Sitting, BP Cuff Size: Adult)   Pulse 67   Temp 37.1 °C (98.7 °F) (Temporal)   Resp 16   Ht 1.575 m (5' 2\")   Wt 69.8 kg (153 lb 12.8 oz)   SpO2 94%  Body mass index is 28.13 kg/m².    Physical Exam  Constitutional:       General: She is not in acute distress.     Appearance: Normal appearance. She is not ill-appearing or toxic-appearing.   HENT:      Head: Normocephalic.   Eyes:      Conjunctiva/sclera: Conjunctivae normal.   Cardiovascular:      Rate and Rhythm: Normal rate and regular rhythm.      Pulses: Normal pulses.      Heart sounds: Normal heart sounds. No murmur heard.  Pulmonary:      Effort: Pulmonary effort is normal. No respiratory distress.      Breath sounds: Normal breath sounds.   Skin:     General: Skin is warm and dry.   Neurological:      General: No focal deficit present.      Mental Status: She is alert and oriented to person, place, and time.   Psychiatric:         Mood and Affect: Mood normal.         Behavior: Behavior normal.         Labs:   Hospital " Outpatient Visit on 09/20/2022   Component Date Value    Eject.Freddyc. MOD BP 09/20/2022 68.56     Eject.Frac. MOD 4C 09/20/2022 70.55     Eject.Frac. MOD 2C 09/20/2022 67.3     Left Ventrical Ejection * 09/20/2022 65          Assessment & Plan: Medical Decision Making   77 y.o. female with the following -    Problem List Items Addressed This Visit       Chronic anticoagulation (Chronic)    Relevant Orders    Referral to Pharmacotherapy Service    Referral to Anticoagulation Monitoring    Peripheral edema (Chronic)     Chronic condition stable  - Continue spironolactone 12.5 mg daily if needed for peripheral edema         Relevant Medications    spironolactone (ALDACTONE) 25 MG Tab    History of pulmonary embolism     Chronic condition stable  - Continue Xarelto 20 mg daily, refill sent to pharmacy on file  - Referral to pharmacotherapy services for anticoagulation management         Relevant Medications    rivaroxaban (XARELTO) 20 MG Tab tablet    Other Relevant Orders    Referral to Pharmacotherapy Service    Referral to Anticoagulation Monitoring    Essential hypertension     Chronic condition, unstable   -Medication adjusted to metoprolol 25 mg daily.  Parameters given to check blood pressure daily and do not take if blood pressure is below 100/50 or heart rate drops below 50 and to notify me immediately  -Recommend home blood pressure monitoring regularly         Relevant Medications    metoprolol tartrate (LOPRESSOR) 25 MG Tab    rivaroxaban (XARELTO) 20 MG Tab tablet    spironolactone (ALDACTONE) 25 MG Tab    CKD (chronic kidney disease) stage 3, GFR 30-59 ml/min (HCC)     Chronic condition stable  - Recommend continue to follow-up with Dr. Mckeon schedule         Acquired hypothyroidism     Chronic stable condition  - Continue levothyroxine 50 mcg daily  -TSH to be ordered annually         Relevant Medications    levothyroxine (SYNTHROID) 50 MCG Tab    History of DVT (deep vein thrombosis)    Relevant  Medications    rivaroxaban (XARELTO) 20 MG Tab tablet    Environmental and seasonal allergies     Chronic stable condition  - Continue to use Flonase 50 mcg daily         Secondary hypercoagulability disorder (HCC)    Relevant Medications    rivaroxaban (XARELTO) 20 MG Tab tablet    Other Relevant Orders    Referral to Pharmacotherapy Service    Referral to Anticoagulation Monitoring     Other Visit Diagnoses       Need for vaccination        Relevant Orders    Influenza Vaccine, High Dose (65+ Only) (Completed)            Differential diagnosis, natural history, supportive care, and indications for immediate follow-up discussed.  Shared decision making approach was utilized, and patient is amendable with plan of care.  Patient understands to return to clinic or go to the emergency department if symptoms worsen. All questions and concerns addressed.      Return in about 3 months (around 1/13/2023).    Please note that this dictation was created using voice recognition software. I have made every reasonable attempt to correct obvious errors, but I expect that there are errors of grammar and possibly content that I did not discover before finalizing the note.

## 2022-11-11 ENCOUNTER — PATIENT MESSAGE (OUTPATIENT)
Dept: HEALTH INFORMATION MANAGEMENT | Facility: OTHER | Age: 77
End: 2022-11-11

## 2023-03-22 ENCOUNTER — HOSPITAL ENCOUNTER (OUTPATIENT)
Dept: LAB | Facility: MEDICAL CENTER | Age: 78
End: 2023-03-22
Attending: INTERNAL MEDICINE
Payer: MEDICARE

## 2023-03-22 LAB
BASOPHILS # BLD AUTO: 0.8 % (ref 0–1.8)
BASOPHILS # BLD: 0.04 K/UL (ref 0–0.12)
EOSINOPHIL # BLD AUTO: 0.14 K/UL (ref 0–0.51)
EOSINOPHIL NFR BLD: 2.8 % (ref 0–6.9)
ERYTHROCYTE [DISTWIDTH] IN BLOOD BY AUTOMATED COUNT: 49.8 FL (ref 35.9–50)
HCT VFR BLD AUTO: 47.3 % (ref 37–47)
HGB BLD-MCNC: 15 G/DL (ref 12–16)
IMM GRANULOCYTES # BLD AUTO: 0.01 K/UL (ref 0–0.11)
IMM GRANULOCYTES NFR BLD AUTO: 0.2 % (ref 0–0.9)
LYMPHOCYTES # BLD AUTO: 1.82 K/UL (ref 1–4.8)
LYMPHOCYTES NFR BLD: 36.5 % (ref 22–41)
MCH RBC QN AUTO: 29.5 PG (ref 27–33)
MCHC RBC AUTO-ENTMCNC: 31.7 G/DL (ref 33.6–35)
MCV RBC AUTO: 93.1 FL (ref 81.4–97.8)
MONOCYTES # BLD AUTO: 0.35 K/UL (ref 0–0.85)
MONOCYTES NFR BLD AUTO: 7 % (ref 0–13.4)
NEUTROPHILS # BLD AUTO: 2.62 K/UL (ref 2–7.15)
NEUTROPHILS NFR BLD: 52.7 % (ref 44–72)
NRBC # BLD AUTO: 0 K/UL
NRBC BLD-RTO: 0 /100 WBC
PLATELET # BLD AUTO: 223 K/UL (ref 164–446)
PMV BLD AUTO: 10.7 FL (ref 9–12.9)
RBC # BLD AUTO: 5.08 M/UL (ref 4.2–5.4)
WBC # BLD AUTO: 5 K/UL (ref 4.8–10.8)

## 2023-03-22 PROCEDURE — 36415 COLL VENOUS BLD VENIPUNCTURE: CPT

## 2023-03-22 PROCEDURE — 80053 COMPREHEN METABOLIC PANEL: CPT

## 2023-03-22 PROCEDURE — 85025 COMPLETE CBC W/AUTO DIFF WBC: CPT

## 2023-03-23 LAB
ALBUMIN SERPL BCP-MCNC: 4.3 G/DL (ref 3.2–4.9)
ALBUMIN/GLOB SERPL: 1.7 G/DL
ALP SERPL-CCNC: 84 U/L (ref 30–99)
ALT SERPL-CCNC: 17 U/L (ref 2–50)
ANION GAP SERPL CALC-SCNC: 12 MMOL/L (ref 7–16)
AST SERPL-CCNC: 20 U/L (ref 12–45)
BILIRUB SERPL-MCNC: 0.4 MG/DL (ref 0.1–1.5)
BUN SERPL-MCNC: 21 MG/DL (ref 8–22)
CALCIUM ALBUM COR SERPL-MCNC: 9.2 MG/DL (ref 8.5–10.5)
CALCIUM SERPL-MCNC: 9.4 MG/DL (ref 8.5–10.5)
CHLORIDE SERPL-SCNC: 104 MMOL/L (ref 96–112)
CO2 SERPL-SCNC: 23 MMOL/L (ref 20–33)
CREAT SERPL-MCNC: 0.79 MG/DL (ref 0.5–1.4)
GFR SERPLBLD CREATININE-BSD FMLA CKD-EPI: 76 ML/MIN/1.73 M 2
GLOBULIN SER CALC-MCNC: 2.5 G/DL (ref 1.9–3.5)
GLUCOSE SERPL-MCNC: 98 MG/DL (ref 65–99)
POTASSIUM SERPL-SCNC: 4.7 MMOL/L (ref 3.6–5.5)
PROT SERPL-MCNC: 6.8 G/DL (ref 6–8.2)
SODIUM SERPL-SCNC: 139 MMOL/L (ref 135–145)

## 2023-06-28 ENCOUNTER — HOSPITAL ENCOUNTER (OUTPATIENT)
Dept: LAB | Facility: MEDICAL CENTER | Age: 78
End: 2023-06-28
Attending: INTERNAL MEDICINE
Payer: MEDICARE

## 2023-06-28 LAB
ALBUMIN SERPL BCP-MCNC: 4.4 G/DL (ref 3.2–4.9)
APPEARANCE UR: CLEAR
BACTERIA #/AREA URNS HPF: NEGATIVE /HPF
BILIRUB UR QL STRIP.AUTO: NEGATIVE
BUN SERPL-MCNC: 20 MG/DL (ref 8–22)
CALCIUM ALBUM COR SERPL-MCNC: 9.3 MG/DL (ref 8.5–10.5)
CALCIUM SERPL-MCNC: 9.6 MG/DL (ref 8.5–10.5)
CHLORIDE SERPL-SCNC: 105 MMOL/L (ref 96–112)
CO2 SERPL-SCNC: 24 MMOL/L (ref 20–33)
COLOR UR: YELLOW
CREAT SERPL-MCNC: 0.88 MG/DL (ref 0.5–1.4)
EPI CELLS #/AREA URNS HPF: NORMAL /HPF
GFR SERPLBLD CREATININE-BSD FMLA CKD-EPI: 66 ML/MIN/1.73 M 2
GLUCOSE SERPL-MCNC: 93 MG/DL (ref 65–99)
GLUCOSE UR STRIP.AUTO-MCNC: NEGATIVE MG/DL
HYALINE CASTS #/AREA URNS LPF: NORMAL /LPF
KETONES UR STRIP.AUTO-MCNC: NEGATIVE MG/DL
LEUKOCYTE ESTERASE UR QL STRIP.AUTO: ABNORMAL
MICRO URNS: ABNORMAL
NITRITE UR QL STRIP.AUTO: NEGATIVE
PH UR STRIP.AUTO: 6 [PH] (ref 5–8)
PHOSPHATE SERPL-MCNC: 3.5 MG/DL (ref 2.5–4.5)
POTASSIUM SERPL-SCNC: 4.7 MMOL/L (ref 3.6–5.5)
PROT UR QL STRIP: NEGATIVE MG/DL
RBC # URNS HPF: NORMAL /HPF
RBC UR QL AUTO: NEGATIVE
SODIUM SERPL-SCNC: 143 MMOL/L (ref 135–145)
SP GR UR STRIP.AUTO: 1.02
UROBILINOGEN UR STRIP.AUTO-MCNC: 0.2 MG/DL
WBC #/AREA URNS HPF: NORMAL /HPF

## 2023-06-28 PROCEDURE — 81001 URINALYSIS AUTO W/SCOPE: CPT

## 2023-06-28 PROCEDURE — 36415 COLL VENOUS BLD VENIPUNCTURE: CPT

## 2023-06-28 PROCEDURE — 84156 ASSAY OF PROTEIN URINE: CPT

## 2023-06-28 PROCEDURE — 82043 UR ALBUMIN QUANTITATIVE: CPT

## 2023-06-28 PROCEDURE — 80069 RENAL FUNCTION PANEL: CPT

## 2023-06-28 PROCEDURE — 82570 ASSAY OF URINE CREATININE: CPT

## 2023-06-29 LAB
CREAT UR-MCNC: 128.58 MG/DL
CREAT UR-MCNC: 133.44 MG/DL
MICROALBUMIN UR-MCNC: <1.2 MG/DL
MICROALBUMIN/CREAT UR: NORMAL MG/G (ref 0–30)
PROT UR-MCNC: 8 MG/DL (ref 0–15)
PROT/CREAT UR: 62 MG/G (ref 10–107)

## 2023-11-29 ENCOUNTER — PATIENT MESSAGE (OUTPATIENT)
Dept: HEALTH INFORMATION MANAGEMENT | Facility: OTHER | Age: 78
End: 2023-11-29

## 2024-05-08 ENCOUNTER — HOSPITAL ENCOUNTER (OUTPATIENT)
Dept: LAB | Facility: MEDICAL CENTER | Age: 79
End: 2024-05-08
Attending: NURSE PRACTITIONER
Payer: MEDICARE

## 2024-05-08 LAB
ALBUMIN SERPL BCP-MCNC: 4.6 G/DL (ref 3.2–4.9)
ALBUMIN/GLOB SERPL: 2.1 G/DL
ALP SERPL-CCNC: 89 U/L (ref 30–99)
ALT SERPL-CCNC: 13 U/L (ref 2–50)
ANION GAP SERPL CALC-SCNC: 12 MMOL/L (ref 7–16)
APPEARANCE UR: ABNORMAL
AST SERPL-CCNC: 15 U/L (ref 12–45)
BACTERIA #/AREA URNS HPF: NEGATIVE /HPF
BASOPHILS # BLD AUTO: 0.7 % (ref 0–1.8)
BASOPHILS # BLD: 0.04 K/UL (ref 0–0.12)
BILIRUB SERPL-MCNC: 0.5 MG/DL (ref 0.1–1.5)
BILIRUB UR QL STRIP.AUTO: NEGATIVE
BUN SERPL-MCNC: 18 MG/DL (ref 8–22)
CALCIUM ALBUM COR SERPL-MCNC: 8.8 MG/DL (ref 8.5–10.5)
CALCIUM SERPL-MCNC: 9.3 MG/DL (ref 8.5–10.5)
CHLORIDE SERPL-SCNC: 106 MMOL/L (ref 96–112)
CHOLEST SERPL-MCNC: 247 MG/DL (ref 100–199)
CO2 SERPL-SCNC: 24 MMOL/L (ref 20–33)
COLOR UR: YELLOW
CREAT SERPL-MCNC: 0.86 MG/DL (ref 0.5–1.4)
EOSINOPHIL # BLD AUTO: 0.16 K/UL (ref 0–0.51)
EOSINOPHIL NFR BLD: 2.9 % (ref 0–6.9)
EPI CELLS #/AREA URNS HPF: ABNORMAL /HPF
ERYTHROCYTE [DISTWIDTH] IN BLOOD BY AUTOMATED COUNT: 46.5 FL (ref 35.9–50)
FASTING STATUS PATIENT QL REPORTED: NORMAL
GFR SERPLBLD CREATININE-BSD FMLA CKD-EPI: 68 ML/MIN/1.73 M 2
GLOBULIN SER CALC-MCNC: 2.2 G/DL (ref 1.9–3.5)
GLUCOSE SERPL-MCNC: 98 MG/DL (ref 65–99)
GLUCOSE UR STRIP.AUTO-MCNC: NEGATIVE MG/DL
HCT VFR BLD AUTO: 46.1 % (ref 37–47)
HDLC SERPL-MCNC: 45 MG/DL
HGB BLD-MCNC: 14.6 G/DL (ref 12–16)
HYALINE CASTS #/AREA URNS LPF: ABNORMAL /LPF
IMM GRANULOCYTES # BLD AUTO: 0.02 K/UL (ref 0–0.11)
IMM GRANULOCYTES NFR BLD AUTO: 0.4 % (ref 0–0.9)
KETONES UR STRIP.AUTO-MCNC: NEGATIVE MG/DL
LDLC SERPL CALC-MCNC: 156 MG/DL
LEUKOCYTE ESTERASE UR QL STRIP.AUTO: ABNORMAL
LYMPHOCYTES # BLD AUTO: 1.83 K/UL (ref 1–4.8)
LYMPHOCYTES NFR BLD: 33.6 % (ref 22–41)
MCH RBC QN AUTO: 29.3 PG (ref 27–33)
MCHC RBC AUTO-ENTMCNC: 31.7 G/DL (ref 32.2–35.5)
MCV RBC AUTO: 92.6 FL (ref 81.4–97.8)
MICRO URNS: ABNORMAL
MONOCYTES # BLD AUTO: 0.4 K/UL (ref 0–0.85)
MONOCYTES NFR BLD AUTO: 7.3 % (ref 0–13.4)
NEUTROPHILS # BLD AUTO: 3 K/UL (ref 1.82–7.42)
NEUTROPHILS NFR BLD: 55.1 % (ref 44–72)
NITRITE UR QL STRIP.AUTO: NEGATIVE
NRBC # BLD AUTO: 0 K/UL
NRBC BLD-RTO: 0 /100 WBC (ref 0–0.2)
PH UR STRIP.AUTO: 5.5 [PH] (ref 5–8)
PHOSPHATE SERPL-MCNC: 2.9 MG/DL (ref 2.5–4.5)
PLATELET # BLD AUTO: 232 K/UL (ref 164–446)
PMV BLD AUTO: 10.4 FL (ref 9–12.9)
POTASSIUM SERPL-SCNC: 4.4 MMOL/L (ref 3.6–5.5)
PROT SERPL-MCNC: 6.8 G/DL (ref 6–8.2)
PROT UR QL STRIP: NEGATIVE MG/DL
RBC # BLD AUTO: 4.98 M/UL (ref 4.2–5.4)
RBC # URNS HPF: ABNORMAL /HPF
RBC UR QL AUTO: NEGATIVE
SODIUM SERPL-SCNC: 142 MMOL/L (ref 135–145)
SP GR UR STRIP.AUTO: 1.03
T4 FREE SERPL-MCNC: 1.38 NG/DL (ref 0.93–1.7)
TRIGL SERPL-MCNC: 232 MG/DL (ref 0–149)
TSH SERPL DL<=0.005 MIU/L-ACNC: 1.04 UIU/ML (ref 0.38–5.33)
UROBILINOGEN UR STRIP.AUTO-MCNC: 0.2 MG/DL
WBC # BLD AUTO: 5.5 K/UL (ref 4.8–10.8)
WBC #/AREA URNS HPF: ABNORMAL /HPF

## 2024-05-09 LAB
CREAT UR-MCNC: 144.58 MG/DL
CREAT UR-MCNC: 147.92 MG/DL
MICROALBUMIN UR-MCNC: <1.2 MG/DL
MICROALBUMIN/CREAT UR: NORMAL MG/G (ref 0–30)
PROT UR-MCNC: 10 MG/DL (ref 0–15)
PROT/CREAT UR: 69 MG/G (ref 10–107)

## 2024-11-03 ENCOUNTER — OFFICE VISIT (OUTPATIENT)
Dept: URGENT CARE | Facility: CLINIC | Age: 79
End: 2024-11-03
Payer: MEDICARE

## 2024-11-03 ENCOUNTER — APPOINTMENT (OUTPATIENT)
Dept: RADIOLOGY | Facility: IMAGING CENTER | Age: 79
End: 2024-11-03
Attending: FAMILY MEDICINE
Payer: MEDICARE

## 2024-11-03 VITALS
DIASTOLIC BLOOD PRESSURE: 90 MMHG | WEIGHT: 150 LBS | HEIGHT: 60 IN | OXYGEN SATURATION: 95 % | RESPIRATION RATE: 16 BRPM | BODY MASS INDEX: 29.45 KG/M2 | HEART RATE: 95 BPM | TEMPERATURE: 97.5 F | SYSTOLIC BLOOD PRESSURE: 166 MMHG

## 2024-11-03 DIAGNOSIS — S99.921A INJURY OF RIGHT FOOT, INITIAL ENCOUNTER: ICD-10-CM

## 2024-11-03 DIAGNOSIS — S92.514A CLOSED NONDISPLACED FRACTURE OF PROXIMAL PHALANX OF LESSER TOE OF RIGHT FOOT, INITIAL ENCOUNTER: ICD-10-CM

## 2024-11-03 PROCEDURE — 3080F DIAST BP >= 90 MM HG: CPT | Performed by: FAMILY MEDICINE

## 2024-11-03 PROCEDURE — 3077F SYST BP >= 140 MM HG: CPT | Performed by: FAMILY MEDICINE

## 2024-11-03 PROCEDURE — 73630 X-RAY EXAM OF FOOT: CPT | Mod: TC,RT | Performed by: FAMILY MEDICINE

## 2024-11-03 PROCEDURE — 99213 OFFICE O/P EST LOW 20 MIN: CPT | Performed by: FAMILY MEDICINE

## 2024-11-03 RX ORDER — ONDANSETRON 4 MG/1
TABLET, ORALLY DISINTEGRATING ORAL
COMMUNITY

## 2024-11-03 RX ORDER — AMOXICILLIN 500 MG/1
TABLET, FILM COATED ORAL
COMMUNITY

## 2024-11-03 RX ORDER — FUROSEMIDE 20 MG/1
1 TABLET ORAL EVERY MORNING
COMMUNITY

## 2024-11-03 ASSESSMENT — FIBROSIS 4 INDEX: FIB4 SCORE: 1.45

## 2024-11-07 ASSESSMENT — ENCOUNTER SYMPTOMS
FOCAL WEAKNESS: 0
SENSORY CHANGE: 0

## 2024-11-07 NOTE — PROGRESS NOTES
Subjective     Sandi Peterson is a 79 y.o. female who presents with Toe Injury (Hit right foot on side of chair 2 weeks ago, pain on 3rd toe and surround area on right foot x 2 weeks)            2 weeks right third toe pain and bruising after local trauma.  She continues to have some swelling primarily to dorsum of foot.  No redness or warmth.  No red streaking she does have PMH PE and is anticoagulated.  No new calf swelling.  No shortness of breath.  No other aggravating or alleviating factors.        Review of Systems   Musculoskeletal:         No ankle pain   Neurological:  Negative for sensory change and focal weakness.              Objective     BP (!) 166/90 (BP Location: Left arm, Patient Position: Sitting, BP Cuff Size: Adult)   Pulse 95   Temp 36.4 °C (97.5 °F) (Temporal)   Resp 16   Ht 1.524 m (5')   Wt 68 kg (150 lb)   LMP  (LMP Unknown)   SpO2 95%   BMI 29.29 kg/m²      Physical Exam  Constitutional:       General: She is not in acute distress.     Appearance: She is well-developed.   HENT:      Head: Normocephalic and atraumatic.   Eyes:      Conjunctiva/sclera: Conjunctivae normal.   Cardiovascular:      Rate and Rhythm: Normal rate and regular rhythm.      Heart sounds: Normal heart sounds. No murmur heard.  Pulmonary:      Effort: Pulmonary effort is normal.      Breath sounds: Normal breath sounds. No wheezing.   Skin:     General: Skin is warm and dry.      Findings: No rash.   Neurological:      Mental Status: She is alert.                             Assessment & Plan   X-ray right foot: Nondisplaced extra-articular proximal third toe fracture per radiology    1. Injury of right foot, initial encounter  DX-FOOT-COMPLETE 3+ RIGHT      2. Closed nondisplaced fracture of proximal phalanx of lesser toe of right foot, initial encounter          Differential diagnosis, natural history, supportive care, and indications for immediate follow-up were discussed.

## 2025-07-11 ENCOUNTER — OFFICE VISIT (OUTPATIENT)
Dept: SLEEP MEDICINE | Facility: MEDICAL CENTER | Age: 80
End: 2025-07-11
Attending: INTERNAL MEDICINE
Payer: MEDICARE

## 2025-07-11 VITALS
BODY MASS INDEX: 29.84 KG/M2 | DIASTOLIC BLOOD PRESSURE: 72 MMHG | OXYGEN SATURATION: 93 % | HEIGHT: 60 IN | WEIGHT: 152 LBS | HEART RATE: 85 BPM | RESPIRATION RATE: 18 BRPM | SYSTOLIC BLOOD PRESSURE: 146 MMHG

## 2025-07-11 DIAGNOSIS — Z86.711 HISTORY OF PULMONARY EMBOLISM: Primary | ICD-10-CM

## 2025-07-11 PROCEDURE — 99214 OFFICE O/P EST MOD 30 MIN: CPT | Performed by: INTERNAL MEDICINE

## 2025-07-11 PROCEDURE — 3078F DIAST BP <80 MM HG: CPT | Performed by: INTERNAL MEDICINE

## 2025-07-11 PROCEDURE — 3077F SYST BP >= 140 MM HG: CPT | Performed by: INTERNAL MEDICINE

## 2025-07-11 PROCEDURE — 99212 OFFICE O/P EST SF 10 MIN: CPT | Performed by: INTERNAL MEDICINE

## 2025-07-11 ASSESSMENT — FIBROSIS 4 INDEX: FIB4 SCORE: 1.47

## 2025-07-11 NOTE — PROGRESS NOTES
Pulmonary Clinic Note    Chief Complaint:  Chief Complaint   Patient presents with    Follow-Up     Hx of PE. Last seen 08/18/22       HPI:   Since last clinic visit:   0 ED or Urgent Care visits in the past year for respiratory issues.  Has not been hospitalized for respiratory issues.   Recent pulmonary medication changes: none  Recent Antibiotics/prednisone: no  New imaging: nond   New labs: Eo 310 May 2024  New ECHO: Sept 2022; unremarkable   There are no new ROS complaints today. She is here for a routine follow up. She noted that she recently obtained a walker and that her exercise tolerance has improved dramatically. She feels less short of breath.     Pulmonary History:  August 2022: She has reported history of DVT and pulmonary embolism.  She also has a history of environmental and seasonal allergies.  She has multiple drug allergies/intolerances.  She is on Xarelto and ProAir.  She is a never smoker.    She states she was hospitalized for 13 days in 2013 for massive bilateral PE.  She has been on Xarelto and other forms of anticoagulation ever since.  She notes that lately, he has been having bilateral ankle swelling and has been progressively more short of breath.  Her oxygen saturation today on room air is 93%.  She wondered if wearing the mask may be was causing this.  She also has a tooth infection and was told that she is going to need tooth extraction soon.  She has questions on how to handle the anticoagulation around this operation.  She has been on albuterol for several years and believes that this helps her with her energy levels.      Past Medical History:   Diagnosis Date    Allergic rhinitis     Angina of effort (HCC)     Anxiety     Arthritis     Breath shortness     on exertion    Cataract 06/28/2019    early stages    Chronic anticoagulation 7/12/2017    DVT (deep venous thrombosis) (HCC) 2013    GERD (gastroesophageal reflux disease)     Gout     Hiatal hernia     Hiatus hernia syndrome      Hyperlipidemia     Hypertension     Hypoglycemia     Hypothyroidism     PE (pulmonary embolism) 2013    Peripheral edema 8/18/2022    Noted in August 2022.  Concern for possible right-sided heart failure.    Pneumonia 2005    Renal insufficiency     Shortness of breath     Urinary frequency        Past Surgical History:   Procedure Laterality Date    HYSTERECTOMY, TOTAL ABDOMINAL  1994    BASAL CELL EXCISION      MAMMOPLASTY AUGMENTATION Bilateral 1981/1996    TONSILLECTOMY         Social History     Socioeconomic History    Marital status: Single     Spouse name: Not on file    Number of children: Not on file    Years of education: Not on file    Highest education level: Some college, no degree   Occupational History    Not on file   Tobacco Use    Smoking status: Never    Smokeless tobacco: Never   Vaping Use    Vaping status: Never Used   Substance and Sexual Activity    Alcohol use: No     Alcohol/week: 0.0 oz    Drug use: No    Sexual activity: Not on file   Other Topics Concern    Not on file   Social History Narrative    Not on file     Social Drivers of Health     Financial Resource Strain: Low Risk  (10/10/2022)    Overall Financial Resource Strain (CARDIA)     Difficulty of Paying Living Expenses: Not hard at all   Food Insecurity: No Food Insecurity (10/10/2022)    Hunger Vital Sign     Worried About Running Out of Food in the Last Year: Never true     Ran Out of Food in the Last Year: Never true   Transportation Needs: No Transportation Needs (10/10/2022)    PRAPARE - Transportation     Lack of Transportation (Medical): No     Lack of Transportation (Non-Medical): No   Physical Activity: Inactive (10/10/2022)    Exercise Vital Sign     Days of Exercise per Week: 0 days     Minutes of Exercise per Session: 0 min   Stress: Stress Concern Present (10/10/2022)    [employer] - Occupational Stress Questionnaire     Feeling of Stress : To some extent   Social Connections:  Socially Isolated (10/10/2022)    Social Connection and Isolation Panel [NHANES]     Frequency of Communication with Friends and Family: More than three times a week     Frequency of Social Gatherings with Friends and Family: Once a week     Attends Tenriism Services: Never     Active Member of Clubs or Organizations: No     Attends Club or Organization Meetings: Never     Marital Status:    Intimate Partner Violence: Not on file   Housing Stability: Low Risk  (10/10/2022)    Housing Stability Vital Sign     Unable to Pay for Housing in the Last Year: No     Number of Places Lived in the Last Year: 1     Unstable Housing in the Last Year: No          Family History   Problem Relation Age of Onset    Cancer Brother        Current Outpatient Medications on File Prior to Visit   Medication Sig Dispense Refill    furosemide (LASIX) 20 MG Tab Take 1 Tablet by mouth every morning.      ondansetron (ZOFRAN ODT) 4 MG TABLET DISPERSIBLE DISSOLVE 1 TABLET IN MOUTH THREE TIMES DAILY AS NEEDED FOR 5 DAYS FOR NAUSEA AND VOMITING      metoprolol tartrate (LOPRESSOR) 25 MG Tab Take 1 Tablet by mouth at bedtime. 90 Tablet 3    levothyroxine (SYNTHROID) 50 MCG Tab Take 1 Tablet by mouth every morning. 90 Tablet 3    rivaroxaban (XARELTO) 20 MG Tab tablet Take 1 Tablet by mouth every day. 90 Tablet 3    fluticasone (FLONASE) 50 MCG/ACT nasal spray SHAKE LIQUID AND USE 1 SPRAY IN EACH NOSTRIL EVERY DAY 32 g 4    albuterol 108 (90 Base) MCG/ACT Aero Soln inhalation aerosol Inhale 2 Puffs every 6 hours as needed for Shortness of Breath. 8.5 g 3    Cyanocobalamin (VITAMIN B-12) 1000 MCG Tab Take 2,000 mcg by mouth every day.      therapeutic multivitamin-minerals (THERAGRAN-M) Tab Take 1 Tab by mouth every day.      acetaminophen (TYLENOL) 325 MG TABS Take 325-650 mg by mouth as needed.       No current facility-administered medications on file prior to visit.       Allergies: Lisinopril, Nsaids, Prednisone, Pseudoephedrine hcl,  Mobic, Aspirin, Ace inhibitors, Ciprofloxacin, and Clindamycin      ROS: A 12 point ROS was performed on intake and during my interview. ROS negative unless specifically noted in HPI.     Vitals:  BP (!) 146/72 (BP Location: Left arm, Patient Position: Sitting, BP Cuff Size: Adult)   Pulse 85   Resp 18   Ht 1.524 m (5')   Wt 68.9 kg (152 lb)   SpO2 93%     Physical Exam:  Physical Exam  Vitals reviewed.   Constitutional:       General: She is not in acute distress.     Appearance: Normal appearance. She is not ill-appearing, toxic-appearing or diaphoretic.   Eyes:      Conjunctiva/sclera: Conjunctivae normal.      Pupils: Pupils are equal, round, and reactive to light.   Cardiovascular:      Rate and Rhythm: Normal rate and regular rhythm.      Heart sounds: No murmur heard.     No gallop.   Pulmonary:      Effort: Pulmonary effort is normal. No respiratory distress.      Breath sounds: Normal breath sounds. No wheezing or rales.   Musculoskeletal:      Right lower leg: Edema present.      Left lower leg: Edema present.   Skin:     General: Skin is warm and dry.   Neurological:      General: No focal deficit present.      Mental Status: She is alert and oriented to person, place, and time.   Psychiatric:         Mood and Affect: Mood normal.         Behavior: Behavior normal.         Immunization History   Administered Date(s) Administered    INFLUENZA TIV (IM) 11/12/2012    Influenza Seasonal Injectable - Historical Data 10/06/2011    Influenza Vaccine Adult HD 09/27/2016, 10/13/2017, 10/22/2018, 10/23/2019, 10/02/2020, 10/15/2021, 10/13/2022    Influenza Vaccine H1N1 - HISTORICAL DATA 12/21/2009    Influenza Vaccine Pediatric Split - Historical Data 09/17/2010    Influenza Vaccine Quad Inj (Pf) 09/25/2013, 10/02/2014    Influenza split virus trivalent (PF) 10/20/2011, 09/22/2015, 09/27/2016    Radha SARS-CoV-2 Vaccine 03/11/2021    PFIZER BIVALENT SARS-COV-2 VACCINE (12+) 10/03/2022    PFIZER GRAY CAP  SARS-COV-2 VACCINATION (12+) 04/13/2022    PFIZER PURPLE CAP SARS-COV-2 VACCINATION (12+) 11/03/2021    Pneumococcal Conjugate Vaccine (Prevnar/PCV-13) 11/12/2012    Pneumococcal polysaccharide vaccine (PPSV-23) 10/06/2011    Tdap Vaccine 05/14/2012, 07/08/2022         Laboratory Data: I personally reviewed labs including historical lab trends.     Assessment/Plan:  She has mild symptoms with exertion. Likely HFpEF which is well compensated. Possible mild COPD based on mild air trapping in 2019. She is doing well with no complaints. Her exercise tolerance improving with the use of a walker is suggestive of a possible cardiac etiology, although no red flag symptoms of ROS.     Plan:  -continue as needed follow up. She declines updated PFTs or pulmonary rehab at this time  -she has an upcoming appt with her PCP Camila Francois. It may be reasonable to discuss updating her cardiac stress test at that appt since it has been several years and her vague symptoms as well as cardiovascular disease being a leading cause of morbidity and mortality in her age group.          Return in about 1 year (around 7/11/2026), or if symptoms worsen or fail to improve.     Total consult time was 35 min. This includes reviewing previous provider notes, personally reviewing previous imaging and spirometry, educating the patient about their disease process, and inhaler education.   __________  Gage Reis, DO  Pulmonary and Critical Care Medicine  [facility]    Please note that this dictation was created using voice recognition software. The accuracy of the dictation is limited to the abilities of the software. I have made every reasonable attempt to correct obvious errors, but I expect that there are errors of grammar and possibly content that I did not discover before finalizing the note.      not applicable (Male)